# Patient Record
Sex: FEMALE | Race: WHITE | NOT HISPANIC OR LATINO | Employment: FULL TIME | URBAN - METROPOLITAN AREA
[De-identification: names, ages, dates, MRNs, and addresses within clinical notes are randomized per-mention and may not be internally consistent; named-entity substitution may affect disease eponyms.]

---

## 2017-01-10 ENCOUNTER — THERAPY VISIT (OUTPATIENT)
Dept: CHIROPRACTIC MEDICINE | Facility: CLINIC | Age: 57
End: 2017-01-10
Payer: COMMERCIAL

## 2017-01-10 DIAGNOSIS — M54.2 CERVICALGIA: ICD-10-CM

## 2017-01-10 DIAGNOSIS — M99.04 SOMATIC DYSFUNCTION OF SACRAL REGION: ICD-10-CM

## 2017-01-10 DIAGNOSIS — G89.29 CHRONIC BILATERAL LOW BACK PAIN WITHOUT SCIATICA: Primary | ICD-10-CM

## 2017-01-10 DIAGNOSIS — G44.201 INTRACTABLE TENSION-TYPE HEADACHE, UNSPECIFIED CHRONICITY PATTERN: ICD-10-CM

## 2017-01-10 DIAGNOSIS — M99.02 THORACIC SEGMENT DYSFUNCTION: ICD-10-CM

## 2017-01-10 DIAGNOSIS — M54.50 CHRONIC BILATERAL LOW BACK PAIN WITHOUT SCIATICA: Primary | ICD-10-CM

## 2017-01-10 DIAGNOSIS — M53.3 SACRAL PAIN: ICD-10-CM

## 2017-01-10 PROCEDURE — 98941 CHIROPRACT MANJ 3-4 REGIONS: CPT | Mod: AT | Performed by: CHIROPRACTOR

## 2017-01-10 PROCEDURE — 97810 ACUP 1/> WO ESTIM 1ST 15 MIN: CPT | Mod: GA | Performed by: CHIROPRACTOR

## 2017-01-10 NOTE — MR AVS SNAPSHOT
"              After Visit Summary   1/10/2017    Mariah Mart    MRN: [id number]           Patient Information     Date Of Birth          1960        Visit Information        Provider Department      1/10/2017 11:30 AM David Flores DC [facility]        Today's Diagnoses     Chronic bilateral low back pain without sciatica    -  1     Sacral pain         Somatic dysfunction of sacral region         Thoracic segment dysfunction         Cervicalgia         Intractable tension-type headache, unspecified chronicity pattern            Follow-ups after your visit        Your next 10 appointments already scheduled     Jan 17, 2017 10:30 AM   [facility] Chiropractor with PAKO Butler (SEVERO Lundberg  )    [address] [address] [address] [phone]              Who to contact     If you have questions or need follow up information about today's clinic visit or your schedule please contact [facility] directly at [phone].  Normal or non-critical lab and imaging results will be communicated to you by MyChart, letter or phone within 4 business days after the clinic has received the results. If you do not hear from us within 7 days, please contact the clinic through [email or web address] or phone. If you have a critical or abnormal lab result, we will notify you by phone as soon as possible.  Submit refill requests through [employer] or call your pharmacy and they will forward the refill request to us. Please allow 3 business days for your refill to be completed.          Additional Information About Your Visit        SOLOMO365hart Information     [employer] lets you send messages to your doctor, view your test results, renew your prescriptions, schedule appointments and more. To sign up, go to [email or web address] . Click on \"Log in\" on the left side of the screen, which will take you to the Welcome page. Then click on \"Sign up Now\" on the right " side of the page.     You will be asked to enter the access code listed below, as well as some personal information. Please follow the directions to create your username and password.     Your access code is: [email or web address]  Expires: 2017  3:35 PM     Your access code will  in 90 days. If you need help or a new code, please call your [place] clinic or [phone].        Care EveryWhere ID     This is your Care EveryWhere ID. This could be used by other organizations to access your [place] medical records  [id number]        Your Vitals Were     Last Period                   2007            Blood Pressure from Last 3 Encounters:   16 132/78   09/11/15 138/80   07/31/15 158/90    Weight from Last 3 Encounters:   16 77.197 kg (170 lb 3 oz)   09/11/15 74.027 kg (163 lb 3.2 oz)   07/31/15 72.258 kg (159 lb 4.8 oz)              We Performed the Following     ACUPUNCTURE, 1+ NEEDLES, W/O ELECTRICAL STIM; INIT 15 MIN PERSONAL CONTACT     CHIROPRAC MANIP,SPINAL,3-4 REGIONS        Primary Care Provider Office Phone # Fax #    Jennifer Dickerson [phone]       [facility] [address] [place] [address]        Thank you!     Thank you for choosing [facility]  for your care. Our goal is always to provide you with excellent care. Hearing back from our patients is one way we can continue to improve our services. Please take a few minutes to complete the written survey that you may receive in the mail after your visit with us. Thank you!             Your Updated Medication List - Protect others around you: Learn how to safely use, store and throw away your medicines at www.disposemymeds.org.          This list is accurate as of: 1/10/17  4:52 PM.  Always use your most recent med list.                   Brand Name Dispense Instructions for use    ALPRAZolam 0.25 MG tablet    XANAX    30 tablet    Take 1 tablet (0.25 mg) by mouth 3 times daily as needed for  anxiety       amLODIPine 5 MG tablet    NORVASC    90 tablet    Take 1 tablet (5 mg) by mouth daily       aspirin 81 MG tablet     100    1 tab po QD (Once per day)       calcium + D 600-200 MG-UNIT Tabs   Generic drug:  calcium carbonate-vitamin D      1 TABLET TWICE DAILY       DULoxetine 60 MG EC capsule    CYMBALTA    90 capsule    Take 1 capsule (60 mg) by mouth daily       GLUCOSAMINE COMPLEX PO      Take  by mouth.       lidocaine 5 % Patch    LIDODERM    90 patch    Up to 3 patches at once.  Take off after 12 hrs with 12 hr interval without patch before re-applying       lisinopril 20 MG tablet    PRINIVIL/ZESTRIL    90 tablet    Take 1 tablet (20 mg) by mouth daily       MULTIVITAMIN TABS   OR          naproxen 500 MG tablet    NAPROSYN    60 tablet    Take 1 tablet (500 mg) by mouth 2 times daily as needed for moderate pain       orphenadrine 100 MG 12 hr tablet    NORFLEX    180 tablet    Take 1 tablet (100 mg) by mouth 2 times daily       simvastatin 40 MG tablet    ZOCOR    90 tablet    Take 1 tablet (40 mg) by mouth At Bedtime

## 2017-01-10 NOTE — PROGRESS NOTES
Visit #:  1/2017  JUANA: 42%  Cristine: 6  Subscore: 2  NDI: 48%  Authorized to 03-    Subjective:  Mariah Mart is a 54 year old female who is seen in f/u up for chronic: neck, headaches, back pain        Chronic bilateral low back pain without sciatica  Sacral pain  Somatic dysfunction of sacral region  Thoracic segment dysfunction  Cervicalgia  Intractable tension-type headache, unspecified chronicity pattern.     Since last visit the pt reports 35% subjective improvement since initial presentation. She reports much less pain in the neck area and low back. She reports L sided low back pain today in addition to L sided neck pain. The pt denies significant HA in the past week. She feels she is slowly returning to baseline. The pt denies weakness or other unusual sx.     ADL's sitting/driving 4/10 painful.      Objective:  The following was observed:    P: pain elicited on palpation  A: static palpation demonstrates intersegmental asymmetry  R: motion palpation notes restricted motion  T: muscle spasm at level(s):C4, C6, T4, T6, L5 and SacrumLumbar erector spinae, T-spine paraspinal, Lev scapulae, Traps and Sub-occiputBilaterally    Segmental spinal dysfunction/restrictions found at C2, C4, C6, C7, T3, T5, L5 and Sacrum.      Assessment:    Diagnoses:      1. Chronic bilateral low back pain without sciatica    2. Sacral pain    3. Somatic dysfunction of sacral region    4. Thoracic segment dysfunction    5. Cervicalgia    6. Intractable tension-type headache, unspecified chronicity pattern        Patient's condition:  Patient responding well to therapy    Treatment effectiveness:  Pt stable. No increase in sx post treatment      Procedures:  CMT:  13722 Chiropractic manipulative treatment 3-4 regions performed with activator only  Cervical: Cervical manual adjustment in the upper cervicals C2, C3 prone  Thoracic:T5, T6, diversified/activator PA  Lumbar: L4, L5, activator  Pelvic: R sacral, PSIS drop table      Modalities:  24399: Acupuncture:  Points: GV20, TH17B,  GV 20, GB30B, GV 2, GV 3, ST36B NIGHAT POINTS UPPER CERVICAL MUSCULATURE, [place]I POINTS OF THE LUMBAR SPINE, GB30B, 20 MINUTES USING CLEAN NEEDLE TECHNIQUE    Therapeutic procedures:  None      Prognosis: Good    Progress towards Goals:Sleep for 6-8 hours without waking up  Reduce HA from 3/per week to 1/per week  Walk 2 miles without issue  Duration to achieve goal will be 6-8 weeks at a frequency of 1 per week      Recommendations:    Instructions:Mindful of good sitting postures    Follow-up:  Return to care 1 week with ACP

## 2017-01-17 ENCOUNTER — THERAPY VISIT (OUTPATIENT)
Dept: CHIROPRACTIC MEDICINE | Facility: CLINIC | Age: 57
End: 2017-01-17
Payer: COMMERCIAL

## 2017-01-17 DIAGNOSIS — M54.2 CERVICALGIA: ICD-10-CM

## 2017-01-17 DIAGNOSIS — M54.50 CHRONIC BILATERAL LOW BACK PAIN WITHOUT SCIATICA: Primary | ICD-10-CM

## 2017-01-17 DIAGNOSIS — M99.04 SOMATIC DYSFUNCTION OF SACRAL REGION: ICD-10-CM

## 2017-01-17 DIAGNOSIS — M99.02 THORACIC SEGMENT DYSFUNCTION: ICD-10-CM

## 2017-01-17 DIAGNOSIS — G89.29 CHRONIC BILATERAL LOW BACK PAIN WITHOUT SCIATICA: Primary | ICD-10-CM

## 2017-01-17 DIAGNOSIS — M53.3 SACRAL PAIN: ICD-10-CM

## 2017-01-17 DIAGNOSIS — G44.201 INTRACTABLE TENSION-TYPE HEADACHE, UNSPECIFIED CHRONICITY PATTERN: ICD-10-CM

## 2017-01-17 PROCEDURE — 98941 CHIROPRACT MANJ 3-4 REGIONS: CPT | Mod: AT | Performed by: CHIROPRACTOR

## 2017-01-17 PROCEDURE — 97810 ACUP 1/> WO ESTIM 1ST 15 MIN: CPT | Mod: GA | Performed by: CHIROPRACTOR

## 2017-01-17 NOTE — MR AVS SNAPSHOT
"              After Visit Summary   1/17/2017    Mariah Mart    MRN: [id number]           Patient Information     Date Of Birth          1960        Visit Information        Provider Department      1/17/2017 10:30 AM David Flores DC [facility]        Today's Diagnoses     Chronic bilateral low back pain without sciatica    -  1     Sacral pain         Somatic dysfunction of sacral region         Thoracic segment dysfunction         Cervicalgia         Intractable tension-type headache, unspecified chronicity pattern            Follow-ups after your visit        Your next 10 appointments already scheduled     Jan 26, 2017  2:30 PM   [facility] Chiropractor with PAKO Butler ([facility] Lundberg  )    [address] [address] [address] [phone]              Who to contact     If you have questions or need follow up information about today's clinic visit or your schedule please contact [facility] directly at [phone].  Normal or non-critical lab and imaging results will be communicated to you by [email or web address], letter or phone within 4 business days after the clinic has received the results. If you do not hear from us within 7 days, please contact the clinic through [email or web address] or phone. If you have a critical or abnormal lab result, we will notify you by phone as soon as possible.  Submit refill requests through [employer] or call your pharmacy and they will forward the refill request to us. Please allow 3 business days for your refill to be completed.          Additional Information About Your Visit        [email or web address] Information     [employer] lets you send messages to your doctor, view your test results, renew your prescriptions, schedule appointments and more. To sign up, go to [email or web address] . Click on \"Log in\" on the left side of the screen, which will take you to the Welcome page. Then click on \"Sign up Now\" on the right " side of the page.     You will be asked to enter the access code listed below, as well as some personal information. Please follow the directions to create your username and password.     Your access code is: [email or web address]  Expires: 2017  3:35 PM     Your access code will  in 90 days. If you need help or a new code, please call your [place] clinic or [phone].        Care EveryWhere ID     This is your Care EveryWhere ID. This could be used by other organizations to access your [place] medical records  [id number]        Your Vitals Were     Last Period                   2007            Blood Pressure from Last 3 Encounters:   16 132/78   09/11/15 138/80   07/31/15 158/90    Weight from Last 3 Encounters:   16 77.197 kg (170 lb 3 oz)   09/11/15 74.027 kg (163 lb 3.2 oz)   07/31/15 72.258 kg (159 lb 4.8 oz)              We Performed the Following     ACUPUNCTURE, 1+ NEEDLES, W/O ELECTRICAL STIM; INIT 15 MIN PERSONAL CONTACT     CHIROPRAC MANIP,SPINAL,3-4 REGIONS        Primary Care Provider Office Phone # Fax #    Jennifer Dickerson [phone]       [facility] [address] [place] [address]        Thank you!     Thank you for choosing [facility]  for your care. Our goal is always to provide you with excellent care. Hearing back from our patients is one way we can continue to improve our services. Please take a few minutes to complete the written survey that you may receive in the mail after your visit with us. Thank you!             Your Updated Medication List - Protect others around you: Learn how to safely use, store and throw away your medicines at www.disposemymeds.org.          This list is accurate as of: 17 11:59 PM.  Always use your most recent med list.                   Brand Name Dispense Instructions for use    ALPRAZolam 0.25 MG tablet    XANAX    30 tablet    Take 1 tablet (0.25 mg) by mouth 3 times daily as needed for  anxiety       amLODIPine 5 MG tablet    NORVASC    90 tablet    Take 1 tablet (5 mg) by mouth daily       aspirin 81 MG tablet     100    1 tab po QD (Once per day)       calcium + D 600-200 MG-UNIT Tabs   Generic drug:  calcium carbonate-vitamin D      1 TABLET TWICE DAILY       DULoxetine 60 MG EC capsule    CYMBALTA    90 capsule    Take 1 capsule (60 mg) by mouth daily       GLUCOSAMINE COMPLEX PO      Take  by mouth.       lidocaine 5 % Patch    LIDODERM    90 patch    Up to 3 patches at once.  Take off after 12 hrs with 12 hr interval without patch before re-applying       lisinopril 20 MG tablet    PRINIVIL/ZESTRIL    90 tablet    Take 1 tablet (20 mg) by mouth daily       MULTIVITAMIN TABS   OR          naproxen 500 MG tablet    NAPROSYN    60 tablet    Take 1 tablet (500 mg) by mouth 2 times daily as needed for moderate pain       orphenadrine 100 MG 12 hr tablet    NORFLEX    180 tablet    Take 1 tablet (100 mg) by mouth 2 times daily       simvastatin 40 MG tablet    ZOCOR    90 tablet    Take 1 tablet (40 mg) by mouth At Bedtime

## 2017-01-18 NOTE — PROGRESS NOTES
Visit #:  2/2017  JUANA: 42%  Cristine: 6  Subscore: 2  NDI: 48%  Authorized to 03-    Subjective:  Mariah Mart is a 54 year old female who is seen in f/u up for chronic: neck, headaches, back pain        Chronic bilateral low back pain without sciatica  Sacral pain  Somatic dysfunction of sacral region  Thoracic segment dysfunction  Cervicalgia  Intractable tension-type headache, unspecified chronicity pattern.     Since last visit the pt reports 40% subjective improvement since initial presentation. Pain levels are approximately  3-5/10 on VAS. She reports ability to sleep for longer periods without interruption. The pt is walking 2 miles without issue. She reports HA however they are lower grade , not as frequent and resolves with medication. Today she reports pain on the L side of the low back area. The pt denies weakness or other unusual sx.     ADL's sitting/driving 3-4/10 painful.      Objective:  The following was observed:    P: pain elicited on palpation  A: static palpation demonstrates intersegmental asymmetry  R: motion palpation notes restricted motion  T: muscle spasm at level(s):C4, C6, T4, T6, L5 and SacrumLumbar erector spinae, T-spine paraspinal, Lev scapulae, Traps and Sub-occiput Bilaterally    Segmental spinal dysfunction/restrictions found at C2, C4, C6, C7, T3, T5, L5 and Sacrum.      Assessment:    Diagnoses:      1. Chronic bilateral low back pain without sciatica    2. Sacral pain    3. Somatic dysfunction of sacral region    4. Thoracic segment dysfunction    5. Cervicalgia    6. Intractable tension-type headache, unspecified chronicity pattern        Patient's condition:  Patient responding well to therapy    Treatment effectiveness:  Pt stable. No increase in sx post treatment      Procedures:  CMT:  91844 Chiropractic manipulative treatment 3-4 regions performed with activator only  Cervical: Cervical manual adjustment in the upper cervicals C2, C3 prone  Thoracic:T5, T6,  diversified/activator PA  Lumbar: L4, L5, activator  Pelvic: R sacral, PSIS drop table     Modalities:  09553: Acupuncture:  Points: GV20, TH17B,  GV 20, GB30B, GV 2, GV 3, ST36B NIGHAT POINTS UPPER CERVICAL MUSCULATURE, HU[place]I POINTS OF THE LUMBAR SPINE, GB30B, 20 MINUTES USING CLEAN NEEDLE TECHNIQUE    Therapeutic procedures:  None      Prognosis: Good    Progress towards Goals:Sleep for 6-8 hours without waking up  Reduce HA from 3/per week to 1/per week-the pt has met this goal  Walk 2 miles without issue-the pt has met this goal    Duration to achieve goal will be 6-8 weeks at a frequency of 1 per week      Recommendations:    Instructions:Mindful of good sitting postures    Follow-up:  Return to care 1 week with ACP

## 2017-01-26 ENCOUNTER — THERAPY VISIT (OUTPATIENT)
Dept: CHIROPRACTIC MEDICINE | Facility: CLINIC | Age: 57
End: 2017-01-26
Payer: COMMERCIAL

## 2017-01-26 DIAGNOSIS — G44.201 INTRACTABLE TENSION-TYPE HEADACHE, UNSPECIFIED CHRONICITY PATTERN: ICD-10-CM

## 2017-01-26 DIAGNOSIS — M99.04 SOMATIC DYSFUNCTION OF SACRAL REGION: ICD-10-CM

## 2017-01-26 DIAGNOSIS — G89.29 CHRONIC BILATERAL LOW BACK PAIN WITHOUT SCIATICA: Primary | ICD-10-CM

## 2017-01-26 DIAGNOSIS — M99.02 THORACIC SEGMENT DYSFUNCTION: ICD-10-CM

## 2017-01-26 DIAGNOSIS — M53.3 SACRAL PAIN: ICD-10-CM

## 2017-01-26 DIAGNOSIS — M54.2 CERVICALGIA: ICD-10-CM

## 2017-01-26 DIAGNOSIS — M54.50 CHRONIC BILATERAL LOW BACK PAIN WITHOUT SCIATICA: Primary | ICD-10-CM

## 2017-01-26 PROCEDURE — 98941 CHIROPRACT MANJ 3-4 REGIONS: CPT | Mod: AT | Performed by: CHIROPRACTOR

## 2017-01-26 PROCEDURE — 97810 ACUP 1/> WO ESTIM 1ST 15 MIN: CPT | Mod: GA | Performed by: CHIROPRACTOR

## 2017-01-26 NOTE — MR AVS SNAPSHOT
"              After Visit Summary   1/26/2017    Mariah Mart    MRN: [id number]           Patient Information     Date Of Birth          1960        Visit Information        Provider Department      1/26/2017 2:30 PM David Flores DC [facility]        Today's Diagnoses     Chronic bilateral low back pain without sciatica    -  1     Sacral pain         Somatic dysfunction of sacral region         Thoracic segment dysfunction         Cervicalgia         Intractable tension-type headache, unspecified chronicity pattern            Follow-ups after your visit        Your next 10 appointments already scheduled     Feb 09, 2017  2:00 PM   [facility] Chiropractor with PAKO Butler (SE[facility]O Lundberg  )    [address] [address] [address] [phone]              Who to contact     If you have questions or need follow up information about today's clinic visit or your schedule please contact [facility] directly at [phone].  Normal or non-critical lab and imaging results will be communicated to you by Envervhart, letter or phone within 4 business days after the clinic has received the results. If you do not hear from us within 7 days, please contact the clinic through Enver[employer] or phone. If you have a critical or abnormal lab result, we will notify you by phone as soon as possible.  Submit refill requests through [employer] or call your pharmacy and they will forward the refill request to us. Please allow 3 business days for your refill to be completed.          Additional Information About Your Visit        En[employer] Information     [employer] lets you send messages to your doctor, view your test results, renew your prescriptions, schedule appointments and more. To sign up, go to [email or web address] . Click on \"Log in\" on the left side of the screen, which will take you to the Welcome page. Then click on \"Sign up Now\" on the right " side of the page.     You will be asked to enter the access code listed below, as well as some personal information. Please follow the directions to create your username and password.     Your access code is: [email or web address]  Expires: 2017  3:35 PM     Your access code will  in 90 days. If you need help or a new code, please call your [place] clinic or [phone].        Care EveryWhere ID     This is your Care EveryWhere ID. This could be used by other organizations to access your [place] medical records  [id number]        Your Vitals Were     Last Period                   2007            Blood Pressure from Last 3 Encounters:   16 132/78   09/11/15 138/80   07/31/15 158/90    Weight from Last 3 Encounters:   16 77.197 kg (170 lb 3 oz)   09/11/15 74.027 kg (163 lb 3.2 oz)   07/31/15 72.258 kg (159 lb 4.8 oz)              We Performed the Following     ACUPUNCTURE, 1+ NEEDLES, W/O ELECTRICAL STIM; INIT 15 MIN PERSONAL CONTACT     CHIROPRAC MANIP,SPINAL,3-4 REGIONS        Primary Care Provider Office Phone # Fax #    Jennifer Dickerson [phone]       [address] [place] [address]        Thank you!     Thank you for choosing [facility]  for your care. Our goal is always to provide you with excellent care. Hearing back from our patients is one way we can continue to improve our services. Please take a few minutes to complete the written survey that you may receive in the mail after your visit with us. Thank you!             Your Updated Medication List - Protect others around you: Learn how to safely use, store and throw away your medicines at www.disposemymeds.org.          This list is accurate as of: 17 11:59 PM.  Always use your most recent med list.                   Brand Name Dispense Instructions for use    ALPRAZolam 0.25 MG tablet    XANAX    30 tablet    Take 1 tablet (0.25 mg) by mouth 3 times daily as  needed for anxiety       amLODIPine 5 MG tablet    NORVASC    90 tablet    Take 1 tablet (5 mg) by mouth daily       aspirin 81 MG tablet     100    1 tab po QD (Once per day)       calcium + D 600-200 MG-UNIT Tabs   Generic drug:  calcium carbonate-vitamin D      1 TABLET TWICE DAILY       DULoxetine 60 MG EC capsule    CYMBALTA    90 capsule    Take 1 capsule (60 mg) by mouth daily       GLUCOSAMINE COMPLEX PO      Take  by mouth.       lidocaine 5 % Patch    LIDODERM    90 patch    Up to 3 patches at once.  Take off after 12 hrs with 12 hr interval without patch before re-applying       lisinopril 20 MG tablet    PRINIVIL/ZESTRIL    90 tablet    Take 1 tablet (20 mg) by mouth daily       MULTIVITAMIN TABS   OR          naproxen 500 MG tablet    NAPROSYN    60 tablet    Take 1 tablet (500 mg) by mouth 2 times daily as needed for moderate pain       orphenadrine 100 MG 12 hr tablet    NORFLEX    180 tablet    Take 1 tablet (100 mg) by mouth 2 times daily       simvastatin 40 MG tablet    ZOCOR    90 tablet    Take 1 tablet (40 mg) by mouth At Bedtime

## 2017-01-27 NOTE — PROGRESS NOTES
Visit #:  3/2017  JUANA: 42%  Cristine: 6  Subscore: 2  NDI: 48%  Authorized to 03-    Subjective:  Mariah Mart is a 54 year old female who is seen in f/u up for chronic: neck, headaches, back pain        Chronic bilateral low back pain without sciatica  Sacral pain  Somatic dysfunction of sacral region  Thoracic segment dysfunction  Cervicalgia  Intractable tension-type headache, unspecified chronicity pattern.     Since last visit the pt reports 55% subjective improvement since initial therapy. She states HA are 50% less overall. She is able to sleep with no interruption due to pain. The pt is able to walk almost 2 miles without issue. The pt is pleased with her improvement.   ADL's sitting/driving 3-4/10 painful.      Objective:  The following was observed:    P: pain elicited on palpation  A: static palpation demonstrates intersegmental asymmetry  R: motion palpation notes restricted motion  T: muscle spasm at level(s):C4, C6, T4, T6, L5 and SacrumLumbar erector spinae, T-spine paraspinal, Lev scapulae, Traps and Sub-occiput Bilaterally    Segmental spinal dysfunction/restrictions found at C2, C4, C6, C7, T3, T5, L5 and Sacrum.      Assessment:    Diagnoses:      1. Chronic bilateral low back pain without sciatica    2. Sacral pain    3. Somatic dysfunction of sacral region    4. Thoracic segment dysfunction    5. Cervicalgia    6. Intractable tension-type headache, unspecified chronicity pattern        Patient's condition:  Patient responding well to therapy    Treatment effectiveness:  Pt stable. No increase in sx post treatment      Procedures:  CMT:  41262 Chiropractic manipulative treatment 3-4 regions performed with activator only  Cervical: Cervical manual adjustment in the upper cervicals C2, C3 prone  Thoracic:T5, T6, diversified/activator PA  Lumbar: L4, L5, activator  Pelvic: R sacral, PSIS drop table     Modalities:  73994: Acupuncture:  Points: GV20, TH17B,  GV 20, GB30B, GV 2, GV 3, ST36B  NIGHAT POINTS UPPER CERVICAL MUSCULATURE, HU[place]I POINTS OF THE LUMBAR SPINE, GB30B, 20 MINUTES USING CLEAN NEEDLE TECHNIQUE    Therapeutic procedures:  None      Prognosis: Good    Progress towards Goals:Sleep for 6-8 hours without waking up  Reduce HA from 3/per week to 1/per week-the pt has met this goal  Walk 2 miles without issue-the pt has met this goal    Duration to achieve goal will be 6-8 weeks at a frequency of 1 per week      Recommendations:    Instructions:Mindful of good sitting postures    Follow-up:  Return to care 1 week with ACP

## 2017-02-09 ENCOUNTER — THERAPY VISIT (OUTPATIENT)
Dept: CHIROPRACTIC MEDICINE | Facility: CLINIC | Age: 57
End: 2017-02-09
Payer: COMMERCIAL

## 2017-02-09 DIAGNOSIS — M99.02 THORACIC SEGMENT DYSFUNCTION: ICD-10-CM

## 2017-02-09 DIAGNOSIS — M99.04 SOMATIC DYSFUNCTION OF SACRAL REGION: ICD-10-CM

## 2017-02-09 DIAGNOSIS — M53.3 SACRAL PAIN: ICD-10-CM

## 2017-02-09 DIAGNOSIS — G44.201 INTRACTABLE TENSION-TYPE HEADACHE, UNSPECIFIED CHRONICITY PATTERN: ICD-10-CM

## 2017-02-09 DIAGNOSIS — M54.50 CHRONIC BILATERAL LOW BACK PAIN WITHOUT SCIATICA: Primary | ICD-10-CM

## 2017-02-09 DIAGNOSIS — M54.2 CERVICALGIA: ICD-10-CM

## 2017-02-09 DIAGNOSIS — G89.29 CHRONIC BILATERAL LOW BACK PAIN WITHOUT SCIATICA: Primary | ICD-10-CM

## 2017-02-09 PROCEDURE — 97110 THERAPEUTIC EXERCISES: CPT | Performed by: CHIROPRACTOR

## 2017-02-09 PROCEDURE — 97810 ACUP 1/> WO ESTIM 1ST 15 MIN: CPT | Mod: GA | Performed by: CHIROPRACTOR

## 2017-02-09 PROCEDURE — 98941 CHIROPRACT MANJ 3-4 REGIONS: CPT | Mod: AT | Performed by: CHIROPRACTOR

## 2017-02-09 NOTE — MR AVS SNAPSHOT
"              After Visit Summary   2/9/2017    Mariah Mart    MRN: [id number]           Patient Information     Date Of Birth          1960        Visit Information        Provider Department      2/9/2017 2:00 PM David Flores DC [facility]        Today's Diagnoses     Chronic bilateral low back pain without sciatica    -  1     Sacral pain         Somatic dysfunction of sacral region         Thoracic segment dysfunction         Cervicalgia         Intractable tension-type headache, unspecified chronicity pattern            Follow-ups after your visit        Your next 10 appointments already scheduled     Feb 23, 2017  1:30 PM   [facility] with [facility] ([facility]  )    [address] [address] [address] [phone]              Who to contact     If you have questions or need follow up information about today's clinic visit or your schedule please contact [facility] directly at [phone].  Normal or non-critical lab and imaging results will be communicated to you by [employer], letter or phone within 4 business days after the clinic has received the results. If you do not hear from us within 7 days, please contact the clinic through [employer] or phone. If you have a critical or abnormal lab result, we will notify you by phone as soon as possible.  Submit refill requests through [employer] or call your pharmacy and they will forward the refill request to us. Please allow 3 business days for your refill to be completed.          Additional Information About Your Visit        [employer] Information     [employer] lets you send messages to your doctor, view your test results, renew your prescriptions, schedule appointments and more. To sign up, go to [email or web address] . Click on \"Log in\" on the left side of the screen, which will take you to the Welcome page. Then click on \"Sign up Now\" on the right side " of the page.     You will be asked to enter the access code listed below, as well as some personal information. Please follow the directions to create your username and password.     Your access code is: [email or web address]  Expires: 2017  3:35 PM     Your access code will  in 90 days. If you need help or a new code, please call your [facility] clinic or [phone].        Care EveryWhere ID     This is your Care EveryWhere ID. This could be used by other organizations to access your [facility] medical records  [id number]        Your Vitals Were     Last Period                   2007            Blood Pressure from Last 3 Encounters:   16 132/78   09/11/15 138/80   07/31/15 158/90    Weight from Last 3 Encounters:   16 77.197 kg (170 lb 3 oz)   09/11/15 74.027 kg (163 lb 3.2 oz)   07/31/15 72.258 kg (159 lb 4.8 oz)              We Performed the Following     ACUPUNCTURE, 1+ NEEDLES, W/O ELECTRICAL STIM; INIT 15 MIN PERSONAL CONTACT     CHIROPRAC MANIP,SPINAL,3-4 REGIONS     THERAPEUTIC EXERCISES        Primary Care Provider Office Phone # Fax #    Jennifer Dickerson [phone]       [address] [place] [address]        Thank you!     Thank you for choosing [facility]  for your care. Our goal is always to provide you with excellent care. Hearing back from our patients is one way we can continue to improve our services. Please take a few minutes to complete the written survey that you may receive in the mail after your visit with us. Thank you!             Your Updated Medication List - Protect others around you: Learn how to safely use, store and throw away your medicines at www.disposemymeds.org.          This list is accurate as of: 17  3:06 PM.  Always use your most recent med list.                   Brand Name Dispense Instructions for use    ALPRAZolam 0.25 MG tablet    XANAX    30 tablet    Take 1 tablet (0.25 mg) by mouth 3  times daily as needed for anxiety       amLODIPine 5 MG tablet    NORVASC    90 tablet    Take 1 tablet (5 mg) by mouth daily       aspirin 81 MG tablet     100    1 tab po QD (Once per day)       calcium + D 600-200 MG-UNIT Tabs   Generic drug:  calcium carbonate-vitamin D      1 TABLET TWICE DAILY       DULoxetine 60 MG EC capsule    CYMBALTA    90 capsule    Take 1 capsule (60 mg) by mouth daily       GLUCOSAMINE COMPLEX PO      Take  by mouth.       lidocaine 5 % Patch    LIDODERM    90 patch    Up to 3 patches at once.  Take off after 12 hrs with 12 hr interval without patch before re-applying       lisinopril 20 MG tablet    PRINIVIL/ZESTRIL    90 tablet    Take 1 tablet (20 mg) by mouth daily       MULTIVITAMIN TABS   OR          naproxen 500 MG tablet    NAPROSYN    60 tablet    Take 1 tablet (500 mg) by mouth 2 times daily as needed for moderate pain       orphenadrine 100 MG 12 hr tablet    NORFLEX    180 tablet    Take 1 tablet (100 mg) by mouth 2 times daily       simvastatin 40 MG tablet    ZOCOR    90 tablet    Take 1 tablet (40 mg) by mouth At Bedtime

## 2017-02-09 NOTE — PROGRESS NOTES
Visit #:  4/2017  JUANA: 32%  Cristine: 6  Subscore: 2  NDI: 32  Authorized to 03-    Subjective:  Mariah Mart is a 54 year old female who is seen in f/u up for chronic: neck, headaches, back pain        Chronic bilateral low back pain without sciatica  Sacral pain  Somatic dysfunction of sacral region  Thoracic segment dysfunction  Cervicalgia  Intractable tension-type headache, unspecified chronicity pattern.     Since last visit the pt reports 65% subjective improvement since initial presentation. She continues to experience HA 6 times per week however she relates the pain to moderate stress. The pt reports significantly reduced neck pain however she notes moderate L sided sacral and low back pain. The pt denies weakness or other unusual sx.       Objective:  The following was observed:    P: pain elicited on palpation  A: static palpation demonstrates intersegmental asymmetry  R: motion palpation notes restricted motion  T: muscle spasm at level(s):C4, C6, T4, T6, L5 and SacrumLumbar erector spinae, T-spine paraspinal, Lev scapulae, Traps and Sub-occiput Bilaterally    Segmental spinal dysfunction/restrictions found at C2, C4, C6, C7, T3, T5, L5 and Sacrum.      Assessment:    Diagnoses:      1. Chronic bilateral low back pain without sciatica    2. Sacral pain    3. Somatic dysfunction of sacral region    4. Thoracic segment dysfunction    5. Cervicalgia    6. Intractable tension-type headache, unspecified chronicity pattern        Patient's condition:  Patient responding well to therapy    Treatment effectiveness:  Pt stable. No increase in sx post treatment      Procedures:  CMT:  49733 Chiropractic manipulative treatment 3-4 regions performed with activator only  Cervical: Cervical manual adjustment in the upper cervicals C2, C3 prone  Thoracic:T5, T6, diversified/activator PA  Lumbar: L4, L5, activator  Pelvic: R sacral, PSIS drop table     Modalities:  00334: Acupuncture:  Points: GV20, TH17B,  GV  20, GB30B, GV 2, GV 3, ST36B NIGHAT POINTS UPPER CERVICAL MUSCULATURE, [place] POINTS OF THE LUMBAR SPINE, GB30B, 20 MINUTES USING CLEAN NEEDLE TECHNIQUE    Therapeutic procedures:  Gave press-ups with demonstration  Gave supine pretzel stretch with demonstration  Per 8 minutes       Prognosis: Good    Progress towards Goals:Sleep for 6-8 hours without waking up  Reduce HA from 3/per week to 1/per week-the pt has met this goal  Walk 2 miles without issue-the pt has met this goal    Duration to achieve goal will be 6-8 weeks at a frequency of 1 per week      Recommendations:    Instructions:Mindful of good sitting postures    Follow-up:  Return to care 1 week with ACP

## 2017-02-23 ENCOUNTER — THERAPY VISIT (OUTPATIENT)
Dept: CHIROPRACTIC MEDICINE | Facility: CLINIC | Age: 57
End: 2017-02-23
Payer: COMMERCIAL

## 2017-02-23 DIAGNOSIS — M54.50 CHRONIC BILATERAL LOW BACK PAIN WITHOUT SCIATICA: ICD-10-CM

## 2017-02-23 DIAGNOSIS — M99.02 THORACIC SEGMENT DYSFUNCTION: ICD-10-CM

## 2017-02-23 DIAGNOSIS — G44.201 INTRACTABLE TENSION-TYPE HEADACHE, UNSPECIFIED CHRONICITY PATTERN: ICD-10-CM

## 2017-02-23 DIAGNOSIS — M54.2 CERVICALGIA: Primary | ICD-10-CM

## 2017-02-23 DIAGNOSIS — M99.04 SOMATIC DYSFUNCTION OF SACRAL REGION: ICD-10-CM

## 2017-02-23 DIAGNOSIS — G89.29 CHRONIC BILATERAL LOW BACK PAIN WITHOUT SCIATICA: ICD-10-CM

## 2017-02-23 DIAGNOSIS — M53.3 SACRAL PAIN: ICD-10-CM

## 2017-02-23 PROCEDURE — 97810 ACUP 1/> WO ESTIM 1ST 15 MIN: CPT | Mod: GA | Performed by: CHIROPRACTOR

## 2017-02-23 PROCEDURE — 98941 CHIROPRACT MANJ 3-4 REGIONS: CPT | Mod: AT | Performed by: CHIROPRACTOR

## 2017-02-23 NOTE — PROGRESS NOTES
Visit #:  5/2017  JUANA: 32%  Cristine: 6  Subscore: 2  NDI: 32  Authorized to 03-    Subjective:  Mariah Mart is a 54 year old female who is seen in f/u up for chronic: neck, headaches, back pain        Chronic bilateral low back pain without sciatica  Sacral pain  Somatic dysfunction of sacral region  Thoracic segment dysfunction  Cervicalgia  Intractable tension-type headache, unspecified chronicity pattern.     Since last visit the pt reports 65% subjective improvement since initial presentation. She continues to experience HA 6 times per week however she relates the pain to sickness this week.  She states her family has flu sx and  the noravirus and she is under stress at home. Today she notes an increase in neck pain and HA today, She states the low back is quite stable. She has not performed prescribed exercises due to illness.     Objective:  The following was observed:    P: pain elicited on palpation  A: static palpation demonstrates intersegmental asymmetry  R: motion palpation notes restricted motion  T: muscle spasm at level(s):C4, C6, T4, T6, L5 and SacrumLumbar erector spinae, T-spine paraspinal, Lev scapulae, Traps and Sub-occiput Bilaterally    Segmental spinal dysfunction/restrictions found at C2, C4, C6, C7, T3, T5, L5 and Sacrum.      Assessment:    Diagnoses:      1. Chronic bilateral low back pain without sciatica    2. Sacral pain    3. Somatic dysfunction of sacral region    4. Thoracic segment dysfunction    5. Cervicalgia    6. Intractable tension-type headache, unspecified chronicity pattern        Patient's condition:  No change     Treatment effectiveness:  Pt stable. No increase in sx post treatment      Procedures:  CMT:  39978 Chiropractic manipulative treatment 3-4 regions performed with activator only  Cervical: Cervical manual adjustment in the upper cervicals C2, C3 prone  Thoracic:T5, T6, diversified/activator PA  Lumbar: L4, L5, activator  Pelvic: R sacral, PSIS drop  table     Modalities:  12704: Acupuncture:  Points: GV20, TH17B,  GV 20, GB30B, GV 2, GV 3, ST36B NIGHAT POINTS UPPER CERVICAL MUSCULATURE, [place] POINTS OF THE LUMBAR SPINE, GB30B, 20 MINUTES USING CLEAN NEEDLE TECHNIQUE    Therapeutic procedures:  None     Prognosis: Good    Progress towards Goals:Sleep for 6-8 hours without waking up  Reduce HA from 3/per week to 1/per week-the pt has met this goal  Walk 2 miles without issue-the pt has met this goal    Duration to achieve goal will be 6-8 weeks at a frequency of 1 per week      Recommendations:    Instructions:Mindful of good sitting postures    Follow-up:  Return to care 1 week with ACP

## 2017-03-06 DIAGNOSIS — M54.50 BILATERAL LOW BACK PAIN WITHOUT SCIATICA, UNSPECIFIED CHRONICITY: ICD-10-CM

## 2017-03-06 DIAGNOSIS — M54.2 CERVICALGIA: ICD-10-CM

## 2017-03-06 RX ORDER — ORPHENADRINE CITRATE 100 MG/1
100 TABLET, EXTENDED RELEASE ORAL 2 TIMES DAILY
Qty: 180 TABLET | Refills: 0 | Status: SHIPPED | OUTPATIENT
Start: 2017-03-06 | End: 2017-04-25

## 2017-03-06 NOTE — TELEPHONE ENCOUNTER
ORPHENADRINE 100MG      Last Written Prescription Date:  6/16/2016  Last Fill Quantity: 180,   # refills: 0  Last Office Visit with [facility], [facility] or  Health prescribing provider: 11/29/2016  Future Office visit:       Routing refill request to provider for review/approval because:  Drug not on the [facility] or  Health refill protocol or controlled substance

## 2017-03-06 NOTE — TELEPHONE ENCOUNTER
Routing refill request to provider for review/approval because:  Drug not on the [facility] refill protocol please sign if ok.  Vilma Davies, [place]  Triage Nurse

## 2017-03-09 ENCOUNTER — THERAPY VISIT (OUTPATIENT)
Dept: CHIROPRACTIC MEDICINE | Facility: CLINIC | Age: 57
End: 2017-03-09
Payer: COMMERCIAL

## 2017-03-09 DIAGNOSIS — G89.29 CHRONIC BILATERAL LOW BACK PAIN WITHOUT SCIATICA: Primary | ICD-10-CM

## 2017-03-09 DIAGNOSIS — M99.04 SOMATIC DYSFUNCTION OF SACRAL REGION: ICD-10-CM

## 2017-03-09 DIAGNOSIS — R51.9 CEPHALGIA: ICD-10-CM

## 2017-03-09 DIAGNOSIS — M53.3 SACRAL PAIN: ICD-10-CM

## 2017-03-09 DIAGNOSIS — M54.2 CERVICALGIA: ICD-10-CM

## 2017-03-09 DIAGNOSIS — M99.02 THORACIC SEGMENT DYSFUNCTION: ICD-10-CM

## 2017-03-09 DIAGNOSIS — M54.50 CHRONIC BILATERAL LOW BACK PAIN WITHOUT SCIATICA: Primary | ICD-10-CM

## 2017-03-09 PROCEDURE — 97810 ACUP 1/> WO ESTIM 1ST 15 MIN: CPT | Mod: GA | Performed by: CHIROPRACTOR

## 2017-03-09 PROCEDURE — 98941 CHIROPRACT MANJ 3-4 REGIONS: CPT | Mod: AT | Performed by: CHIROPRACTOR

## 2017-03-11 PROBLEM — R51.9 CEPHALGIA: Status: ACTIVE | Noted: 2017-03-11

## 2017-03-12 NOTE — PROGRESS NOTES
Visit #:  6/2017  JUANA: 32%  Cristine: 6  Subscore: 2  NDI: 32  Authorized to 03-    Subjective:  Mariah Mart is a 54 year old female who is seen in f/u up for chronic: neck, headaches, back pain        Chronic bilateral low back pain without sciatica  Sacral pain  Somatic dysfunction of sacral region  Thoracic segment dysfunction  Cervicalgia  Cephalgia.     Since last visit the pt reports 65%-70% subjective improvement since initial presentation. She denies sickness this week and states she continues to improve. Today she reports pain in the L hip area that she relates to standing for long periods. She notes HA now 2 times per week rather than everyday. She is pleased with her improvement.       Objective:  The following was observed:    P: pain elicited on palpation  A: static palpation demonstrates intersegmental asymmetry  R: motion palpation notes restricted motion  T: muscle spasm at level(s):C4, C6, T4, T6, L5 and SacrumLumbar erector spinae, T-spine paraspinal, Lev scapulae, Traps and Sub-occiput Bilaterally    Segmental spinal dysfunction/restrictions found at C2, C4, C6, C7, T3, T5, L5 and Sacrum.      Assessment:    Diagnoses:      1. Chronic bilateral low back pain without sciatica    2. Sacral pain    3. Somatic dysfunction of sacral region    4. Thoracic segment dysfunction    5. Cervicalgia    6. Cephalgia        Patient's condition:  Pt responding well to therapy    Treatment effectiveness:  Pt stable. No increase in sx post treatment      Procedures:  CMT:  68486 Chiropractic manipulative treatment 3-4 regions performed with activator only  Cervical: Cervical manual adjustment in the upper cervicals C2, C3 prone  Thoracic:T5, T6, diversified/activator PA  Lumbar: L4, L5, activator  Pelvic: R sacral, PSIS drop table     Modalities:  79755: Acupuncture:  Points: GV20, TH17B,  GV 20, GB30B, GV 2, GV 3, ST36B NIGHAT POINTS UPPER CERVICAL MUSCULATURE, [place] POINTS OF THE LUMBAR SPINE,  GB30B, 20 MINUTES USING CLEAN NEEDLE TECHNIQUE    Therapeutic procedures:  None     Prognosis: Good    Progress towards Goals:Sleep for 6-8 hours without waking up  Reduce HA from 3/per week to 1/per week-the pt has met this goal  Walk 2 miles without issue-the pt has met this goal    Duration to achieve goal will be 6-8 weeks at a frequency of 1 per week      Recommendations:    Instructions:Mindful of good sitting postures    Follow-up:  Return to care 1-2 week with ACP

## 2017-03-14 DIAGNOSIS — F41.0 PANIC DISORDER WITHOUT AGORAPHOBIA: ICD-10-CM

## 2017-03-14 RX ORDER — ALPRAZOLAM 0.25 MG
0.25 TABLET ORAL 3 TIMES DAILY PRN
Qty: 30 TABLET | Refills: 0 | Status: SHIPPED | OUTPATIENT
Start: 2017-03-14 | End: 2018-01-24

## 2017-03-14 NOTE — TELEPHONE ENCOUNTER
ALPRAZOLAM 0.25MG      Last Written Prescription Date:  11/29/2016  Last Fill Quantity: 30,   # refills: 1  Last [place] Visit with [facility] or  Health prescribing provider: 11/29/2016  Future Office visit:       Routing refill request to provider for review/approval because:  Drug not on the [facility] or  [facility] refill protocol or controlled substance

## 2017-03-30 ENCOUNTER — THERAPY VISIT (OUTPATIENT)
Dept: CHIROPRACTIC MEDICINE | Facility: CLINIC | Age: 57
End: 2017-03-30
Payer: COMMERCIAL

## 2017-03-30 DIAGNOSIS — M99.02 THORACIC SEGMENT DYSFUNCTION: ICD-10-CM

## 2017-03-30 DIAGNOSIS — M54.50 CHRONIC BILATERAL LOW BACK PAIN WITHOUT SCIATICA: Primary | ICD-10-CM

## 2017-03-30 DIAGNOSIS — G89.29 CHRONIC BILATERAL LOW BACK PAIN WITHOUT SCIATICA: Primary | ICD-10-CM

## 2017-03-30 DIAGNOSIS — M53.3 SACRAL PAIN: ICD-10-CM

## 2017-03-30 DIAGNOSIS — M99.04 SOMATIC DYSFUNCTION OF SACRAL REGION: ICD-10-CM

## 2017-03-30 PROCEDURE — 98941 CHIROPRACT MANJ 3-4 REGIONS: CPT | Mod: AT | Performed by: CHIROPRACTOR

## 2017-03-30 PROCEDURE — 97810 ACUP 1/> WO ESTIM 1ST 15 MIN: CPT | Mod: GA | Performed by: CHIROPRACTOR

## 2017-03-30 NOTE — PROGRESS NOTES
Visit #:  6/2017  JUANA: 32%  Cristine: 6  Subscore: 2  NDI: 32  Authorized to 03-    Subjective:  Mariah Mart is a 54 year old female who is seen in f/u up for chronic: neck, headaches, back pain        Chronic bilateral low back pain without sciatica  Sacral pain  Somatic dysfunction of sacral region  Thoracic segment dysfunction.     Since last visit the pt reports 75% subjective improvement since initial presentation. She is pleased with her stability. The pt reports less than 2 HA per week and minimal pain in the neck area. She continues to report L sided hip pain realized when standing and performing repetitive activities. She travelled to [place] this past week and was painting most of the week. She noted an increase in L hip pain to about 8/10. Most of time she will feel a low grade pain of a 1-2/10 in the hip in the absence of increased activity. The pain is located in the L lateral hip and radiates into the L upper thigh at times. She denies weakness or other unusual sx.     Objective:  The following was observed:    P: pain elicited on palpation  A: static palpation demonstrates intersegmental asymmetry  R: motion palpation notes restricted motion  T: muscle spasm at level(s):C4, C6, T4, T6, L5 and SacrumLumbar erector spinae, T-spine paraspinal, Lev scapulae, Traps and Sub-occiput Bilaterally    Segmental spinal dysfunction/restrictions found at C2, C4, C6, C7, T3, T5, L5 and Sacrum.      Assessment:    Diagnoses:      1. Chronic bilateral low back pain without sciatica    2. Sacral pain    3. Somatic dysfunction of sacral region    4. Thoracic segment dysfunction        Patient's condition:  Pt responding well to therapy    Treatment effectiveness:  Pt stable. No increase in sx post treatment      Procedures:  CMT:  85047 Chiropractic manipulative treatment 3-4 regions performed with activator only    Thoracic:T5, T6, diversified/activator PA  Lumbar: L4, L5, activator  Pelvic: R sacral, PSIS  drop table     Modalities:  65261: Acupuncture:  Points: GV20, TH17B,  GV 20, GB30B, GV 2, GV 3, ST36B NIGHAT POINTS UPPER CERVICAL MUSCULATURE, [place] POINTS OF THE LUMBAR SPINE, GB30B, 20 MINUTES USING CLEAN NEEDLE TECHNIQUE    Therapeutic procedures:  None     Prognosis: Good    Progress towards Goals:Sleep for 6-8 hours without waking up  Reduce HA from 3/per week to 1/per week-the pt has met this goal  Walk 2 miles without issue-the pt has met this goal    Duration to achieve goal will be 6-8 weeks at a frequency of 1 per week      Recommendations: perform specific hip stretching as per protocol     Instructions:Mindful of good sitting postures    Follow-up:  Return to care 2-3 week with ACP given her improvement

## 2017-04-18 ENCOUNTER — THERAPY VISIT (OUTPATIENT)
Dept: CHIROPRACTIC MEDICINE | Facility: CLINIC | Age: 57
End: 2017-04-18
Payer: COMMERCIAL

## 2017-04-18 DIAGNOSIS — M99.04 SOMATIC DYSFUNCTION OF SACRAL REGION: ICD-10-CM

## 2017-04-18 DIAGNOSIS — M25.552 HIP PAIN, LEFT: ICD-10-CM

## 2017-04-18 DIAGNOSIS — G89.29 CHRONIC BILATERAL LOW BACK PAIN WITHOUT SCIATICA: Primary | ICD-10-CM

## 2017-04-18 DIAGNOSIS — R51.9 INTRACTABLE HEADACHE, UNSPECIFIED CHRONICITY PATTERN, UNSPECIFIED HEADACHE TYPE: ICD-10-CM

## 2017-04-18 DIAGNOSIS — M54.2 CERVICALGIA: ICD-10-CM

## 2017-04-18 DIAGNOSIS — M54.50 CHRONIC BILATERAL LOW BACK PAIN WITHOUT SCIATICA: Primary | ICD-10-CM

## 2017-04-18 DIAGNOSIS — M99.02 THORACIC SEGMENT DYSFUNCTION: ICD-10-CM

## 2017-04-18 DIAGNOSIS — M53.3 SACRAL PAIN: ICD-10-CM

## 2017-04-18 PROCEDURE — 98941 CHIROPRACT MANJ 3-4 REGIONS: CPT | Mod: AT | Performed by: CHIROPRACTOR

## 2017-04-18 PROCEDURE — 97810 ACUP 1/> WO ESTIM 1ST 15 MIN: CPT | Mod: GA | Performed by: CHIROPRACTOR

## 2017-04-18 NOTE — PROGRESS NOTES
Visit #:  8/2017  JUANA: 32%  Cristine: 6  Subscore: 2  NDI: 32  Authorized to 06/09/2017    Subjective:  Mariah Mart is a 54 year old female who is seen in f/u up for chronic: neck, headaches, back pain        Chronic bilateral low back pain without sciatica  Sacral pain  Somatic dysfunction of sacral region  Thoracic segment dysfunction  Cervicalgia  Intractable headache, unspecified chronicity pattern, unspecified headache type.     Since last visit the pt reports 75% subjective improvement overall since initial presentation. She is pleased with her stability. The pt reports one HA this past week due to being in hospital over the weekend as her son was having brain surgery. She was under stress as well therefore the L side of the neck is sore and she reports a HA graded 5-6/10 on VAS. Due to sleeping in a small bed and in awkward positions, the L hip is sore however much better overall since the prior treatment-she is now almost 80% improved in the L hip with the exception of this past week. The pt denies weakness in the extremities or other unusual sx.     Objective:  The following was observed:    P: pain elicited on palpation  A: static palpation demonstrates intersegmental asymmetry  R: motion palpation notes restricted motion  T: muscle spasm at level(s):C4, C6, T4, T6, L5 and SacrumLumbar erector spinae, T-spine paraspinal, Lev scapulae, Traps and Sub-occiput Bilaterally    Segmental spinal dysfunction/restrictions found at C2, C4, C6, C7, T3, T5, L5 and Sacrum.      Assessment:    Diagnoses:      1. Intractable headache, unspecified chronicity pattern, unspecified headache type    2. Chronic bilateral low back pain without sciatica    3. Sacral pain    4. Somatic dysfunction of sacral region    5. Thoracic segment dysfunction    6. Cervicalgia        Patient's condition:  Slight exacerbation due to stress and other factors however overall doing well.     Treatment effectiveness:  Pt stable. No increase in  sx post treatment      Procedures:  CMT:  55250 Chiropractic manipulative treatment 3-4 regions performed with activator only    Thoracic:T5, T6, diversified/activator PA  Lumbar: L4, L5, activator  Pelvic: R sacral, PSIS drop table     Modalities:  02249: Acupuncture:  Points: GV20, TH17B,  GV 20, GB30B, GV 2, GV 3, ST36B NIGHAT POINTS UPPER CERVICAL MUSCULATURE, [place] POINTS OF THE LUMBAR SPINE, GB30B, 20 MINUTES USING CLEAN NEEDLE TECHNIQUE    Therapeutic procedures:  None     Prognosis: Good    Progress towards Goals:Sleep for 6-8 hours without waking up  Reduce HA from 3/per week to 1/per week-the pt has met this goal  Walk 2 miles without issue-the pt has met this goal    Duration to achieve goal will be 6-8 weeks at a frequency of 1 per week      Recommendations: perform specific hip stretching as per protocol     Instructions:Mindful of good sitting postures    Follow-up:  Return to care 2-3 week with ACP given her improvement

## 2017-04-25 DIAGNOSIS — M54.2 CERVICALGIA: ICD-10-CM

## 2017-04-25 DIAGNOSIS — M54.50 BILATERAL LOW BACK PAIN WITHOUT SCIATICA, UNSPECIFIED CHRONICITY: ICD-10-CM

## 2017-04-26 RX ORDER — ORPHENADRINE CITRATE 100 MG/1
TABLET, EXTENDED RELEASE ORAL
Qty: 180 TABLET | Refills: 0 | Status: SHIPPED | OUTPATIENT
Start: 2017-04-26 | End: 2017-10-20

## 2017-04-26 NOTE — TELEPHONE ENCOUNTER
Norflex 100 MG      Last Written Prescription Date:  3/6/17  Last Fill Quantity: 180,   # refills: 0  [place] [facility] [facility] [place]  [facility] prescribing provider: 11/29/16  Future Office visit:       Routing refill request to provider for review/approval because:  Drug not on the [facility] protocol or controlled substance and too soon to fill by a month    Christina Spencer RN

## 2017-05-04 ENCOUNTER — THERAPY VISIT (OUTPATIENT)
Dept: CHIROPRACTIC MEDICINE | Facility: CLINIC | Age: 57
End: 2017-05-04
Payer: COMMERCIAL

## 2017-05-04 DIAGNOSIS — G44.201 INTRACTABLE TENSION-TYPE HEADACHE, UNSPECIFIED CHRONICITY PATTERN: ICD-10-CM

## 2017-05-04 DIAGNOSIS — G89.29 CHRONIC BILATERAL LOW BACK PAIN WITHOUT SCIATICA: ICD-10-CM

## 2017-05-04 DIAGNOSIS — M53.3 SACRAL PAIN: ICD-10-CM

## 2017-05-04 DIAGNOSIS — R51.9 INTRACTABLE HEADACHE, UNSPECIFIED CHRONICITY PATTERN, UNSPECIFIED HEADACHE TYPE: ICD-10-CM

## 2017-05-04 DIAGNOSIS — M54.50 CHRONIC BILATERAL LOW BACK PAIN WITHOUT SCIATICA: ICD-10-CM

## 2017-05-04 DIAGNOSIS — M54.2 CERVICALGIA: Primary | ICD-10-CM

## 2017-05-04 DIAGNOSIS — M99.02 THORACIC SEGMENT DYSFUNCTION: ICD-10-CM

## 2017-05-04 DIAGNOSIS — M99.04 SOMATIC DYSFUNCTION OF SACRAL REGION: ICD-10-CM

## 2017-05-04 DIAGNOSIS — M25.552 HIP PAIN, LEFT: ICD-10-CM

## 2017-05-04 PROCEDURE — 97810 ACUP 1/> WO ESTIM 1ST 15 MIN: CPT | Mod: GA | Performed by: CHIROPRACTOR

## 2017-05-04 PROCEDURE — 98941 CHIROPRACT MANJ 3-4 REGIONS: CPT | Mod: AT | Performed by: CHIROPRACTOR

## 2017-05-04 NOTE — PROGRESS NOTES
Visit #:  9/2017  JUANA: 32%  Cristine: 6  Subscore: 2  NDI: 32  Authorized to 06/09/2017    Subjective:  Mariah Mart is a 54 year old female who is seen in f/u up for chronic: neck, headaches, back pain        Hip pain, left  Chronic bilateral low back pain without sciatica  Sacral pain  Somatic dysfunction of sacral region  Thoracic segment dysfunction  Intractable headache, unspecified chronicity pattern, unspecified headache type  Intractable tension-type headache, unspecified chronicity pattern  Cervicalgia.     Since last visit the pt reports 75% subjective improvement overall since initial presentation. She has not changed since the previous treatment. She continues to feel stress due to family issues. She noted a slight increase in R sided neck pain and HA sx in addition to L sided hip pain. The pt denies weakness in the extremities or other unusual sx.     Objective:  The following was observed:    P: pain elicited on palpation  A: static palpation demonstrates intersegmental asymmetry  R: motion palpation notes restricted motion  T: muscle spasm at level(s):C4, C6, T4, T6, L5 and SacrumLumbar erector spinae, T-spine paraspinal, Lev scapulae, Traps and Sub-occiput Bilaterally    Segmental spinal dysfunction/restrictions found at C2, C4, C6, C7, T3, T5, L5 and Sacrum.      Assessment:    Diagnoses:      1. Hip pain, left    2. Chronic bilateral low back pain without sciatica    3. Sacral pain    4. Somatic dysfunction of sacral region    5. Thoracic segment dysfunction    6. Intractable headache, unspecified chronicity pattern, unspecified headache type    7. Intractable tension-type headache, unspecified chronicity pattern    8. Cervicalgia        Patient's condition:  No change     Treatment effectiveness:  Pt stable. No increase in sx post treatment      Procedures:  CMT:  20991 Chiropractic manipulative treatment 3-4 regions performed with activator only    Thoracic:T5, T6, diversified/activator  PA  Lumbar: L4, L5, activator  Pelvic: R sacral, PSIS drop table     Modalities:  43474: Acupuncture:  Points: GV20, TH17B,  GV 20, GB30B, GV 2, GV 3, ST36B NIGHAT POINTS UPPER CERVICAL MUSCULATURE, [place] POINTS OF THE LUMBAR SPINE, GB30B, 20 MINUTES USING CLEAN NEEDLE TECHNIQUE    Therapeutic procedures:  None     Prognosis: Good    Progress towards Goals:Sleep for 6-8 hours without waking up  Reduce HA from 3/per week to 1/per week-the pt has met this goal  Walk 2 miles without issue-the pt has met this goal    Duration to achieve goal will be 6-8 weeks at a frequency of 1 per week      Recommendations: perform specific hip stretching as per protocol     Instructions:Mindful of good sitting postures    Follow-up:  Return to care 2-3 week with ACP given her improvement

## 2017-05-23 ENCOUNTER — THERAPY VISIT (OUTPATIENT)
Dept: CHIROPRACTIC MEDICINE | Facility: CLINIC | Age: 57
End: 2017-05-23
Payer: COMMERCIAL

## 2017-05-23 DIAGNOSIS — G89.29 CHRONIC BILATERAL LOW BACK PAIN WITHOUT SCIATICA: ICD-10-CM

## 2017-05-23 DIAGNOSIS — M99.04 SOMATIC DYSFUNCTION OF SACRAL REGION: ICD-10-CM

## 2017-05-23 DIAGNOSIS — M25.552 HIP PAIN, LEFT: Primary | ICD-10-CM

## 2017-05-23 DIAGNOSIS — M53.3 SACRAL PAIN: ICD-10-CM

## 2017-05-23 DIAGNOSIS — R51.9 INTRACTABLE HEADACHE, UNSPECIFIED CHRONICITY PATTERN, UNSPECIFIED HEADACHE TYPE: ICD-10-CM

## 2017-05-23 DIAGNOSIS — M54.50 CHRONIC BILATERAL LOW BACK PAIN WITHOUT SCIATICA: ICD-10-CM

## 2017-05-23 DIAGNOSIS — M99.02 THORACIC SEGMENT DYSFUNCTION: ICD-10-CM

## 2017-05-23 DIAGNOSIS — M54.2 CERVICALGIA: ICD-10-CM

## 2017-05-23 DIAGNOSIS — G44.201 INTRACTABLE TENSION-TYPE HEADACHE, UNSPECIFIED CHRONICITY PATTERN: ICD-10-CM

## 2017-05-23 PROCEDURE — 98941 CHIROPRACT MANJ 3-4 REGIONS: CPT | Mod: AT | Performed by: CHIROPRACTOR

## 2017-05-23 PROCEDURE — 97810 ACUP 1/> WO ESTIM 1ST 15 MIN: CPT | Mod: GA | Performed by: CHIROPRACTOR

## 2017-05-28 NOTE — PROGRESS NOTES
Visit #:  10/2017  JUANA: 32%  Cristine: 6  Subscore: 2  NDI: 32  Authorized to 06/09/2017    Subjective:  Mariah Mart is a 54 year old female who is seen in f/u up for chronic: neck, headaches, back pain        Hip pain, left  Chronic bilateral low back pain without sciatica  Sacral pain  Somatic dysfunction of sacral region  Thoracic segment dysfunction  Intractable headache, unspecified chronicity pattern, unspecified headache type  Intractable tension-type headache, unspecified chronicity pattern  Cervicalgia.     Since last visit the pt reports 80-85% subjective improvement overall since initial presentation. She has not changed since the previous treatment. She continues to feel stress due to family issues. In the past week she notes a slight increase in L hip pain however mild. She now reports a reduction in HA sx to 1/week rather than 7 days per week. The pt denies weakness in the extremities or other unusual sx.     Objective:  The following was observed:    P: pain elicited on palpation  A: static palpation demonstrates intersegmental asymmetry  R: motion palpation notes restricted motion  T: muscle spasm at level(s):C4, C6, T4, T6, L5 and SacrumLumbar erector spinae, T-spine paraspinal, Lev scapulae, Traps and Sub-occiput Bilaterally    Segmental spinal dysfunction/restrictions found at C2, C4, C6, C7, T3, T5, L5 and Sacrum.      Assessment:    Diagnoses:      1. Hip pain, left    2. Chronic bilateral low back pain without sciatica    3. Sacral pain    4. Somatic dysfunction of sacral region    5. Thoracic segment dysfunction    6. Intractable headache, unspecified chronicity pattern, unspecified headache type    7. Intractable tension-type headache, unspecified chronicity pattern    8. Cervicalgia        Patient's condition:  Pt responding well to therapy-reduction of sx    Treatment effectiveness:  Pt stable. No increase in sx post treatment      Procedures:  CMT:  71480 Chiropractic manipulative  treatment 3-4 regions performed with activator only    Thoracic:T5, T6, diversified/activator PA  Lumbar: L4, L5, activator  Pelvic: R sacral, PSIS drop table     Modalities:  38618: Acupuncture:  Points: GV20, TH17B,  GV 20, GB30B, GV 2, GV 3, ST36B NIGHAT POINTS UPPER CERVICAL MUSCULATURE, [place] POINTS OF THE LUMBAR SPINE, GB30B, 20 MINUTES USING CLEAN NEEDLE TECHNIQUE    Therapeutic procedures:  None     Prognosis: Good    Progress towards Goals:Sleep for 6-8 hours without waking up  Reduce HA from 3/per week to 1/per week-the pt has met this goal  Walk 2 miles without issue-the pt has met this goal    Duration to achieve goal will be 6-8 weeks at a frequency of 1 per week      Recommendations: perform specific hip stretching as per protocol     Instructions:Mindful of good sitting postures    Follow-up:  Return to care 3-4 weeks with reduction of care

## 2017-06-13 ENCOUNTER — THERAPY VISIT (OUTPATIENT)
Dept: CHIROPRACTIC MEDICINE | Facility: CLINIC | Age: 57
End: 2017-06-13
Payer: COMMERCIAL

## 2017-06-13 DIAGNOSIS — M53.3 SACRAL PAIN: ICD-10-CM

## 2017-06-13 DIAGNOSIS — G89.29 CHRONIC BILATERAL LOW BACK PAIN WITHOUT SCIATICA: Primary | ICD-10-CM

## 2017-06-13 DIAGNOSIS — G44.201 INTRACTABLE TENSION-TYPE HEADACHE, UNSPECIFIED CHRONICITY PATTERN: ICD-10-CM

## 2017-06-13 DIAGNOSIS — M99.04 SOMATIC DYSFUNCTION OF SACRAL REGION: ICD-10-CM

## 2017-06-13 DIAGNOSIS — M99.02 THORACIC SEGMENT DYSFUNCTION: ICD-10-CM

## 2017-06-13 DIAGNOSIS — M54.50 CHRONIC BILATERAL LOW BACK PAIN WITHOUT SCIATICA: Primary | ICD-10-CM

## 2017-06-13 DIAGNOSIS — R51.9 INTRACTABLE HEADACHE, UNSPECIFIED CHRONICITY PATTERN, UNSPECIFIED HEADACHE TYPE: ICD-10-CM

## 2017-06-13 DIAGNOSIS — M25.552 HIP PAIN, LEFT: ICD-10-CM

## 2017-06-13 DIAGNOSIS — M54.2 CERVICALGIA: ICD-10-CM

## 2017-06-13 PROCEDURE — 97810 ACUP 1/> WO ESTIM 1ST 15 MIN: CPT | Mod: GA | Performed by: CHIROPRACTOR

## 2017-06-13 PROCEDURE — 98941 CHIROPRACT MANJ 3-4 REGIONS: CPT | Mod: AT | Performed by: CHIROPRACTOR

## 2017-06-13 NOTE — PROGRESS NOTES
Visit #:  11/2017  JUANA: 32%  Cristine: 6  Subscore: 2  NDI: 32  Authorized to 06/09/2017    Subjective:  Mariah Mart is a 54 year old female who is seen in f/u up for chronic: neck, headaches, back pain        Hip pain, left  Chronic bilateral low back pain without sciatica  Sacral pain  Somatic dysfunction of sacral region  Thoracic segment dysfunction  Intractable headache, unspecified chronicity pattern, unspecified headache type  Intractable tension-type headache, unspecified chronicity pattern  Cervicalgia.     Since last visit the pt reports 90% subjective improvement overall since initial presentation. She is pleased with her improvement. The pt denies HA for 2 weeks. She is able to perform ADLS without issue. The pt denies significant pain in the L hip area. She denies weakness or other unusual sx.       Objective:  The following was observed:    P: pain elicited on palpation  A: static palpation demonstrates intersegmental asymmetry  R: motion palpation notes restricted motion  T: muscle spasm at level(s):C4, C6, T4, T6, L5 and SacrumLumbar erector spinae, T-spine paraspinal, Lev scapulae, Traps and Sub-occiput Bilaterally    Segmental spinal dysfunction/restrictions found at C2, C4, C6, C7, T3, T5, L5 and Sacrum.      Assessment:    Diagnoses:      1. Hip pain, left    2. Chronic bilateral low back pain without sciatica    3. Sacral pain    4. Somatic dysfunction of sacral region    5. Thoracic segment dysfunction    6. Intractable headache, unspecified chronicity pattern, unspecified headache type    7. Intractable tension-type headache, unspecified chronicity pattern    8. Cervicalgia        Patient's condition:  Pt @ MMI and is released from this episode     Treatment effectiveness:  Pt stable. No increase in sx post treatment      Procedures:  CMT:  95825 Chiropractic manipulative treatment 3-4 regions performed with activator only  Cervical: C2, R   Thoracic:T5, T6, diversified/activator  PA  Lumbar: L4, L5, activator  Pelvic: R sacral, PSIS drop table     Modalities:  69494: Acupuncture:  Points: GV20, TH17B,  GV 20, GB30B, GV 2, GV 3, ST36B NIGHAT POINTS UPPER CERVICAL MUSCULATURE, [place] POINTS OF THE LUMBAR SPINE, GB30B, 20 MINUTES USING CLEAN NEEDLE TECHNIQUE    Therapeutic procedures:  None     Prognosis: Good    Progress towards Goals:Sleep for 6-8 hours without waking up  Reduce HA from 3/per week to 1/per week-the pt has met this goal  Walk 2 miles without issue-the pt has met this goal    Duration to achieve goal will be 6-8 weeks at a frequency of 1 per week      Recommendations: perform specific hip stretching as per protocol     Instructions:Mindful of good sitting postures    Follow-up:  Return to care: none   Pt is released PRN, if sx persist

## 2017-10-20 DIAGNOSIS — M54.50 BILATERAL LOW BACK PAIN WITHOUT SCIATICA, UNSPECIFIED CHRONICITY: ICD-10-CM

## 2017-10-20 DIAGNOSIS — M54.2 CERVICALGIA: ICD-10-CM

## 2017-10-20 DIAGNOSIS — F41.0 PANIC DISORDER WITHOUT AGORAPHOBIA: ICD-10-CM

## 2017-10-20 DIAGNOSIS — G44.89 OTHER HEADACHE SYNDROME: ICD-10-CM

## 2017-10-20 DIAGNOSIS — I10 ESSENTIAL HYPERTENSION, BENIGN: ICD-10-CM

## 2017-10-20 RX ORDER — ORPHENADRINE CITRATE 100 MG/1
TABLET, EXTENDED RELEASE ORAL
Qty: 180 TABLET | Refills: 0 | Status: SHIPPED | OUTPATIENT
Start: 2017-10-20 | End: 2018-02-02

## 2017-10-20 NOTE — TELEPHONE ENCOUNTER
orphenadrine (NORFLEX) 100 MG 12 hr tablet  Last Written Prescription Date:  4/26/17  Last Fill Quantity: 180,   # refills: 0  Future Office visit:       Routing refill request to provider for review/approval because:  Drug not on the [facility], [facility] or [facility] refill protocol or controlled substance

## 2017-10-20 NOTE — TELEPHONE ENCOUNTER
amLODIPine (NORVASC) 5 MG tablet  Last Written Prescription Date: 11/29/16  Last Fill Quantity: 90, # refills: 3    Last Office Visit with [facility], [facility] or [facility] prescribing provider:  11/29/16   Future Office Visit:        BP Readings from Last 3 Encounters:   11/29/16 132/78   09/11/15 138/80   07/31/15 158/90           lisinopril (PRINIVIL,ZESTRIL) 20 MG tablet  Last Written Prescription Date: 11/29/16  Last Fill Quantity: 90, # refills: 3  Last Office Visit with [facility], [facility] or [facility] prescribing provider: 11/29/16       Potassium   Date Value Ref Range Status   12/22/2016 4.0 3.4 - 5.3 mmol/L Final     Creatinine   Date Value Ref Range Status   12/22/2016 0.68 0.52 - 1.04 mg/dL Final     BP Readings from Last 3 Encounters:   11/29/16 132/78   09/11/15 138/80   07/31/15 158/90         DULoxetine (CYMBALTA) 60 MG capsule  Last Written Prescription Date: 11/29/16  Last Fill Quantity: 90, # refills: 3  Last Office Visit with [facility], [facility] or [facility] prescribing provider: 11/29/16        BP Readings from Last 3 Encounters:   11/29/16 132/78   09/11/15 138/80   07/31/15 158/90     Pulse: (for Fetzima)  Creatinine   Date Value Ref Range Status   12/22/2016 0.68 0.52 - 1.04 mg/dL Final   ]    Last PHQ-9 score on record=   PHQ-9 SCORE 11/29/2016   Total Score -   Total Score 8

## 2017-10-23 RX ORDER — LISINOPRIL 20 MG/1
20 TABLET ORAL DAILY
Qty: 90 TABLET | Refills: 0 | Status: SHIPPED | OUTPATIENT
Start: 2017-10-23 | End: 2018-01-19

## 2017-10-23 RX ORDER — AMLODIPINE BESYLATE 5 MG/1
5 TABLET ORAL DAILY
Qty: 90 TABLET | Refills: 0 | Status: SHIPPED | OUTPATIENT
Start: 2017-10-23 | End: 2018-01-19

## 2017-10-23 RX ORDER — DULOXETIN HYDROCHLORIDE 60 MG/1
60 CAPSULE, DELAYED RELEASE ORAL DAILY
Qty: 90 CAPSULE | Refills: 0 | Status: SHIPPED | OUTPATIENT
Start: 2017-10-23 | End: 2018-01-19

## 2017-10-23 NOTE — TELEPHONE ENCOUNTER
Prescription approved per [facility] Refill Protocol.  Will be due for visit end of 11/2017. Noted in pharmacy comments.

## 2017-11-13 ENCOUNTER — TRANSFERRED RECORDS (OUTPATIENT)
Dept: HEALTH INFORMATION MANAGEMENT | Facility: CLINIC | Age: 57
End: 2017-11-13

## 2018-01-10 ENCOUNTER — ANESTHESIA (OUTPATIENT)
Dept: SURGERY | Facility: CLINIC | Age: 58
End: 2018-01-10
Payer: COMMERCIAL

## 2018-01-10 ENCOUNTER — ANESTHESIA EVENT (OUTPATIENT)
Dept: SURGERY | Facility: CLINIC | Age: 58
End: 2018-01-10
Payer: COMMERCIAL

## 2018-01-10 ENCOUNTER — HOSPITAL ENCOUNTER (OUTPATIENT)
Facility: CLINIC | Age: 58
Discharge: HOME | End: 2018-01-10
Attending: EMERGENCY MEDICINE | Admitting: SURGERY
Payer: COMMERCIAL

## 2018-01-10 ENCOUNTER — APPOINTMENT (OUTPATIENT)
Dept: CT IMAGING | Facility: CLINIC | Age: 58
End: 2018-01-10
Attending: EMERGENCY MEDICINE
Payer: COMMERCIAL

## 2018-01-10 VITALS
HEART RATE: 88 BPM | SYSTOLIC BLOOD PRESSURE: 145 MMHG | RESPIRATION RATE: 16 BRPM | TEMPERATURE: 99.1 F | DIASTOLIC BLOOD PRESSURE: 84 MMHG | OXYGEN SATURATION: 94 %

## 2018-01-10 DIAGNOSIS — K35.891 ACUTE GANGRENOUS APPENDICITIS: Primary | ICD-10-CM

## 2018-01-10 DIAGNOSIS — K35.30 ACUTE APPENDICITIS WITH LOCALIZED PERITONITIS: ICD-10-CM

## 2018-01-10 LAB
ALBUMIN UR-MCNC: NEGATIVE MG/DL
ANION GAP SERPL CALCULATED.3IONS-SCNC: 8 MMOL/L (ref 3–14)
APPEARANCE UR: CLEAR
BACTERIA SPEC CULT: NORMAL
BILIRUB UR QL STRIP: NEGATIVE
BUN SERPL-MCNC: 8 MG/DL (ref 7–30)
CALCIUM SERPL-MCNC: 9.2 MG/DL (ref 8.5–10.1)
CHLORIDE SERPL-SCNC: 100 MMOL/L (ref 94–109)
CO2 SERPL-SCNC: 26 MMOL/L (ref 20–32)
COLOR UR AUTO: YELLOW
CREAT SERPL-MCNC: 0.57 MG/DL (ref 0.52–1.04)
ERYTHROCYTE [DISTWIDTH] IN BLOOD BY AUTOMATED COUNT: 12.7 % (ref 10–15)
GFR SERPL CREATININE-BSD FRML MDRD: >90 ML/MIN/1.7M2
GLUCOSE SERPL-MCNC: 135 MG/DL (ref 70–99)
GLUCOSE UR STRIP-MCNC: NEGATIVE MG/DL
GRAM STN SPEC: NORMAL
GRAM STN SPEC: NORMAL
HCG SERPL QL: NEGATIVE
HCT VFR BLD AUTO: 43 % (ref 35–47)
HGB BLD-MCNC: 14.6 G/DL (ref 11.7–15.7)
HGB UR QL STRIP: ABNORMAL
KETONES UR STRIP-MCNC: 5 MG/DL
LEUKOCYTE ESTERASE UR QL STRIP: NEGATIVE
MCH RBC QN AUTO: 31 PG (ref 26.5–33)
MCHC RBC AUTO-ENTMCNC: 34 G/DL (ref 31.5–36.5)
MCV RBC AUTO: 91 FL (ref 78–100)
MUCOUS THREADS #/AREA URNS LPF: PRESENT /LPF
NITRATE UR QL: NEGATIVE
PH UR STRIP: 7 PH (ref 5–7)
PLATELET # BLD AUTO: 364 10E9/L (ref 150–450)
POTASSIUM SERPL-SCNC: 4.2 MMOL/L (ref 3.4–5.3)
RBC # BLD AUTO: 4.71 10E12/L (ref 3.8–5.2)
RBC #/AREA URNS AUTO: 6 /HPF (ref 0–2)
SODIUM SERPL-SCNC: 134 MMOL/L (ref 133–144)
SOURCE: ABNORMAL
SP GR UR STRIP: 1.03 (ref 1–1.03)
SPECIMEN SOURCE: NORMAL
SPECIMEN SOURCE: NORMAL
SQUAMOUS #/AREA URNS AUTO: <1 /HPF (ref 0–1)
UROBILINOGEN UR STRIP-MCNC: 0 MG/DL (ref 0–2)
WBC # BLD AUTO: 12.8 10E9/L (ref 4–11)
WBC #/AREA URNS AUTO: 1 /HPF (ref 0–2)

## 2018-01-10 PROCEDURE — 44970 LAPAROSCOPY APPENDECTOMY: CPT | Performed by: SURGERY

## 2018-01-10 PROCEDURE — 27210794 ZZH OR GENERAL SUPPLY STERILE: Performed by: SURGERY

## 2018-01-10 PROCEDURE — 96375 TX/PRO/DX INJ NEW DRUG ADDON: CPT

## 2018-01-10 PROCEDURE — 25000128 H RX IP 250 OP 636: Performed by: EMERGENCY MEDICINE

## 2018-01-10 PROCEDURE — 99285 EMERGENCY DEPT VISIT HI MDM: CPT | Mod: 25

## 2018-01-10 PROCEDURE — 99204 OFFICE O/P NEW MOD 45 MIN: CPT | Mod: 57 | Performed by: SURGERY

## 2018-01-10 PROCEDURE — 37000009 ZZH ANESTHESIA TECHNICAL FEE, EACH ADDTL 15 MIN: Performed by: SURGERY

## 2018-01-10 PROCEDURE — 80048 BASIC METABOLIC PNL TOTAL CA: CPT | Performed by: EMERGENCY MEDICINE

## 2018-01-10 PROCEDURE — 25000125 ZZHC RX 250: Performed by: SURGERY

## 2018-01-10 PROCEDURE — 40000306 ZZH STATISTIC PRE PROC ASSESS II: Performed by: SURGERY

## 2018-01-10 PROCEDURE — 36000058 ZZH SURGERY LEVEL 3 EA 15 ADDTL MIN: Performed by: SURGERY

## 2018-01-10 PROCEDURE — 25000128 H RX IP 250 OP 636: Performed by: ANESTHESIOLOGY

## 2018-01-10 PROCEDURE — 96374 THER/PROPH/DIAG INJ IV PUSH: CPT | Mod: 59

## 2018-01-10 PROCEDURE — 87070 CULTURE OTHR SPECIMN AEROBIC: CPT | Performed by: SURGERY

## 2018-01-10 PROCEDURE — 88304 TISSUE EXAM BY PATHOLOGIST: CPT | Performed by: SURGERY

## 2018-01-10 PROCEDURE — 96376 TX/PRO/DX INJ SAME DRUG ADON: CPT

## 2018-01-10 PROCEDURE — 25000566 ZZH SEVOFLURANE, EA 15 MIN: Performed by: SURGERY

## 2018-01-10 PROCEDURE — 87205 SMEAR GRAM STAIN: CPT | Performed by: SURGERY

## 2018-01-10 PROCEDURE — 25000132 ZZH RX MED GY IP 250 OP 250 PS 637: Performed by: SURGERY

## 2018-01-10 PROCEDURE — 88304 TISSUE EXAM BY PATHOLOGIST: CPT | Mod: 26 | Performed by: SURGERY

## 2018-01-10 PROCEDURE — 25800025 ZZH RX 258: Performed by: SURGERY

## 2018-01-10 PROCEDURE — 87075 CULTR BACTERIA EXCEPT BLOOD: CPT | Performed by: SURGERY

## 2018-01-10 PROCEDURE — 93005 ELECTROCARDIOGRAM TRACING: CPT

## 2018-01-10 PROCEDURE — 84703 CHORIONIC GONADOTROPIN ASSAY: CPT | Performed by: EMERGENCY MEDICINE

## 2018-01-10 PROCEDURE — 36000056 ZZH SURGERY LEVEL 3 1ST 30 MIN: Performed by: SURGERY

## 2018-01-10 PROCEDURE — 85027 COMPLETE CBC AUTOMATED: CPT | Performed by: EMERGENCY MEDICINE

## 2018-01-10 PROCEDURE — 25000128 H RX IP 250 OP 636: Performed by: NURSE ANESTHETIST, CERTIFIED REGISTERED

## 2018-01-10 PROCEDURE — 71000012 ZZH RECOVERY PHASE 1 LEVEL 1 FIRST HR: Performed by: SURGERY

## 2018-01-10 PROCEDURE — 40000065 ZZH STATISTIC EKG NON-CHARGEABLE

## 2018-01-10 PROCEDURE — 27210995 ZZH RX 272: Performed by: EMERGENCY MEDICINE

## 2018-01-10 PROCEDURE — 81001 URINALYSIS AUTO W/SCOPE: CPT | Performed by: EMERGENCY MEDICINE

## 2018-01-10 PROCEDURE — 37000008 ZZH ANESTHESIA TECHNICAL FEE, 1ST 30 MIN: Performed by: SURGERY

## 2018-01-10 PROCEDURE — 71000027 ZZH RECOVERY PHASE 2 EACH 15 MINS: Performed by: SURGERY

## 2018-01-10 PROCEDURE — 25000125 ZZHC RX 250: Performed by: EMERGENCY MEDICINE

## 2018-01-10 PROCEDURE — 74177 CT ABD & PELVIS W/CONTRAST: CPT

## 2018-01-10 PROCEDURE — 25000125 ZZHC RX 250: Performed by: NURSE ANESTHETIST, CERTIFIED REGISTERED

## 2018-01-10 RX ORDER — DIAZEPAM 5 MG
5 TABLET ORAL EVERY 12 HOURS PRN
Qty: 3 TABLET | Refills: 0 | Status: SHIPPED | OUTPATIENT
Start: 2018-01-10 | End: 2018-02-02

## 2018-01-10 RX ORDER — ONDANSETRON 4 MG/1
4-8 TABLET, ORALLY DISINTEGRATING ORAL EVERY 8 HOURS PRN
Qty: 4 TABLET | Refills: 0 | Status: SHIPPED | OUTPATIENT
Start: 2018-01-10 | End: 2018-02-02

## 2018-01-10 RX ORDER — HYDROMORPHONE HYDROCHLORIDE 1 MG/ML
0.5 INJECTION, SOLUTION INTRAMUSCULAR; INTRAVENOUS; SUBCUTANEOUS ONCE
Status: COMPLETED | OUTPATIENT
Start: 2018-01-10 | End: 2018-01-10

## 2018-01-10 RX ORDER — PROPOFOL 10 MG/ML
INJECTION, EMULSION INTRAVENOUS PRN
Status: DISCONTINUED | OUTPATIENT
Start: 2018-01-10 | End: 2018-01-10

## 2018-01-10 RX ORDER — IBUPROFEN 600 MG/1
600 TABLET, FILM COATED ORAL
Status: DISCONTINUED | OUTPATIENT
Start: 2018-01-10 | End: 2018-01-10 | Stop reason: HOSPADM

## 2018-01-10 RX ORDER — BUPIVACAINE HYDROCHLORIDE 2.5 MG/ML
INJECTION, SOLUTION EPIDURAL; INFILTRATION; INTRACAUDAL PRN
Status: DISCONTINUED | OUTPATIENT
Start: 2018-01-10 | End: 2018-01-10 | Stop reason: HOSPADM

## 2018-01-10 RX ORDER — DEXAMETHASONE SODIUM PHOSPHATE 4 MG/ML
INJECTION, SOLUTION INTRA-ARTICULAR; INTRALESIONAL; INTRAMUSCULAR; INTRAVENOUS; SOFT TISSUE PRN
Status: DISCONTINUED | OUTPATIENT
Start: 2018-01-10 | End: 2018-01-10

## 2018-01-10 RX ORDER — NEOSTIGMINE METHYLSULFATE 1 MG/ML
VIAL (ML) INJECTION PRN
Status: DISCONTINUED | OUTPATIENT
Start: 2018-01-10 | End: 2018-01-10

## 2018-01-10 RX ORDER — NALOXONE HYDROCHLORIDE 0.4 MG/ML
.1-.4 INJECTION, SOLUTION INTRAMUSCULAR; INTRAVENOUS; SUBCUTANEOUS
Status: DISCONTINUED | OUTPATIENT
Start: 2018-01-10 | End: 2018-01-10 | Stop reason: HOSPADM

## 2018-01-10 RX ORDER — GLYCOPYRROLATE 0.2 MG/ML
INJECTION, SOLUTION INTRAMUSCULAR; INTRAVENOUS PRN
Status: DISCONTINUED | OUTPATIENT
Start: 2018-01-10 | End: 2018-01-10

## 2018-01-10 RX ORDER — LANSOPRAZOLE 30 MG/1
30 CAPSULE, DELAYED RELEASE ORAL AT BEDTIME
COMMUNITY

## 2018-01-10 RX ORDER — CEFOTETAN DISODIUM 2 G/20ML
2 INJECTION, POWDER, FOR SOLUTION INTRAMUSCULAR; INTRAVENOUS ONCE
Status: COMPLETED | OUTPATIENT
Start: 2018-01-10 | End: 2018-01-10

## 2018-01-10 RX ORDER — ALBUTEROL SULFATE 0.83 MG/ML
2.5 SOLUTION RESPIRATORY (INHALATION) EVERY 4 HOURS PRN
Status: DISCONTINUED | OUTPATIENT
Start: 2018-01-10 | End: 2018-01-10 | Stop reason: HOSPADM

## 2018-01-10 RX ORDER — PROPOFOL 10 MG/ML
INJECTION, EMULSION INTRAVENOUS CONTINUOUS PRN
Status: DISCONTINUED | OUTPATIENT
Start: 2018-01-10 | End: 2018-01-10

## 2018-01-10 RX ORDER — HYDROCODONE BITARTRATE AND ACETAMINOPHEN 5; 325 MG/1; MG/1
1 TABLET ORAL
Status: COMPLETED | OUTPATIENT
Start: 2018-01-10 | End: 2018-01-10

## 2018-01-10 RX ORDER — HYDROMORPHONE HYDROCHLORIDE 1 MG/ML
.3-.5 INJECTION, SOLUTION INTRAMUSCULAR; INTRAVENOUS; SUBCUTANEOUS EVERY 10 MIN PRN
Status: DISCONTINUED | OUTPATIENT
Start: 2018-01-10 | End: 2018-01-10 | Stop reason: HOSPADM

## 2018-01-10 RX ORDER — TOCOPHERSOLAN (VITAMIN E TPGS) 400/15ML
2 LIQUID (ML) ORAL AT BEDTIME
COMMUNITY

## 2018-01-10 RX ORDER — HYDROCODONE BITARTRATE AND ACETAMINOPHEN 5; 325 MG/1; MG/1
1-2 TABLET ORAL EVERY 4 HOURS PRN
Qty: 20 TABLET | Refills: 0 | Status: SHIPPED | OUTPATIENT
Start: 2018-01-10 | End: 2018-02-02

## 2018-01-10 RX ORDER — FENTANYL CITRATE 50 UG/ML
25-50 INJECTION, SOLUTION INTRAMUSCULAR; INTRAVENOUS
Status: DISCONTINUED | OUTPATIENT
Start: 2018-01-10 | End: 2018-01-10 | Stop reason: HOSPADM

## 2018-01-10 RX ORDER — LIDOCAINE 40 MG/G
CREAM TOPICAL
Status: DISCONTINUED | OUTPATIENT
Start: 2018-01-10 | End: 2018-01-10 | Stop reason: HOSPADM

## 2018-01-10 RX ORDER — IOPAMIDOL 755 MG/ML
500 INJECTION, SOLUTION INTRAVASCULAR ONCE
Status: COMPLETED | OUTPATIENT
Start: 2018-01-10 | End: 2018-01-10

## 2018-01-10 RX ORDER — ONDANSETRON 2 MG/ML
4 INJECTION INTRAMUSCULAR; INTRAVENOUS ONCE
Status: COMPLETED | OUTPATIENT
Start: 2018-01-10 | End: 2018-01-10

## 2018-01-10 RX ORDER — FENTANYL CITRATE 50 UG/ML
INJECTION, SOLUTION INTRAMUSCULAR; INTRAVENOUS PRN
Status: DISCONTINUED | OUTPATIENT
Start: 2018-01-10 | End: 2018-01-10

## 2018-01-10 RX ORDER — ONDANSETRON 2 MG/ML
INJECTION INTRAMUSCULAR; INTRAVENOUS PRN
Status: DISCONTINUED | OUTPATIENT
Start: 2018-01-10 | End: 2018-01-10

## 2018-01-10 RX ORDER — ONDANSETRON 2 MG/ML
4 INJECTION INTRAMUSCULAR; INTRAVENOUS EVERY 30 MIN PRN
Status: DISCONTINUED | OUTPATIENT
Start: 2018-01-10 | End: 2018-01-10 | Stop reason: HOSPADM

## 2018-01-10 RX ORDER — LIDOCAINE HYDROCHLORIDE 10 MG/ML
INJECTION, SOLUTION INFILTRATION; PERINEURAL PRN
Status: DISCONTINUED | OUTPATIENT
Start: 2018-01-10 | End: 2018-01-10

## 2018-01-10 RX ORDER — PROMETHAZINE HYDROCHLORIDE 25 MG/ML
6.25 INJECTION, SOLUTION INTRAMUSCULAR; INTRAVENOUS
Status: DISCONTINUED | OUTPATIENT
Start: 2018-01-10 | End: 2018-01-10 | Stop reason: HOSPADM

## 2018-01-10 RX ORDER — SODIUM CHLORIDE, SODIUM LACTATE, POTASSIUM CHLORIDE, CALCIUM CHLORIDE 600; 310; 30; 20 MG/100ML; MG/100ML; MG/100ML; MG/100ML
INJECTION, SOLUTION INTRAVENOUS CONTINUOUS
Status: DISCONTINUED | OUTPATIENT
Start: 2018-01-10 | End: 2018-01-10 | Stop reason: HOSPADM

## 2018-01-10 RX ORDER — LIDOCAINE 50 MG/G
2 PATCH TOPICAL EVERY 24 HOURS
COMMUNITY
End: 2018-02-02

## 2018-01-10 RX ORDER — ONDANSETRON 4 MG/1
4 TABLET, ORALLY DISINTEGRATING ORAL EVERY 30 MIN PRN
Status: DISCONTINUED | OUTPATIENT
Start: 2018-01-10 | End: 2018-01-10 | Stop reason: HOSPADM

## 2018-01-10 RX ORDER — HYDROMORPHONE HYDROCHLORIDE 1 MG/ML
0.5 INJECTION, SOLUTION INTRAMUSCULAR; INTRAVENOUS; SUBCUTANEOUS
Status: DISCONTINUED | OUTPATIENT
Start: 2018-01-10 | End: 2018-01-10 | Stop reason: HOSPADM

## 2018-01-10 RX ORDER — MEPERIDINE HYDROCHLORIDE 25 MG/ML
12.5 INJECTION INTRAMUSCULAR; INTRAVENOUS; SUBCUTANEOUS
Status: DISCONTINUED | OUTPATIENT
Start: 2018-01-10 | End: 2018-01-10 | Stop reason: HOSPADM

## 2018-01-10 RX ORDER — METOPROLOL TARTRATE 1 MG/ML
1-2 INJECTION, SOLUTION INTRAVENOUS EVERY 5 MIN PRN
Status: DISCONTINUED | OUTPATIENT
Start: 2018-01-10 | End: 2018-01-10 | Stop reason: HOSPADM

## 2018-01-10 RX ADMIN — Medication 3 MG: at 16:55

## 2018-01-10 RX ADMIN — IOPAMIDOL 85 ML: 755 INJECTION, SOLUTION INTRAVENOUS at 13:55

## 2018-01-10 RX ADMIN — DEXAMETHASONE SODIUM PHOSPHATE 4 MG: 4 INJECTION, SOLUTION INTRA-ARTICULAR; INTRALESIONAL; INTRAMUSCULAR; INTRAVENOUS; SOFT TISSUE at 16:24

## 2018-01-10 RX ADMIN — PROPOFOL 50 MCG/KG/MIN: 10 INJECTION, EMULSION INTRAVENOUS at 16:38

## 2018-01-10 RX ADMIN — ONDANSETRON 4 MG: 2 INJECTION INTRAMUSCULAR; INTRAVENOUS at 12:34

## 2018-01-10 RX ADMIN — PHENYLEPHRINE HYDROCHLORIDE 100 MCG: 10 INJECTION, SOLUTION INTRAMUSCULAR; INTRAVENOUS; SUBCUTANEOUS at 16:35

## 2018-01-10 RX ADMIN — PHENYLEPHRINE HYDROCHLORIDE 200 MCG: 10 INJECTION, SOLUTION INTRAMUSCULAR; INTRAVENOUS; SUBCUTANEOUS at 16:42

## 2018-01-10 RX ADMIN — CEFOTETAN DISODIUM 2 G: 2 INJECTION, POWDER, FOR SOLUTION INTRAMUSCULAR; INTRAVENOUS at 15:39

## 2018-01-10 RX ADMIN — MIDAZOLAM 2 MG: 1 INJECTION INTRAMUSCULAR; INTRAVENOUS at 16:21

## 2018-01-10 RX ADMIN — HYDROMORPHONE HYDROCHLORIDE 0.5 MG: 1 INJECTION, SOLUTION INTRAMUSCULAR; INTRAVENOUS; SUBCUTANEOUS at 12:34

## 2018-01-10 RX ADMIN — Medication 0.5 MG: at 15:11

## 2018-01-10 RX ADMIN — HYDROCODONE BITARTRATE AND ACETAMINOPHEN 1 TABLET: 5; 325 TABLET ORAL at 17:36

## 2018-01-10 RX ADMIN — FENTANYL CITRATE 100 MCG: 50 INJECTION, SOLUTION INTRAMUSCULAR; INTRAVENOUS at 16:24

## 2018-01-10 RX ADMIN — SODIUM CHLORIDE, POTASSIUM CHLORIDE, SODIUM LACTATE AND CALCIUM CHLORIDE: 600; 310; 30; 20 INJECTION, SOLUTION INTRAVENOUS at 16:56

## 2018-01-10 RX ADMIN — GLYCOPYRROLATE 0.4 MG: 0.2 INJECTION, SOLUTION INTRAMUSCULAR; INTRAVENOUS at 16:55

## 2018-01-10 RX ADMIN — HYDROMORPHONE HYDROCHLORIDE 1 MG: 1 INJECTION, SOLUTION INTRAMUSCULAR; INTRAVENOUS; SUBCUTANEOUS at 16:39

## 2018-01-10 RX ADMIN — ROCURONIUM BROMIDE 30 MG: 10 INJECTION INTRAVENOUS at 16:24

## 2018-01-10 RX ADMIN — SODIUM CHLORIDE, POTASSIUM CHLORIDE, SODIUM LACTATE AND CALCIUM CHLORIDE: 600; 310; 30; 20 INJECTION, SOLUTION INTRAVENOUS at 16:13

## 2018-01-10 RX ADMIN — SODIUM CHLORIDE 51 ML: 9 INJECTION, SOLUTION INTRAVENOUS at 13:55

## 2018-01-10 RX ADMIN — PHENYLEPHRINE HYDROCHLORIDE 100 MCG: 10 INJECTION, SOLUTION INTRAMUSCULAR; INTRAVENOUS; SUBCUTANEOUS at 16:30

## 2018-01-10 RX ADMIN — PROPOFOL 200 MG: 10 INJECTION, EMULSION INTRAVENOUS at 16:24

## 2018-01-10 RX ADMIN — GLYCOPYRROLATE 0.2 MG: 0.2 INJECTION, SOLUTION INTRAMUSCULAR; INTRAVENOUS at 16:24

## 2018-01-10 RX ADMIN — Medication 0.5 MG: at 13:50

## 2018-01-10 RX ADMIN — ONDANSETRON 4 MG: 2 INJECTION INTRAMUSCULAR; INTRAVENOUS at 16:39

## 2018-01-10 RX ADMIN — LIDOCAINE HYDROCHLORIDE 30 MG: 10 INJECTION, SOLUTION INFILTRATION; PERINEURAL at 16:24

## 2018-01-10 ASSESSMENT — ENCOUNTER SYMPTOMS
DYSURIA: 0
VOMITING: 1
ABDOMINAL PAIN: 1
FEVER: 0
NAUSEA: 1
FREQUENCY: 0

## 2018-01-10 NOTE — H&P
[facility] - H&P     Mariah Mart MRN# [id number]   Age: 57 year old YOB: 1960     HPI:  The patient has been experiencing acute RLQ abdominal pain for the past 1 day associated with nausea and vomiting.  Negative for associated fever, chills and anorexia.     Similar pain in the past:    No  Diarrhea, now or in the past:   No    History is obtained from the patient.    Review Of Systems:  Respiratory: No shortness of breath, dyspnea on exertion, cough, or hemoptysis  Cardiovascular: negative  Gastrointestinal: as above  Genitourinary: negative  Hematologic: Denies a h/o bleeding or clotting disorders.  Denies a history of problems with anesthesia.  Remainder of 10 point review of systems negative.    PMH:  Past Medical History:   Diagnosis Date     Essential hypertension, benign      Hypopotassemia     on diuretics     Internal hemorrhoids     ligated     Mixed hyperlipidemia      Obesity 12/28/2010       PSH:  Past Surgical History:   Procedure Laterality Date     TONSILLECTOMY & ADENOIDECTOMY         Allergies:  Allergies   Allergen Reactions     Gabapentin      Pt feels out of it       Home Medications:  No current outpatient prescriptions on file.       Social History:  Social History   Substance Use Topics     Smoking status: Never Smoker     Smokeless tobacco: Never Used     Alcohol use Yes      Comment: 1 mixed drink per month       Family History:  No family history chronic diarrhea, inflammatory bowel disease or colon cancer.  No bleeding disorders, clotting disorders, or problems with anesthesia.    Physical Exam:  /80  Pulse 88  Temp 98.7  F (37.1  C) (Temporal)  Resp 16  LMP 05/30/2007  SpO2 99%  General appearance: Resting Comfortably in bed, no apparent distress  Neck: Supple without thyromegaly, lymphadenopathy, masses  Lungs: Clear to auscultation bilaterally  Heart: regular rate and rhythm, no murmurs, rubs, or gallops  Abdomen:  obese, normal bowel sounds    Tenderness: present: RLQ mild, negative rebound tenderness   Masses: none   Organomegaly: none  Extremities: Without clubbing, cyanosis, edema  Neurologic: Grossly intact times four extremities, alert and oriented times three  Psychiatric: Mood and affect are appropriate  Skin: Without lesions or rashes      Labs Reviewed:  Lab Results   Component Value Date    WBC 12.8 01/10/2018     Lab Results   Component Value Date    HGB 14.6 01/10/2018     Lab Results   Component Value Date     01/10/2018     Last Basic Metabolic Panel:  Lab Results   Component Value Date     01/10/2018      Lab Results   Component Value Date    POTASSIUM 4.2 01/10/2018     Lab Results   Component Value Date    CHLORIDE 100 01/10/2018     Lab Results   Component Value Date    CECILIA 9.2 01/10/2018     Lab Results   Component Value Date    CO2 26 01/10/2018     Lab Results   Component Value Date    BUN 8 01/10/2018     Lab Results   Component Value Date    CR 0.57 01/10/2018     Lab Results   Component Value Date     01/10/2018       Radiology:  All imaging studies reviewed by me.    Results for orders placed or performed during the hospital encounter of 01/10/18   CT Abdomen Pelvis w Contrast    Narrative    CT ABDOMEN AND PELVIS WITH CONTRAST  1/10/2018 2:01 PM     HISTORY: Right lower quadrant pain.    TECHNIQUE: Axial images from the lung bases to the symphysis are  performed with additional coronal reformatted images. 85 mL of Isovue  370 are given intravenously.  Radiation dose for this scan was reduced  using automated exposure control, adjustment of the mA and/or kV  according to patient size, or iterative reconstruction technique.     FINDINGS: The lung bases are clear.    Abdomen: Fatty infiltration of the liver. An indeterminate  low-attenuation liver lesion, possibly a cyst, right hepatic lobe  measures approximately 1 cm on series 2, image 15. The liver is  otherwise unremarkable.  Gallstones are noted in the gallbladder, which  is otherwise unremarkable. The spleen, pancreas, adrenal glands and  kidneys are unremarkable. No hydronephrosis. No obvious renal calculi.  Aorta demonstrates scattered calcified plaque. No aneurysm or  dissection. The bowel is normal in caliber without obstruction or  diverticulitis. Appendix is fluid-filled and distended measuring  nearly 9 mm in diameter. Surrounding mesenteric inflammation is  present, and multiple calcified appendicoliths are present with the  largest at the base of the appendix on series 3, image 85. Findings  are consistent with acute appendicitis. No abscess or free  intraperitoneal air.    Pelvis: The bladder, uterus, ovaries and rectum are unremarkable. No  enlarged pelvic lymph nodes or free fluid. Bone window examination is  within normal limits.      Impression    IMPRESSION:  1. Acute appendicitis. No associated abscess or free intraperitoneal  air.  2. Fatty infiltration of liver and probable right hepatic lobe cyst.  3. Cholelithiasis. No CT findings to suggest cholecystitis.    REJI GARCIA MD         ASSESSMENT/PLAN:  The patient's history, physical exam, laboratory and imaging studies are suspicious for acute appendicitis.  I have offered the patient a laparoscopic appendectomy.      We have had a detailed discussion of nature of appendicitis, the procedure, its risks, benefits, alternatives, recovery, postop limitations, anesthesia, bleeding, blood transfusion,  DVT, PE, postoperative infections, injury to adjacent organs and structures, open conversion, bowel resection, prolonged convalescence in the event of gangrene or perforation of the appendix, abdominal wall hernia, intraabdominal adhesions which can lead to bowel obstruction.  We have discussed interventions and treatment for these complications.  The patient understands the possibility of a diagnosis other than appendicitis.  All questions have been answered to the best of  my ability.        She elects to proceed.      Pre-operative antibiotics have been given.     This note was created using voice recognition software. Undetected word substitutions or other errors may have occurred.     Eugenia De La Rosa MD    Time spent with the patient, reviewing the EMR, reviewing laboratory and imaging studies, more than 50% of which was counseling and coordinating care:  25 minutes.

## 2018-01-10 NOTE — ED PROVIDER NOTES
History     Chief Complaint:  Abdominal Pain    HPI   Mariah Mart is a 57 year old female who presents to the emergency department today for evaluation of abdominal pain. The patient reports progressively worsening right lower abdominal pain with associated nausea around midnight. The pain is severe, constant, non-radiating and aggravated by any movement. She reports one episode of vomiting this morning as well as some soft stools for which she took Zofran. The patient denies any fevers, dysuria, urinary frequency, vaginal bleeding or discharge or history of abdominal surgeries. She denies any history of kidney stones or ovarian cysts.    Allergies:  Gabapentin      Medications:    amLODIPine (NORVASC) 5 MG tablet  lisinopril (PRINIVIL/ZESTRIL) 20 MG tablet  DULoxetine (CYMBALTA) 60 MG EC capsule  orphenadrine (NORFLEX) 100 MG 12 hr tablet  ALPRAZolam (XANAX) 0.25 MG tablet  naproxen (NAPROSYN) 500 MG tablet  simvastatin (ZOCOR) 40 MG tablet  ASPIRIN 81 MG OR TABS    Past Medical History:    Essential hypertension, benign   Hypopotassemia   Internal hemorrhoids   Mixed hyperlipidemia   Obesity     Past Surgical History:    Tonsillectomy & Adenoidectomy      Family History:    Heart Disease (CABG)  COPD  Hypertension  MI  Interstitial cystitis    Social History:  The patient was accompanied to the ED by her significant other.  Smoking Status: Never Smoker  Smokeless Tobacco: Never Used  Alcohol Use: Yes   Marital Status:  Single      Review of Systems   Constitutional: Negative for fever.   Gastrointestinal: Positive for abdominal pain, nausea and vomiting.   Genitourinary: Negative for dysuria, frequency, vaginal bleeding and vaginal discharge.   All other systems reviewed and are negative.    Physical Exam   First Vitals:  BP: (!) 159/96  Pulse: 88  Heart Rate: 88  Temp: 97.8  F (36.6  C)  Resp: 18  SpO2: 100 %    Physical Exam    Constitutional:  Pleasant, age appropriate in visible pain  Eyes:     Conjunctiva normal  Neck:    Supple, no meningismus.     CV:     Regular rate and rhythm.      No murmurs, rubs or gallops.     No lower extremity edema.  PULM:    Clear to auscultation bilateral.       No respiratory distress.      Good air exchange.  ABD:    Soft, non-distended.       Moderate-severe tenderness in the RLQ.      + Rovsing's sign     Bowel sounds normal.     No pulsatile masses.       No rebound, guarding or rigidity.     No CVA tenderness.      No hepatosplenomegaly.  MSK:     No gross deformity to all four extremities.   LYMPH:   No cervical lymphadenopathy.  NEURO:   Alert.  Good muscular tone, no atrophy.   Skin:    Warm, dry and intact.    Psych:    Mood is good and affect is appropriate.    Emergency Department Course     ECG:  Indication: Abdominal Pain  Completed at 1536.  Read at 1540.   Normal sinus rhythm  Nonspecific T wave abnormality  Abnormal ECG  Rate 97 bpm. LA interval 168. QRS duration 80. QT/QTc 344/436. P-R-T axes 52 39 68.     Imaging:  Radiology findings were communicated with the patient who voiced understanding of the findings.    CT Abdomen Pelvis w Contrast  IMPRESSION:  1. Acute appendicitis. No associated abscess or free intraperitoneal  air.  2. Fatty infiltration of liver and probable right hepatic lobe cyst.  3. Cholelithiasis. No CT findings to suggest cholecystitis.  Report per radiology      Laboratory:  Laboratory findings were communicated with the patient who voiced understanding of the findings.  CBC: WBC 12.8 (H) o/w WNL. (HGB 14.6, )   BMP: Glucose 135 (H) o/w WNL (Creatinine 0.57)  HCG Qualitative Blood: Negative      Interventions:  1234: Zofran 4mg IV  1234: Dilaudid 0.5mg IV  1350: Dilaudid 0.5mg IV  1511: Dilaudid 0.5mg IV  1539: Cefotan 2g IV  1550: Dilaudid 0.5mg IV     Emergency Department Course:  Nursing notes and vitals reviewed.  EKG obtained in the ED, see results above.    1207: I performed an exam of the patient as documented above.    IV was inserted and blood was drawn for laboratory testing, results above.  The patient was sent for a CT Abdomen Pelvis w Contrast while in the emergency department, results above.   1450: Patient rechecked and updated.    1504: I spoke with Dr. De La Rosa of the General Surgery service regarding patient's presentation, findings, and plan of care.   Findings and plan explained to the Patient who consents to admission. Discussed the patient with Dr. De La Rosa, who will admit the patient to the OR for further monitoring, evaluation, and treatment.   I personally reviewed the laboratory and imaging results with the Patient and answered all related questions prior to admit.     Impression & Plan      Medical Decision Making:    Mariah Mart is a 57 year old female who presents with abdominal pain and the CT scan confirms appendicitis.  There is no evidence of rupture or abscess at this time. Pain has been controlled with interventions in the Emergency Department.  Parenteral antibiotics have been ordered and given in the Emergency Department. The case was discussed with the on-call surgeon and the patient will be going to the operating room.  An inpatient bed has been arranged for after surgery.  Patient is hemodynamically stable in ED.  Questions were answered.        Diagnosis:    ICD-10-CM    1. Acute appendicitis with localized peritonitis K35.3      Disposition:  Admitted to the OR  Scribe Disclosure:  I, Patricia Bradley, am serving as a scribe at 12:10 PM on 1/10/2018 to document services personally performed by Jayden Hernadez MD based on my observations and the provider's statements to me.    1/10/2018   [facility] EMERGENCY DEPARTMENT       Jayden Hernadez MD  01/10/18 [id number]

## 2018-01-10 NOTE — OP NOTE
General Surgery Operative Note      Pre-operative diagnosis: acute appendicitis   Post-operative diagnosis: acute gangrenous appendicitis    Procedure: laparoscopic appendectomy   Surgeon: Eugenia De La Rosa MD   Assistant(s): NONE   Anesthesia: general    Estimated blood loss:  Complications: 3 cc  none   Specimens:   ID Type Source Tests Collected by Time Destination   1 : Peritoneal Fluid Culture  Fluid Peritoneum ANAEROBIC BACTERIAL CULTURE, GRAM STAIN, TISSUE CULTURE AEROBIC BACTERIAL Eugenia De La Rosa MD 1/10/2018  4:46 PM    A : Appendix Tissue Appendix SURGICAL PATHOLOGY EXAM Eugenia De La Rosa MD 1/10/2018  4:39 PM         DESCRIPTION OF PROCEDURE:  The patient was placed on the table in supine position.  General endotracheal anesthesia was induced and the abdomen was prepped and draped in standard sterile fashion.  An incision was made just above the umbilicus with a blade.  The incision was carried down to the fascia.  Fascia was incised with a blade.  The peritoneum was entered bluntly with a [place] clamp.  Two interrupted 0 Vicryl sutures were placed at the extremes of this fascial incision.  The Yg trocar was introduced and the abdomen was insufflated with CO2.  Two 5 mm trocars were placed in the infraumbilical midline, one two fingerbreadths above the pubic symphysis and one CHCF between the pubic symphysis and the umbilicus.  The patient was placed in Trendelenburg and right side up.  The appendix was identified in the right lower quadrant.  It appeared dilated and gangrenous.  Turbid yellow fluid was aspirated from the paracolic gutter and sent for culture.  The appendix was freed up from the mesoappendix at its base using a [place] dissector.  The base of the appendix was ligated and divided with the Endo-FERNANDA stapler.  The mesoappendix was ligated and divided with a reload of the Endo-FERNANDA stapler.  The appendix was passed into an Endocatch bag and removed through the umbilical  trocar site.  The right lower quadrant was inspected for hemostasis.  Hemostasis was assured.  We irrigated with copious amounts of sterile saline and aspirated the effluent.  The 2 infraumbilical trocars were removed under direct visualization.  There was no bleeding from either of these sites.  The Yg trocar was removed and the abdomen was evacuated of CO2.  An additional interrupted 0 Vicryl suture was placed in the umbilical trocar site fascia.  Each of the three 0 Vicryl sutures was cinched down and tied.  The skin of all 3 incisions was anesthetized with local anesthetic and closed with interrupted 4-0 Vicryl subcuticular sutures and Steri-Strips.  The patient tolerated the procedure well.  Sponge and instrument counts were correct.    Eugenia De La Rosa MD

## 2018-01-10 NOTE — DISCHARGE INSTRUCTIONS
HOME CARE FOLLOWING APPENDECTOMY  DJEA Villalobos E. Gavin, N. Guttormson, D. Maurer, NEMO Sol, BERNADETTE Cross    INCISIONAL CARE:  Replace the bandage over your incision (or incisions) until all drainage stops, or if more comfortable to have in place.  If present, leave the steri-strips (white paper tapes) in place for 14 days after surgery.  If you have staples in your incision at the time of discharge, they will be removed at your follow-up appointment.  If Dermabond (a type of skin glue) is present, leave in place until it wears/flakes off.     BATHING:  Avoid baths for 1 week after surgery.  Showers are okay.  You may wash your hair at any time.  Gently pat your incision dry after bathing.    ACTIVITY:  Light Activity -- you may immediately be up and about as tolerated.  Driving -- you may drive when comfortable and off narcotic pain medications.  Light Work -- resume when comfortable off pain medications.  (If you can drive, you probably can work.)  Strenuous Work/Activity -- limit lifting to 20 pounds for 1 week.  Progressively increase with time.  Active Sports (running, biking, etc.) -- cautiously resume after 2 weeks.    DISCOMFORT:  Use pain medications as prescribed by your surgeon.  Take the pain medication with some food, when possible, to minimize side effects.  Intermittent use of ice packs at the incision sites may help during the first 48 hours.  Expect gradual improvement.    DIET:  No restrictions.  Drink plenty of fluids.  While taking pain medications, increase dietary fiber or add a fiber supplementation like Metamucil or Citrucel to help prevent constipation - a possible side effect of pain medications.    NAUSEA:  If nauseated from the anesthetic/pain meds; rest in bed, get up cautiously with assistance, and drink clear liquids (juice, tea, broth).    RETURN APPOINTMENT:  Schedule a follow-up visit 2-3 weeks post-op.  Office Phone:  [phone]     CONTACT US IF THE FOLLOWING  DEVELOPS:   1. A fever that is above 101     2. If there is a large amount of drainage, bleeding, or swelling.   3. Severe pain that is not relieved by your prescription.   4. Drainage that is thick, cloudy, yellow, green or white.   5. Any other questions not answered by  Frequently Asked Questions  sheet.      FREQUENTLY ASKED QUESTIONS:    Q:  How should my incision look?    A:  Normally your incision will appear slightly swollen with light redness directly along the incision itself as it heals.  It may feel like a bump or ridge as the healing/scarring happens, and over time (3-4 months) this bump or ridge feeling should slowly go away.  In general, clear or pink watery drainage can be normal at first as your incision heals, but should decrease over time.    Q:  How do I know if my incision is infected?  A:  Look at your incision for signs of infection, like redness around the incision spreading to surrounding skin, or drainage of cloudy or foul-smelling drainage.  If you feel warm, check your temperature to see if you are running a fever.    **If any of these things occur, please notify the nurse at our office.  We may need you to come into the office for an incision check.      Q:  How do I take care of my incision?  A:  If you have a dressing in place - Starting the day after surgery, replace the dressing 1-2 times a day until there is no further drainage from the incision.  At that time, a dressing is no longer needed.  Try to minimize tape on the skin if irritation is occurring at the tape sites.  If you have significant irritation from tape on the skin, please call the office to discuss other method of dressing your incision.    Small pieces of tape called  steri-strips  may be present directly overlying your incision; these may be removed 10 days after surgery unless otherwise specified by your surgeon.  If these tapes start to loosen at the ends, you may trim them back until they fall off or are removed.     A:  If you had  Dermabond  tissue glue used as a dressing (this causes your incision to look shiny with a clear covering over it) - This type of dressing wears off with time and does not require more dressings over the top unless it is draining around the glue as it wears off.  Do not apply ointments or lotions over the incisions until the glue has completely worn off.    Q:  There is a piece of tape or a sticky  lead  still on my skin.  Can I remove this?  A:  Sometimes the sticky  leads  used for monitoring during surgery or for evaluation in the emergency department are not all removed while you are in the hospital.  These sometimes have a tab or metal dot on them.  You can easily remove these on your own, like taking off a band-aid.  If there is a gel substance under the  lead , simply wipe/clean it off with a washcloth or paper towel.      Q:  What can I do to minimize constipation (very hard stools, or lack of stools)?  A:  Stay well hydrated.  Increase your dietary fiber intake or take a fiber supplement -with plenty of water.  Walk around frequently.  You may consider an over-the-counter stool-softener.  Your Pharmacist can assist you with choosing one that is stocked at your pharmacy.  Constipation is also one of the most common side effects of pain medication.  If you are using pain medication, be pro-active and try to PREVENT problems with constipation by taking the steps above BEFORE constipation becomes a problem.    Q:  What do I do if I need more pain medications?  A:  Call the office to receive refills.  Be aware that certain pain meds cannot be called into a pharmacy and actually require a paper prescription.  A change may be made in your pain med as you progress thru your recovery period or if you have side effects to certain meds.    --Pain meds are NOT refilled after 5pm on weekdays, and NOT AT ALL on the weekends, so please look ahead to prevent problems.      Q:  Why am I having a hard time  sleeping now that I am at home?  A:  Many medications you receive while you are in the hospital can impact your sleep for a number of days after your surgery/hospitalization.  Decreased level of activity and naps during the day may also make sleeping at night difficult.  Try to minimize day-time naps, and get up frequently during the day to walk around your home during your recovery time.  Sleep aides may be of some help, but are not recommended for long-term use.      Q:  I am having some back discomfort.  What should I do?  A:  This may be related to certain positioning that was required for your surgery, extended periods of time in bed, or other changes in your overall activity level.  You may try ice, heat, acetaminophen, or ibuprofen to treat this temporarily.  Note that many pain medications have acetaminophen in them and would state this on the prescription bottle.  Be sure not to exceed the maximum of 4000mg per day of acetaminophen.     **If the pain you are having does not resolve, is severe, or is a flare of back pain you have had on other occasions prior to surgery, please contact your primary physician for further recommendations or for an appointment to be examined at their office.    Q:  Why am I having headaches?  A:  Headaches can be caused by many things:  caffeine withdrawal, use of pain meds, dehydration, high blood pressure, lack of sleep, over-activity/exhaustion, flare-up of usual migraine headaches.  If you feel this is related to muscle tension (a band-like feeling around the head, or a pressure at the low-back of the head) you may try ice or heat to this area.  You may need to drink more fluids (try electrolyte drink like Gatorade), rest, or take your usual migraine medications.   **If your headaches do not resolve, worsen, are accompanied by other symptoms, or if your blood pressure is high, please call your primary physician for recommendation and/or examination.    Q:  I am unable to  urinate.  What do I do?  A:  A small percentage of people can have difficulty urinating initially after surgery.  This includes being able to urinate only a very small amount at a time and feeling discomfort or pressure in the very low abdomen.  This is called  urinary retention , and is actually an urgent situation.  Proceed to your nearest Emergency department for evaluation (not an Urgent Care Center).  Sometimes the bladder does not work correctly after certain medications you receive during surgery, or related to certain procedures.  You may need to have a catheter placed until your bladder recovers.  When planning to go to an Emergency department, it may help to call the ER to let them know you are coming in for this problem after a surgery.  This may help you get in quicker to be evaluated.  **If you have symptoms of a urinary tract infection, please contact your primary physician for the proper evaluation and treatment.    If you have other questions, please call the office Monday thru Friday between 8am and 5pm to discuss with the nurse or physician assistant.  #[phone]    There is a surgeon ON CALL on weekday evenings and over the weekend in case of urgent need only, and may be contacted at the same number.    If you are having an emergency, call 911 or proceed to your nearest emergency department.    GENERAL ANESTHESIA OR SEDATION ADULT DISCHARGE INSTRUCTIONS   SPECIAL PRECAUTIONS FOR 24 HOURS AFTER SURGERY    IT IS NOT UNUSUAL TO FEEL LIGHT-HEADED OR FAINT, UP TO 24 HOURS AFTER SURGERY OR WHILE TAKING PAIN MEDICATION.  IF YOU HAVE THESE SYMPTOMS; SIT FOR A FEW MINUTES BEFORE STANDING AND HAVE SOMEONE ASSIST YOU WHEN YOU GET UP TO WALK OR USE THE BATHROOM.    YOU SHOULD REST AND RELAX FOR THE NEXT 24 HOURS AND YOU MUST MAKE ARRANGEMENTS TO HAVE SOMEONE STAY WITH YOU FOR AT LEAST 24 HOURS AFTER YOUR DISCHARGE.  AVOID HAZARDOUS AND STRENUOUS ACTIVITIES.  DO NOT MAKE IMPORTANT DECISIONS FOR 24  HOURS.    DO NOT DRIVE ANY VEHICLE OR OPERATE MECHANICAL EQUIPMENT FOR 24 HOURS FOLLOWING THE END OF YOUR SURGERY.  EVEN THOUGH YOU MAY FEEL NORMAL, YOUR REACTIONS MAY BE AFFECTED BY THE MEDICATION YOU HAVE RECEIVED.    DO NOT DRINK ALCOHOLIC BEVERAGES FOR 24 HOURS FOLLOWING YOUR SURGERY.    DRINK CLEAR LIQUIDS (APPLE JUICE, GINGER ALE, 7-UP, BROTH, ETC.).  PROGRESS TO YOUR REGULAR DIET AS YOU FEEL ABLE.    YOU MAY HAVE A DRY MOUTH, A SORE THROAT, MUSCLES ACHES OR TROUBLE SLEEPING.  THESE SHOULD GO AWAY AFTER 24 HOURS.    CALL YOUR DOCTOR FOR ANY OF THE FOLLOWING:  SIGNS OF INFECTION (FEVER, GROWING TENDERNESS AT THE SURGERY SITE, A LARGE AMOUNT OF DRAINAGE OR BLEEDING, SEVERE PAIN, FOUL-SMELLING DRAINAGE, REDNESS OR SWELLING.    IT HAS BEEN OVER 8 TO 10 HOURS SINCE SURGERY AND YOU ARE STILL NOT ABLE TO URINATE (PASS WATER).     1 tablet of NORCO given at 5:40pm

## 2018-01-10 NOTE — ANESTHESIA CARE TRANSFER NOTE
Patient: Mariah Mart    Procedure(s):  LAPAROSCOPIC APPENDECTOMY  - Wound Class: III-Contaminated    Diagnosis: APPENDIX  Diagnosis Additional Information: No value filed.    Anesthesia Type:   General, ETT     Note:  Airway :Face Mask  Patient transferred to:PACU  Comments: Katie Report: Identifed the Patient, Identified the Reponsible Provider, Reviewed the pertinent medical history, Discussed the surgical course, Reviewed Intra-OP anesthesia mangement and issues during anesthesia, Set expectations for post-procedure period and Allowed opportunity for questions and acknowledgement of understanding      Vitals: (Last set prior to Anesthesia Care Transfer)    CRNA VITALS  1/10/2018 1641 - 1/10/2018 1717      1/10/2018             Pulse: 91    SpO2: 100 %    Resp Rate (observed): 17                Electronically Signed By: LAUREN Byunm CRNA  January 10, 2018  5:17 PM

## 2018-01-10 NOTE — ED NOTES
Patient presents with complaints of sudden onset of RLQ pain which has been going on for 12 hours. Patient also states that she is having vomiting and nausea as well. Patient took 8 mg of Zofran PTA. ABC intact without need for intervention .

## 2018-01-10 NOTE — PHARMACY-ADMISSION MEDICATION HISTORY
Admission medication history interview status for this patient is complete. See [facility] admission navigator for allergy information, prior to admission medications and immunization status.     Medication history interview source(s):Patient and Family  Medication history resources (including written lists, pill bottles, clinic record):None  [facility]    Changes made to PTA medication list:  Added: prevacid  Deleted: -  Changed: lidocaine to prn    Actions taken by pharmacist (provider contacted, etc):None     Additional medication history information:None    Medication reconciliation/reorder completed by provider prior to medication history? No    Do you take OTC medications (eg tylenol, ibuprofen, fish oil, eye/ear drops, etc)? yes(Y/N)    For patients on insulin therapy: no (Y/N)  Lantus/levemir/NPH/Mix 70/30 dose:   (Y/N) (see Med list for doses)   Sliding scale Novolog Y/N  If Yes, do you have a baseline novolog pre-meal dose:  units with meals  Patients eat three meals a day:   Y/N    How many episodes of hypoglycemia do you have per week: _______  How many missed doses do you have per week: ______  How many times do you check your blood glucose per day: _______   Any Barriers to therapy - Be specific :  cost of medications, comfortable with giving injections (if applicable), comfortable and confident with current diabetes regimen: Y/N ______________      Prior to Admission medications    Medication Sig Last Dose Taking? Auth Provider   Calcium-Magnesium-Vitamin D 500-250-200 MG-MG-UNIT TABS Take 2 tablets by mouth At Bedtime  Yes Unknown, Entered By History   lidocaine (LIDODERM) 5 % Patch Place 2 patches onto the skin every 24 hours PRN to lower back and neck  Yes Unknown, Entered By History   LANsoprazole (PREVACID) 30 MG CR capsule Take 30 mg by mouth At Bedtime 1/9/2018 at Unknown time Yes Unknown, Entered By History   amLODIPine (NORVASC) 5 MG tablet Take 1 tablet (5 mg) by mouth daily 1/9/2018  at hs Yes Jennifer Dickerson MD   lisinopril (PRINIVIL/ZESTRIL) 20 MG tablet Take 1 tablet (20 mg) by mouth daily 1/9/2018 at hs Yes Jennifer Dickerson MD   DULoxetine (CYMBALTA) 60 MG EC capsule Take 1 capsule (60 mg) by mouth daily 1/9/2018 at hs Yes Jennifer Dickerson MD   orphenadrine (NORFLEX) 100 MG 12 hr tablet TAKE 1 TABLET BY MOUTH TWO TIMES DAILY  Patient taking differently: Take 100 mg by mouth At Bedtime TAKE 1 TABLET BY MOUTH TWO TIMES DAILY 1/9/2018 at hs Yes Quan Chowdhury MD   ALPRAZolam (XANAX) 0.25 MG tablet Take 1 tablet (0.25 mg) by mouth 3 times daily as needed for anxiety  Yes Jennifer Dickerson MD   naproxen (NAPROSYN) 500 MG tablet Take 1 tablet (500 mg) by mouth 2 times daily as needed for moderate pain  Yes Jennifer Dickerson MD   simvastatin (ZOCOR) 40 MG tablet Take 1 tablet (40 mg) by mouth At Bedtime 1/9/2018 at Unknown time Yes Jennifer Dickerson MD   Boswellia-Glucosamine-Vit D (GLUCOSAMINE COMPLEX PO) Take 2 tablets by mouth At Bedtime  1/9/2018 at Unknown time Yes Reported, Patient   MULTIVITAMIN TABS   OR  1/9/2018 at Unknown time Yes    ASPIRIN 81 MG OR TABS 1 tab po QD (Once per day) More than a month at Unknown time  Jennifer Dickerson MD

## 2018-01-10 NOTE — ANESTHESIA PREPROCEDURE EVALUATION
PAC NOTE:       ANESTHESIA PRE EVALUATION:  Anesthesia Evaluation     .             ROS/MED HX    ENT/Pulmonary:  - neg pulmonary ROS     Neurologic:  - neg neurologic ROS     Cardiovascular:     (+) Dyslipidemia, hypertension----. : . . . :. .       METS/Exercise Tolerance:     Hematologic:  - neg hematologic  ROS       Musculoskeletal:  - neg musculoskeletal ROS       GI/Hepatic:  - neg GI/hepatic ROS       Renal/Genitourinary:  - ROS Renal section negative       Endo:  - neg endo ROS       Psychiatric:  - neg psychiatric ROS       Infectious Disease:  - neg infectious disease ROS       Malignancy:         Other: Comment: .Lab Test        01/10/18     02/06/11                       1238          1028          WBC          12.8*        5.8           HGB          14.6         14.1          MCV          91           87            PLT          364          309            Lab Test        01/10/18     12/22/16     09/11/15                       1238          0957          1102          NA           134          141          138           POTASSIUM    4.2          4.0          3.9           CHLORIDE     100          104          103           CO2          26           28           26            BUN          8            8            7             CR           0.57         0.68         0.71          ANIONGAP     8            9            9             CECILIA          9.2          10.0         9.1           GLC          135*         82           85                                  Physical Exam  Normal systems: cardiovascular and pulmonary    Airway   Mallampati: II    Dental     Cardiovascular   Rhythm and rate: regular and normal      Pulmonary    breath sounds clear to auscultation             Anesthesia Plan      History & Physical Review  History and physical reviewed and following examination; no interval change.    ASA Status:  1 .        Plan for General and ETT with Intravenous induction. Maintenance will be  Inhalation and Balanced.    PONV prophylaxis:  Ondansetron (or other 5HT-3) and Dexamethasone or Solumedrol       Postoperative Care  Postoperative pain management:  IV analgesics, Oral pain medications and Multi-modal analgesia.      Consents  Anesthetic plan, risks, benefits and alternatives discussed with:  Patient or representative..                            .

## 2018-01-10 NOTE — IP AVS SNAPSHOT
[facility]    [address]    [facility] [address]    Phone:  [phone]    Fax:  [phone]                                       After Visit Summary   1/10/2018    Mariah Mart    MRN: [id number]           After Visit Summary Signature Page     I have received my discharge instructions, and my questions have been answered. I have discussed any challenges I see with this plan with the nurse or doctor.    ..........................................................................................................................................  Patient/Patient Representative Signature      ..........................................................................................................................................  Patient Representative Print Name and Relationship to Patient    ..................................................               ................................................  Date                                            Time    ..........................................................................................................................................  Reviewed by Signature/Title    ...................................................              ..............................................  Date                                                            Time

## 2018-01-10 NOTE — IP AVS SNAPSHOT
MRN:[id number]                      After Visit Summary   1/10/2018    Mariah Mart    MRN: [id number]           Thank you!     Thank you for choosing [facility] for your care. Our goal is always to provide you with excellent care. Hearing back from our patients is one way we can continue to improve our services. Please take a few minutes to complete the written survey that you may receive in the mail after you visit. If you would like to speak to someone directly about your visit please contact Patient Relations at [phone]. Thank you!          Patient Information     Date Of Birth          1960        About your hospital stay     You were admitted on:  N/A You last received care in the:  [facility]    You were discharged on:  January 10, 2018       Who to Call     For medical emergencies, please call 911.  For non-urgent questions about your medical care, please call your primary care provider or clinic, [phone]  For questions related to your surgery, please call your surgery clinic        Attending Provider     Provider Specialty    Jayden Hernadez MD Emergency Medicine       Primary Care Provider Office Phone # Fax #    Jennifer Dickerson [phone]      After Care Instructions     Ice to affected area       Ice to operative site PRN                  Further instructions from your care team       HOME CARE FOLLOWING APPENDECTOMY  DEJA Villalobos E. Gavin, N. Guttormson, D. Maurer, R. O Donnell, L. Thomas    INCISIONAL CARE:  Replace the bandage over your incision (or incisions) until all drainage stops, or if more comfortable to have in place.  If present, leave the steri-strips (white paper tapes) in place for 14 days after surgery.  If you have staples in your incision at the time of discharge, they will be removed at your follow-up appointment.  If Dermabond (a type of skin glue) is present, leave in  place until it wears/flakes off.     BATHING:  Avoid baths for 1 week after surgery.  Showers are okay.  You may wash your hair at any time.  Gently pat your incision dry after bathing.    ACTIVITY:  Light Activity -- you may immediately be up and about as tolerated.  Driving -- you may drive when comfortable and off narcotic pain medications.  Light Work -- resume when comfortable off pain medications.  (If you can drive, you probably can work.)  Strenuous Work/Activity -- limit lifting to 20 pounds for 1 week.  Progressively increase with time.  Active Sports (running, biking, etc.) -- cautiously resume after 2 weeks.    DISCOMFORT:  Use pain medications as prescribed by your surgeon.  Take the pain medication with some food, when possible, to minimize side effects.  Intermittent use of ice packs at the incision sites may help during the first 48 hours.  Expect gradual improvement.    DIET:  No restrictions.  Drink plenty of fluids.  While taking pain medications, increase dietary fiber or add a fiber supplementation like Metamucil or Citrucel to help prevent constipation - a possible side effect of pain medications.    NAUSEA:  If nauseated from the anesthetic/pain meds; rest in bed, get up cautiously with assistance, and drink clear liquids (juice, tea, broth).    RETURN APPOINTMENT:  Schedule a follow-up visit 2-3 weeks post-op.  Office Phone:  [phone]     CONTACT US IF THE FOLLOWING DEVELOPS:   1. A fever that is above 101     2. If there is a large amount of drainage, bleeding, or swelling.   3. Severe pain that is not relieved by your prescription.   4. Drainage that is thick, cloudy, yellow, green or white.   5. Any other questions not answered by  Frequently Asked Questions  sheet.      FREQUENTLY ASKED QUESTIONS:    Q:  How should my incision look?    A:  Normally your incision will appear slightly swollen with light redness directly along the incision itself as it heals.  It may feel like a bump or  ridge as the healing/scarring happens, and over time (3-4 months) this bump or ridge feeling should slowly go away.  In general, clear or pink watery drainage can be normal at first as your incision heals, but should decrease over time.    Q:  How do I know if my incision is infected?  A:  Look at your incision for signs of infection, like redness around the incision spreading to surrounding skin, or drainage of cloudy or foul-smelling drainage.  If you feel warm, check your temperature to see if you are running a fever.    **If any of these things occur, please notify the nurse at our office.  We may need you to come into the office for an incision check.      Q:  How do I take care of my incision?  A:  If you have a dressing in place - Starting the day after surgery, replace the dressing 1-2 times a day until there is no further drainage from the incision.  At that time, a dressing is no longer needed.  Try to minimize tape on the skin if irritation is occurring at the tape sites.  If you have significant irritation from tape on the skin, please call the office to discuss other method of dressing your incision.    Small pieces of tape called  steri-strips  may be present directly overlying your incision; these may be removed 10 days after surgery unless otherwise specified by your surgeon.  If these tapes start to loosen at the ends, you may trim them back until they fall off or are removed.    A:  If you had  Dermabond  tissue glue used as a dressing (this causes your incision to look shiny with a clear covering over it) - This type of dressing wears off with time and does not require more dressings over the top unless it is draining around the glue as it wears off.  Do not apply ointments or lotions over the incisions until the glue has completely worn off.    Q:  There is a piece of tape or a sticky  lead  still on my skin.  Can I remove this?  A:  Sometimes the sticky  leads  used for monitoring during surgery  or for evaluation in the emergency department are not all removed while you are in the hospital.  These sometimes have a tab or metal dot on them.  You can easily remove these on your own, like taking off a band-aid.  If there is a gel substance under the  lead , simply wipe/clean it off with a washcloth or paper towel.      Q:  What can I do to minimize constipation (very hard stools, or lack of stools)?  A:  Stay well hydrated.  Increase your dietary fiber intake or take a fiber supplement -with plenty of water.  Walk around frequently.  You may consider an over-the-counter stool-softener.  Your Pharmacist can assist you with choosing one that is stocked at your pharmacy.  Constipation is also one of the most common side effects of pain medication.  If you are using pain medication, be pro-active and try to PREVENT problems with constipation by taking the steps above BEFORE constipation becomes a problem.    Q:  What do I do if I need more pain medications?  A:  Call the office to receive refills.  Be aware that certain pain meds cannot be called into a pharmacy and actually require a paper prescription.  A change may be made in your pain med as you progress thru your recovery period or if you have side effects to certain meds.    --Pain meds are NOT refilled after 5pm on weekdays, and NOT AT ALL on the weekends, so please look ahead to prevent problems.      Q:  Why am I having a hard time sleeping now that I am at home?  A:  Many medications you receive while you are in the hospital can impact your sleep for a number of days after your surgery/hospitalization.  Decreased level of activity and naps during the day may also make sleeping at night difficult.  Try to minimize day-time naps, and get up frequently during the day to walk around your home during your recovery time.  Sleep aides may be of some help, but are not recommended for long-term use.      Q:  I am having some back discomfort.  What should I  do?  A:  This may be related to certain positioning that was required for your surgery, extended periods of time in bed, or other changes in your overall activity level.  You may try ice, heat, acetaminophen, or ibuprofen to treat this temporarily.  Note that many pain medications have acetaminophen in them and would state this on the prescription bottle.  Be sure not to exceed the maximum of 4000mg per day of acetaminophen.     **If the pain you are having does not resolve, is severe, or is a flare of back pain you have had on other occasions prior to surgery, please contact your primary physician for further recommendations or for an appointment to be examined at their office.    Q:  Why am I having headaches?  A:  Headaches can be caused by many things:  caffeine withdrawal, use of pain meds, dehydration, high blood pressure, lack of sleep, over-activity/exhaustion, flare-up of usual migraine headaches.  If you feel this is related to muscle tension (a band-like feeling around the head, or a pressure at the low-back of the head) you may try ice or heat to this area.  You may need to drink more fluids (try electrolyte drink like Gatorade), rest, or take your usual migraine medications.   **If your headaches do not resolve, worsen, are accompanied by other symptoms, or if your blood pressure is high, please call your primary physician for recommendation and/or examination.    Q:  I am unable to urinate.  What do I do?  A:  A small percentage of people can have difficulty urinating initially after surgery.  This includes being able to urinate only a very small amount at a time and feeling discomfort or pressure in the very low abdomen.  This is called  urinary retention , and is actually an urgent situation.  Proceed to your nearest Emergency department for evaluation (not an Urgent Care Center).  Sometimes the bladder does not work correctly after certain medications you receive during surgery, or related to  certain procedures.  You may need to have a catheter placed until your bladder recovers.  When planning to go to an Emergency department, it may help to call the ER to let them know you are coming in for this problem after a surgery.  This may help you get in quicker to be evaluated.  **If you have symptoms of a urinary tract infection, please contact your primary physician for the proper evaluation and treatment.    If you have other questions, please call the office Monday thru Friday between 8am and 5pm to discuss with the nurse or physician assistant.  #[phone]    There is a surgeon ON CALL on weekday evenings and over the weekend in case of urgent need only, and may be contacted at the same number.    If you are having an emergency, call 911 or proceed to your nearest emergency department.    GENERAL ANESTHESIA OR SEDATION ADULT DISCHARGE INSTRUCTIONS   SPECIAL PRECAUTIONS FOR 24 HOURS AFTER SURGERY    IT IS NOT UNUSUAL TO FEEL LIGHT-HEADED OR FAINT, UP TO 24 HOURS AFTER SURGERY OR WHILE TAKING PAIN MEDICATION.  IF YOU HAVE THESE SYMPTOMS; SIT FOR A FEW MINUTES BEFORE STANDING AND HAVE SOMEONE ASSIST YOU WHEN YOU GET UP TO WALK OR USE THE BATHROOM.    YOU SHOULD REST AND RELAX FOR THE NEXT 24 HOURS AND YOU MUST MAKE ARRANGEMENTS TO HAVE SOMEONE STAY WITH YOU FOR AT LEAST 24 HOURS AFTER YOUR DISCHARGE.  AVOID HAZARDOUS AND STRENUOUS ACTIVITIES.  DO NOT MAKE IMPORTANT DECISIONS FOR 24 HOURS.    DO NOT DRIVE ANY VEHICLE OR OPERATE MECHANICAL EQUIPMENT FOR 24 HOURS FOLLOWING THE END OF YOUR SURGERY.  EVEN THOUGH YOU MAY FEEL NORMAL, YOUR REACTIONS MAY BE AFFECTED BY THE MEDICATION YOU HAVE RECEIVED.    DO NOT DRINK ALCOHOLIC BEVERAGES FOR 24 HOURS FOLLOWING YOUR SURGERY.    DRINK CLEAR LIQUIDS (APPLE JUICE, GINGER ALE, 7-UP, BROTH, ETC.).  PROGRESS TO YOUR REGULAR DIET AS YOU FEEL ABLE.    YOU MAY HAVE A DRY MOUTH, A SORE THROAT, MUSCLES ACHES OR TROUBLE SLEEPING.  THESE SHOULD GO AWAY AFTER 24  "HOURS.    CALL YOUR DOCTOR FOR ANY OF THE FOLLOWING:  SIGNS OF INFECTION (FEVER, GROWING TENDERNESS AT THE SURGERY SITE, A LARGE AMOUNT OF DRAINAGE OR BLEEDING, SEVERE PAIN, FOUL-SMELLING DRAINAGE, REDNESS OR SWELLING.    IT HAS BEEN OVER 8 TO 10 HOURS SINCE SURGERY AND YOU ARE STILL NOT ABLE TO URINATE (PASS WATER).     1 tablet of NORCO given at 5:40pm          Pending Results     Date and Time Order Name Status Description    1/10/2018 1647 Tissue Culture Aerobic Bacterial In process     1/10/2018 1647 Gram stain In process     1/10/2018 1647 Anaerobic bacterial culture In process     1/10/2018 1646 Surgical pathology exam In process     1/10/2018 1530 EKG 12 lead Preliminary             Admission Information     Date & Time Department Dept. Phone    1/10/2018 [facility] [phone]      Your Vitals Were     Blood Pressure Pulse Temperature Respirations Last Period Pulse Oximetry    119/82 88 99  F (37.2  C) (Temporal) 18 2007 92%      MyChart Information     Se[employer] lets you send messages to your doctor, view your test results, renew your prescriptions, schedule appointments and more. To sign up, go to [email or web address] . Click on \"Log in\" on the left side of the screen, which will take you to the Welcome page. Then click on \"Sign up Now\" on the right side of the page.     You will be asked to enter the access code listed below, as well as some personal information. Please follow the directions to create your username and password.     Your access code is: [email or web address]  Expires: 4/10/2018  6:13 PM     Your access code will  in 90 days. If you need help or a new code, please call your [place] clinic or [phone].        Care EveryWhere ID     This is your Care EveryWhere ID. This could be used by other organizations to access your [place] medical records  [id number]        Equal Access to Services     MATTHIEU BLANCA AH: noemi Stewart " carminejoan, curt white, paulina mckeon ah. So [facility] [phone].    ATENCIÓN: Si lilibeth gorman, tiene a headley disposición servicios gratuitos de asistencia lingüística. Antolin al [phone].    We comply with applicable federal civil rights laws and [place] laws. We do not discriminate on the basis of race, color, national origin, age, disability, sex, sexual orientation, or gender identity.               Review of your medicines      START taking        Dose / Directions    amoxicillin-clavulanate 875-125 MG per tablet   Commonly known as:  AUGMENTIN   Used for:  Acute gangrenous appendicitis        Dose:  1 tablet   Take 1 tablet by mouth 2 times daily for 7 days   Quantity:  14 tablet   Refills:  0       diazepam 5 MG tablet   Commonly known as:  VALIUM   Used for:  Acute appendicitis with localized peritonitis        Dose:  5 mg   Take 1 tablet (5 mg) by mouth every 12 hours as needed for muscle spasms   Quantity:  3 tablet   Refills:  0       HYDROcodone-acetaminophen 5-325 MG per tablet   Commonly known as:  NORCO   Used for:  Acute appendicitis with localized peritonitis        Dose:  1-2 tablet   Take 1-2 tablets by mouth every 4 hours as needed for other (Moderate to Severe Pain)   Quantity:  20 tablet   Refills:  0       ondansetron 4 MG ODT tab   Commonly known as:  ZOFRAN-ODT   Used for:  Acute appendicitis with localized peritonitis        Dose:  4-8 mg   Take 1-2 tablets (4-8 mg) by mouth every 8 hours as needed for nausea Dissolve ON the tongue.   Quantity:  4 tablet   Refills:  0         CONTINUE these medicines which may have CHANGED, or have new prescriptions. If we are uncertain of the size of tablets/capsules you have at home, strength may be listed as something that might have changed.        Dose / Directions    MULTIVITAMIN TABS   OR   This may have changed:  See the new instructions.        Refills:  0       orphenadrine 100 MG 12 hr tablet   Commonly  known as:  NORFLEX   This may have changed:    - how much to take  - how to take this  - when to take this  - additional instructions   Used for:  Cervicalgia, Bilateral low back pain without sciatica, unspecified chronicity        TAKE 1 TABLET BY MOUTH TWO TIMES DAILY   Quantity:  180 tablet   Refills:  0         CONTINUE these medicines which have NOT CHANGED        Dose / Directions    ALPRAZolam 0.25 MG tablet   Commonly known as:  XANAX   Used for:  Panic disorder without agoraphobia        Dose:  0.25 mg   Take 1 tablet (0.25 mg) by mouth 3 times daily as needed for anxiety   Quantity:  30 tablet   Refills:  0       amLODIPine 5 MG tablet   Commonly known as:  NORVASC   Used for:  Essential hypertension, benign        Dose:  5 mg   Take 1 tablet (5 mg) by mouth daily   Quantity:  90 tablet   Refills:  0       aspirin 81 MG tablet   Used for:  Chest pain, unspecified        1 tab po QD (Once per day)   Quantity:  100   Refills:  5       Calcium-Magnesium-Vitamin D 500-250-200 MG-MG-UNIT Tabs        Dose:  2 tablet   Take 2 tablets by mouth At Bedtime   Refills:  0       DULoxetine 60 MG EC capsule   Commonly known as:  CYMBALTA   Used for:  Panic disorder without agoraphobia, Other headache syndrome        Dose:  60 mg   Take 1 capsule (60 mg) by mouth daily   Quantity:  90 capsule   Refills:  0       GLUCOSAMINE COMPLEX PO        Dose:  2 tablet   Take 2 tablets by mouth At Bedtime   Refills:  0       LANsoprazole 30 MG CR capsule   Commonly known as:  PREVACID        Dose:  30 mg   Take 30 mg by mouth At Bedtime   Refills:  0       lidocaine 5 % Patch   Commonly known as:  LIDODERM        Dose:  2 patch   Place 2 patches onto the skin every 24 hours PRN to lower back and neck   Refills:  0       lisinopril 20 MG tablet   Commonly known as:  PRINIVIL/ZESTRIL   Used for:  Essential hypertension, benign        Dose:  20 mg   Take 1 tablet (20 mg) by mouth daily   Quantity:  90 tablet   Refills:  0        naproxen 500 MG tablet   Commonly known as:  NAPROSYN   Used for:  Sprain of neck, sequela        Dose:  500 mg   Take 1 tablet (500 mg) by mouth 2 times daily as needed for moderate pain   Quantity:  60 tablet   Refills:  5       simvastatin 40 MG tablet   Commonly known as:  ZOCOR   Used for:  Hyperlipidemia LDL goal <160        Dose:  40 mg   Take 1 tablet (40 mg) by mouth At Bedtime   Quantity:  90 tablet   Refills:  3            Where to get your medicines      [place] [facility] [place] [address] [facility]  [address] [facility] [address]     Phone:  [phone]     amoxicillin-clavulanate 875-125 MG per tablet    ondansetron 4 MG ODT tab         Some of these will need a paper prescription and others can be bought over the counter. Ask your nurse if you have questions.     Bring a paper prescription for each of these medications     diazepam 5 MG tablet    HYDROcodone-acetaminophen 5-325 MG per tablet               ANTIBIOTIC INSTRUCTION     You've Been Prescribed an Antibiotic - Now What?  Your healthcare team thinks that you or your loved one might have an infection. Some infections can be treated with antibiotics, which are powerful, life-saving drugs. Like all medications, antibiotics have side effects and should only be used when necessary. There are some important things you should know about your antibiotic treatment.      Your healthcare team may run tests before you start taking an antibiotic.    Your team may take samples (e.g., from your blood, urine or other areas) to run tests to look for bacteria. These test can be important to determine if you need an antibiotic at all and, if you do, which antibiotic will work best.      Within a few days, your healthcare team might change or even stop your antibiotic.    Your team may start you on an antibiotic while they are working to find out what is making you sick.    Your team might  change your antibiotic because test results show that a different antibiotic would be better to treat your infection.    In some cases, once your team has more information, they learn that you do not need an antibiotic at all. They may find out that you don't have an infection, or that the antibiotic you're taking won't work against your infection. For example, an infection caused by a virus can't be treated with antibiotics. Staying on an antibiotic when you don't need it is more likely to be harmful than helpful.      You may experience side effects from your antibiotic.    Like all medications, antibiotics have side effects. Some of these can be serious.    Let you healthcare team know if you have any known allergies when you are admitted to the hospital.    One significant side effect of nearly all antibiotics is the risk of severe and sometimes deadly diarrhea caused by Clostridium difficile (C. Difficile). This occurs when a person takes antibiotics because some good germs are destroyed. Antibiotic use allows C. diificile to take over, putting patients at high risk for this serious infection.    As a patient or caregiver, it is important to understand your or your loved one's antibiotic treatment. It is especially important for caregivers to speak up when patients can't speak for themselves. Here are some important questions to ask your healthcare team.    What infection is this antibiotic treating and how do you know I have that infection?    What side effects might occur from this antibiotic?    How long will I need to take this antibiotic?    Is it safe to take this antibiotic with other medications or supplements (e.g., vitamins) that I am taking?     Are there any special directions I need to know about taking this antibiotic? For example, should I take it with food?    How will I be monitored to know whether my infection is responding to the antibiotic?    What tests may help to make sure the right  antibiotic is prescribed for me?      Information provided by:  www.cdc.gov/getsmart  U.S. Department of Health and Human Services  Centers for disease Control and Prevention  National Center for Emerging and Zoonotic Infectious Diseases  Division of Healthcare Quality Promotion         Protect others around you: Learn how to safely use, store and throw away your medicines at www.disposemymeds.org.             Medication List: This is a list of all your medications and when to take them. Check marks below indicate your daily home schedule. Keep this list as a reference.      Medications           Morning Afternoon Evening Bedtime As Needed    ALPRAZolam 0.25 MG tablet   Commonly known as:  XANAX   Take 1 tablet (0.25 mg) by mouth 3 times daily as needed for anxiety                                amLODIPine 5 MG tablet   Commonly known as:  NORVASC   Take 1 tablet (5 mg) by mouth daily                                amoxicillin-clavulanate 875-125 MG per tablet   Commonly known as:  AUGMENTIN   Take 1 tablet by mouth 2 times daily for 7 days                                aspirin 81 MG tablet   1 tab po QD (Once per day)                                Calcium-Magnesium-Vitamin D 500-250-200 MG-MG-UNIT Tabs   Take 2 tablets by mouth At Bedtime                                diazepam 5 MG tablet   Commonly known as:  VALIUM   Take 1 tablet (5 mg) by mouth every 12 hours as needed for muscle spasms                                DULoxetine 60 MG EC capsule   Commonly known as:  CYMBALTA   Take 1 capsule (60 mg) by mouth daily                                GLUCOSAMINE COMPLEX PO   Take 2 tablets by mouth At Bedtime                                HYDROcodone-acetaminophen 5-325 MG per tablet   Commonly known as:  NORCO   Take 1-2 tablets by mouth every 4 hours as needed for other (Moderate to Severe Pain)   Last time this was given:  1 tablet on 1/10/2018  5:36 PM                                LANsoprazole 30 MG CR  capsule   Commonly known as:  PREVACID   Take 30 mg by mouth At Bedtime                                lidocaine 5 % Patch   Commonly known as:  LIDODERM   Place 2 patches onto the skin every 24 hours PRN to lower back and neck                                lisinopril 20 MG tablet   Commonly known as:  PRINIVIL/ZESTRIL   Take 1 tablet (20 mg) by mouth daily                                MULTIVITAMIN TABS   OR                                naproxen 500 MG tablet   Commonly known as:  NAPROSYN   Take 1 tablet (500 mg) by mouth 2 times daily as needed for moderate pain                                ondansetron 4 MG ODT tab   Commonly known as:  ZOFRAN-ODT   Take 1-2 tablets (4-8 mg) by mouth every 8 hours as needed for nausea Dissolve ON the tongue.                                orphenadrine 100 MG 12 hr tablet   Commonly known as:  NORFLEX   TAKE 1 TABLET BY MOUTH TWO TIMES DAILY                                simvastatin 40 MG tablet   Commonly known as:  ZOCOR   Take 1 tablet (40 mg) by mouth At Bedtime

## 2018-01-11 LAB — INTERPRETATION ECG - MUSE: NORMAL

## 2018-01-12 LAB — COPATH REPORT: NORMAL

## 2018-01-15 LAB
BACTERIA SPEC CULT: NO GROWTH
SPECIMEN SOURCE: NORMAL

## 2018-01-17 LAB
BACTERIA SPEC CULT: NORMAL
Lab: NORMAL
SPECIMEN SOURCE: NORMAL

## 2018-01-19 DIAGNOSIS — F41.0 PANIC DISORDER WITHOUT AGORAPHOBIA: ICD-10-CM

## 2018-01-19 DIAGNOSIS — I10 ESSENTIAL HYPERTENSION, BENIGN: ICD-10-CM

## 2018-01-19 DIAGNOSIS — G44.89 OTHER HEADACHE SYNDROME: ICD-10-CM

## 2018-01-20 NOTE — TELEPHONE ENCOUNTER
"Requested Prescriptions   Pending Prescriptions Disp Refills     amLODIPine (NORVASC) 5 MG tablet [Pharmacy Med Name: AmLODIPine Besylate Oral Tablet 5 MG] 90 tablet 0    Last Written Prescription Date:  amLODIPine (NORVASC) 5 MG tablet  Last Fill Quantity: 90,  # refills: 0   Last Office Visit with [facility] or [facility] prescribing provider:  11/29/16   Future Office Visit:      Sig: Take 1 tablet (5 mg) by mouth daily    Calcium Channel Blockers Protocol  Failed    1/20/2018  9:53 AM       Failed - Blood pressure under 140/90    BP Readings from Last 3 Encounters:   01/10/18 145/84   11/29/16 132/78   09/11/15 138/80                Failed - Recent or future visit with authorizing provider    Patient had office visit in the last year or has a visit in the next 30 days with authorizing provider.  See \"Patient Info\" tab in inbasket, or \"Choose Columns\" in Meds & Orders section of the refill encounter.            Failed - No positive pregnancy test in past 12 months       Passed - Patient is age 18 or older       Passed - No active pregnancy on record       Passed - Normal serum creatinine on file in past 12 months    Recent Labs   Lab Test  01/10/18   1238   CR  0.57             lisinopril (PRINIVIL/ZESTRIL) 20 MG tablet [Pharmacy Med Name: Lisinopril Oral Tablet 20 MG] 90 tablet 0    Last Written Prescription Date:  10/23/17  Last Fill Quantity: 90,  # refills: 0   Last Office Visit with [facility] or [facility] prescribing provider:  11/29/16   Future Office Visit:      Sig: TAKE ONE TABLET BY MOUTH ONE TIME DAILY    ACE Inhibitors (Including Combos) Protocol Failed    1/20/2018  9:53 AM       Failed - Blood pressure under 140/90    BP Readings from Last 3 Encounters:   01/10/18 145/84   11/29/16 132/78   09/11/15 138/80                Failed - Recent or future visit with authorizing provider's specialty    Patient had office visit in the last year or has a visit in the next 30 days with authorizing provider.  See " "\"Patient Info\" tab in inbasket, or \"Choose Columns\" in Meds & Orders section of the refill encounter.            Failed - No positive pregnancy test in past 12 months       Passed - Patient is age 18 or older       Passed - No active pregnancy on record       Passed - Normal serum creatinine on file in past 12 months    Recent Labs   Lab Test  01/10/18   1238   CR  0.57            Passed - Normal serum potassium on file in past 12 months    Recent Labs   Lab Test  01/10/18   1238   POTASSIUM  4.2             DULoxetine (CYMBALTA) 60 MG EC capsule [Pharmacy Med Name: DULoxetine HCl Oral Capsule Delayed Release Particles 60 MG] 90 capsule 0    Last Written Prescription Date:  10/23/17  Last Fill Quantity: 90,  # refills: 0   Last Office Visit with [facility] prescribing provider:  11/29/16   Future Office Visit:      Sig: TAKE ONE CAPSULE BY MOUTH ONE TIME DAILY    Serotonin-Norepinephrine Reuptake Inhibitors  Failed    1/20/2018  9:53 AM       Failed - Blood pressure under 140/90    BP Readings from Last 3 Encounters:   01/10/18 145/84   11/29/16 132/78   09/11/15 138/80                Failed - Recent or future visit with authorizing provider's specialty    Patient had office visit in the last year or has a visit in the next 30 days with authorizing provider.  See \"Patient Info\" tab in inbasket, or \"Choose Columns\" in Meds & Orders section of the refill encounter.            Failed - No positive pregnancy test in past 12 months       Passed - Patient is age 18 or older       Passed - No active pregnancy on record          "

## 2018-01-23 NOTE — ANESTHESIA POSTPROCEDURE EVALUATION
Patient: Mariah Mart    Procedure(s):  LAPAROSCOPIC APPENDECTOMY  - Wound Class: IV-Dirty or Infected    Diagnosis:APPENDIX  Diagnosis Additional Information: No value filed.    Anesthesia Type:  General, ETT    Note:  Anesthesia Post Evaluation    Patient location during evaluation: PACU  Patient participation: Able to fully participate in evaluation  Level of consciousness: awake  Pain management: adequate  Airway patency: patent  Cardiovascular status: acceptable  Respiratory status: acceptable  Hydration status: acceptable  PONV: controlled     Anesthetic complications: None    Comments: Late entry RO        Last vitals:  Vitals:    01/10/18 1730 01/10/18 1745 01/10/18 1814   BP: 131/81 119/82 145/84   Pulse:      Resp: 20 18 16   Temp:  99  F (37.2  C) 99.1  F (37.3  C)   SpO2: 93% 92% 94%         Electronically Signed By: Srinivas King DO  January 22, 2018  10:10 PM

## 2018-01-24 DIAGNOSIS — E78.5 HYPERLIPIDEMIA LDL GOAL <160: ICD-10-CM

## 2018-01-24 DIAGNOSIS — F41.0 PANIC DISORDER WITHOUT AGORAPHOBIA: ICD-10-CM

## 2018-01-24 DIAGNOSIS — S13.9XXS SPRAIN OF NECK, SEQUELA: ICD-10-CM

## 2018-01-24 RX ORDER — AMLODIPINE BESYLATE 5 MG/1
TABLET ORAL
Qty: 30 TABLET | Refills: 0 | Status: SHIPPED | OUTPATIENT
Start: 2018-01-24 | End: 2018-02-02

## 2018-01-24 RX ORDER — DULOXETIN HYDROCHLORIDE 60 MG/1
CAPSULE, DELAYED RELEASE ORAL
Qty: 30 CAPSULE | Refills: 0 | Status: SHIPPED | OUTPATIENT
Start: 2018-01-24 | End: 2018-02-02

## 2018-01-24 RX ORDER — LISINOPRIL 20 MG/1
TABLET ORAL
Qty: 30 TABLET | Refills: 0 | Status: SHIPPED | OUTPATIENT
Start: 2018-01-24 | End: 2018-02-02

## 2018-01-24 NOTE — TELEPHONE ENCOUNTER
"Requested Prescriptions   Pending Prescriptions Disp Refills     naproxen (NAPROSYN) 500 MG tablet    Last Written Prescription Date:  11/29/2016  Last Fill Quantity: 60,  # refills: 5   Last Office Visit with [facility] or [facility] prescribing provider:  11/29/2016   Future Office Visit:    Next 5 appointments (look out 90 days)     Feb 02, 2018  2:50 PM CST   PHYSICAL with Jennifer Dickerson MD   [facility]    [address] [place] [address]  [address]  [facility] [address] [phone]                  60 tablet 5     Sig: Take 1 tablet (500 mg) by mouth 2 times daily as needed for moderate pain    NSAID Medications Failed    1/24/2018  9:33 AM       Failed - Blood pressure under 140/90    BP Readings from Last 3 Encounters:   01/10/18 145/84   11/29/16 132/78   09/11/15 138/80                Failed - Normal ALT on file in past 12 months    No lab results found.         Failed - Normal AST on file in past 12 months    No lab results found.         Failed - Normal CBC on file in past 12 months    Recent Labs   Lab Test  01/10/18   1238   WBC  12.8*   RBC  4.71   HGB  14.6   HCT  43.0   PLT  364            Failed - No positive pregnancy test in past 12 months       Passed - Recent or future visit with authorizing provider's specialty    Patient had office visit in the last year or has a visit in the next 30 days with authorizing provider.  See \"Patient Info\" tab in inbasket, or \"Choose Columns\" in Meds & Orders section of the refill encounter.            Passed - Patient is age 6-64 years       Passed - No active pregnancy on record       Passed - Normal serum creatinine on file in past 12 months    Recent Labs   Lab Test  01/10/18   1238   CR  0.57             simvastatin (ZOCOR) 40 MG tablet    Last Written Prescription Date:  11/29/2016  Last Fill Quantity: 90,  # refills: 3   Last Office Visit with [facility], [facility] or [facility] prescribing provider:  11/29/2016   Future Office Visit:  " "  Next 5 appointments (look out 90 days)     Feb 02, 2018  2:50 PM CST   PHYSICAL with Jennifer Dickerson MD   [facility]    [address]  [address]  [place] [address] [phone]                  90 tablet 3     Sig: Take 1 tablet (40 mg) by mouth At Bedtime    Statins Protocol Failed    1/24/2018  9:33 AM       Failed - LDL on file in past 12 months    Recent Labs   Lab Test  12/22/16   0957   LDL  98            Failed - No positive pregnancy test in past 12 months       Passed - No abnormal creatine kinase in past 12 months    No lab results found.         Passed - Recent or future visit with authorizing provider    Patient had office visit in the last year or has a visit in the next 30 days with authorizing provider.  See \"Patient Info\" tab in inbasket, or \"Choose Columns\" in Meds & Orders section of the refill encounter.            Passed - Patient is age 18 or older       Passed - No active pregnancy on record            "

## 2018-01-24 NOTE — TELEPHONE ENCOUNTER
ALPRAZolam (XANAX) 0.25 MG tablet  Last Written Prescription Date:  03/14/2017  Last Fill Quantity: 30,   # refills:  0  Last Office Visit: 11/29/2016  Future Office visit:    Next 5 appointments (look out 90 days)     Feb 02, 2018  2:50 PM CST   [facility]    [address] [place] [address] [phone]                   Routing refill request to provider for review/approval because:  Drug not on the [facility], [facility] or  Health refill protocol or controlled substance

## 2018-01-24 NOTE — TELEPHONE ENCOUNTER
BP Readings from Last 6 Encounters:   01/10/18 145/84   11/29/16 132/78   09/11/15 138/80   07/31/15 158/90   06/02/15 152/90   05/15/15 130/74    refilled until appointment

## 2018-01-24 NOTE — TELEPHONE ENCOUNTER
Routing refill request to provider for review/approval because:  Patient needs to be seen because it has been more than 1 year since last office visit.    OV scheduled. Please advise of refill request.    Sarah WIGGINS RN, BSN, PHN  [facility]

## 2018-01-25 ENCOUNTER — OFFICE VISIT (OUTPATIENT)
Dept: SURGERY | Facility: CLINIC | Age: 58
End: 2018-01-25
Payer: COMMERCIAL

## 2018-01-25 VITALS
OXYGEN SATURATION: 100 % | HEIGHT: 63 IN | SYSTOLIC BLOOD PRESSURE: 120 MMHG | DIASTOLIC BLOOD PRESSURE: 70 MMHG | WEIGHT: 170 LBS | HEART RATE: 83 BPM | BODY MASS INDEX: 30.12 KG/M2 | RESPIRATION RATE: 16 BRPM

## 2018-01-25 DIAGNOSIS — Z09 SURGICAL FOLLOWUP VISIT: Primary | ICD-10-CM

## 2018-01-25 PROCEDURE — 99024 POSTOP FOLLOW-UP VISIT: CPT | Performed by: SURGERY

## 2018-01-25 NOTE — MR AVS SNAPSHOT
"              After Visit Summary   1/25/2018    Mariah Mart    MRN: [id number]           Patient Information     Date Of Birth          1960        Visit Information        Provider Department      1/25/2018 9:00 AM Eugenia De La Rosa MD [facility] General Surgery      Today's Diagnoses     Surgical followup visit    -  1       Follow-ups after your visit        Your next 10 appointments already scheduled     Feb 02, 2018  2:50 PM CST   PHYSICAL with Jennifer Dickerson MD   [facility]    [address] [place] [address]  [address]  [facility] [address] [phone]              Who to contact     If you have questions or need follow up information about today's clinic visit or your schedule please contact [facility] directly at [phone].  Normal or non-critical lab and imaging results will be communicated to you by [employer], letter or phone within 4 business days after the clinic has received the results. If you do not hear from us within 7 days, please contact the clinic through MyChart or phone. If you have a critical or abnormal lab result, we will notify you by phone as soon as possible.  Submit refill requests through [employer] or call your pharmacy and they will forward the refill request to us. Please allow 3 business days for your refill to be completed.          Additional Information About Your Visit        MyChart Information     [employer] lets you send messages to your doctor, view your test results, renew your prescriptions, schedule appointments and more. To sign up, go to [email or web address] . Click on \"Log in\" on the left side of the screen, which will take you to the Welcome page. Then click on \"Sign up Now\" on the right side of the page.     You will be asked to enter the access code listed below, as well as some personal information. Please follow the directions to create your " "username and password.     Your access code is: [email or web address]  Expires: 4/10/2018  6:13 PM     Your access code will  in 90 days. If you need help or a new code, please call your [facility] clinic or [phone].        Care EveryWhere ID     This is your Care EveryWhere ID. This could be used by other organizations to access your [facility] medical records  [id number]        Your Vitals Were     Pulse Respirations Height Last Period Pulse Oximetry BMI (Body Mass Index)    83 16 5' 2.5\" (1.588 m) 2007 100% 30.6 kg/m2       Blood Pressure from Last 3 Encounters:   18 120/70   01/10/18 145/84   16 132/78    Weight from Last 3 Encounters:   18 170 lb (77.1 kg)   16 170 lb 3 oz (77.2 kg)   09/11/15 163 lb 3.2 oz (74 kg)              Today, you had the following     No orders found for display         Today's Medication Changes          These changes are accurate as of 18 10:00 AM.  If you have any questions, ask your nurse or doctor.               These medicines have changed or have updated prescriptions.        Dose/Directions    MULTIVITAMIN TABS   OR   This may have changed:  See the new instructions.        Refills:  0       orphenadrine 100 MG 12 hr tablet   Commonly known as:  NORFLEX   This may have changed:    - how much to take  - how to take this  - when to take this  - additional instructions   Used for:  Cervicalgia, Bilateral low back pain without sciatica, unspecified chronicity        TAKE 1 TABLET BY MOUTH TWO TIMES DAILY   Quantity:  180 tablet   Refills:  0                Primary Care Provider Office Phone # Fax #    Jennifer Dickerson [phone] [address] [place] [address]        Equal Access to Services     AMY BLANCA : Ana Neff, watamikoda santa, qaybta kaalmada christopher, paulina olivo. So [facility] [phone].    ATENCIÓN: Si habla español, tiene a headley disposición servicios " ambrosio de asistencia lingüística. Antolin harrington [phone].    We comply with applicable federal civil rights laws and [place] laws. We do not discriminate on the basis of race, color, national origin, age, disability, sex, sexual orientation, or gender identity.            Thank you!     Thank you for choosing [facility]  for your care. Our goal is always to provide you with excellent care. Hearing back from our patients is one way we can continue to improve our services. Please take a few minutes to complete the written survey that you may receive in the mail after your visit with us. Thank you!             Your Updated Medication List - Protect others around you: Learn how to safely use, store and throw away your medicines at www.disposemymeds.org.          This list is accurate as of 1/25/18 10:00 AM.  Always use your most recent med list.                   Brand Name Dispense Instructions for use Diagnosis    ALPRAZolam 0.25 MG tablet    XANAX    30 tablet    Take 1 tablet (0.25 mg) by mouth 3 times daily as needed for anxiety    Panic disorder without agoraphobia       amLODIPine 5 MG tablet    NORVASC    30 tablet    Take 1 tablet (5 mg) by mouth daily    Essential hypertension, benign       aspirin 81 MG tablet     100    1 tab po QD (Once per day)    Chest pain, unspecified       Calcium-Magnesium-Vitamin D 500-250-200 MG-MG-UNIT Tabs      Take 2 tablets by mouth At Bedtime        diazepam 5 MG tablet    VALIUM    3 tablet    Take 1 tablet (5 mg) by mouth every 12 hours as needed for muscle spasms    Acute appendicitis with localized peritonitis       DULoxetine 60 MG EC capsule    CYMBALTA    30 capsule    TAKE ONE CAPSULE BY MOUTH ONE TIME DAILY    Panic disorder without agoraphobia, Other headache syndrome       GLUCOSAMINE COMPLEX PO      Take 2 tablets by mouth At Bedtime        HYDROcodone-acetaminophen 5-325 MG per tablet    NORCO    20 tablet    Take 1-2 tablets by mouth every  4 hours as needed for other (Moderate to Severe Pain)    Acute appendicitis with localized peritonitis       LANsoprazole 30 MG CR capsule    PREVACID     Take 30 mg by mouth At Bedtime        lidocaine 5 % Patch    LIDODERM     Place 2 patches onto the skin every 24 hours PRN to lower back and neck        lisinopril 20 MG tablet    PRINIVIL/ZESTRIL    30 tablet    TAKE ONE TABLET BY MOUTH ONE TIME DAILY    Essential hypertension, benign       MULTIVITAMIN TABS   OR           naproxen 500 MG tablet    NAPROSYN    60 tablet    Take 1 tablet (500 mg) by mouth 2 times daily as needed for moderate pain    Sprain of neck, sequela       ondansetron 4 MG ODT tab    ZOFRAN-ODT    4 tablet    Take 1-2 tablets (4-8 mg) by mouth every 8 hours as needed for nausea Dissolve ON the tongue.    Acute appendicitis with localized peritonitis       orphenadrine 100 MG 12 hr tablet    NORFLEX    180 tablet    TAKE 1 TABLET BY MOUTH TWO TIMES DAILY    Cervicalgia, Bilateral low back pain without sciatica, unspecified chronicity       simvastatin 40 MG tablet    ZOCOR    90 tablet    Take 1 tablet (40 mg) by mouth At Bedtime    Hyperlipidemia LDL goal <160

## 2018-01-25 NOTE — PROGRESS NOTES
Subjective:  Mariah is here for her first postoperative visit. She underwent laparoscopic appendectomy. Today she  tells me she is doing quite well. She currently has minimal pain, she is eating a normal diet and her bowel movements are normal.     Objective:  Abd - soft, non-tender, non-distended, +BS  Inc(s) - c/d/i, healing well, no erythema, +normal healing ridge, no masses    Plan:  Activity limitations reviewed.  Ok to resume PT.  RTC PRN    Eugenia De La Rosa MD      Please route or send letter to:  Primary Care Provider (PCP) and Referring Provider

## 2018-01-25 NOTE — LETTER
2018    Re: Mariah Mart, : 1960    Subjective:  Mariah is here for her first postoperative visit. She underwent laparoscopic appendectomy. Today she tells me she is doing quite well. She currently has minimal pain, she is eating a normal diet and her bowel movements are normal.      Objective:  Abd - soft, non-tender, non-distended, +BS  Inc(s) - c/d/i, healing well, no erythema, +normal healing ridge, no masses     Plan:  Activity limitations reviewed.  Ok to resume PT.  RTC PRN     Eugenia De La Rosa MD

## 2018-01-25 NOTE — LETTER
[address] , [address]  [place] [address]  [phone]  F: [phone]    [email or web address]  [email or web address]  [email or web address]             2018      RE: Mariah Mart  :  1960      This is to certify that Mariah Mart has been under my care for surgery on January 10, 2018.      Patient is able to return to physical therapy without restrictions as of 18.              Sincerely,            Eugenia De La Rosa MD

## 2018-01-26 RX ORDER — NAPROXEN 500 MG/1
500 TABLET ORAL 2 TIMES DAILY PRN
Qty: 60 TABLET | Refills: 0 | Status: ON HOLD | OUTPATIENT
Start: 2018-01-26 | End: 2018-06-29

## 2018-01-26 RX ORDER — ALPRAZOLAM 0.25 MG
0.25 TABLET ORAL 3 TIMES DAILY PRN
Qty: 30 TABLET | Refills: 0 | Status: SHIPPED | OUTPATIENT
Start: 2018-01-26 | End: 2018-07-02

## 2018-01-26 RX ORDER — SIMVASTATIN 40 MG
40 TABLET ORAL AT BEDTIME
Qty: 90 TABLET | Refills: 0 | Status: SHIPPED | OUTPATIENT
Start: 2018-01-26 | End: 2018-02-02

## 2018-02-02 ENCOUNTER — OFFICE VISIT (OUTPATIENT)
Dept: PEDIATRICS | Facility: CLINIC | Age: 58
End: 2018-02-02
Payer: COMMERCIAL

## 2018-02-02 VITALS
DIASTOLIC BLOOD PRESSURE: 82 MMHG | HEART RATE: 81 BPM | HEIGHT: 63 IN | OXYGEN SATURATION: 99 % | TEMPERATURE: 98 F | WEIGHT: 169.9 LBS | SYSTOLIC BLOOD PRESSURE: 140 MMHG | BODY MASS INDEX: 30.11 KG/M2

## 2018-02-02 DIAGNOSIS — Z00.01 ENCOUNTER FOR ROUTINE ADULT PHYSICAL EXAM WITH ABNORMAL FINDINGS: Primary | ICD-10-CM

## 2018-02-02 DIAGNOSIS — E78.5 HYPERLIPIDEMIA LDL GOAL <160: ICD-10-CM

## 2018-02-02 DIAGNOSIS — M54.50 BILATERAL LOW BACK PAIN WITHOUT SCIATICA, UNSPECIFIED CHRONICITY: ICD-10-CM

## 2018-02-02 DIAGNOSIS — G44.89 OTHER HEADACHE SYNDROME: ICD-10-CM

## 2018-02-02 DIAGNOSIS — F41.0 PANIC DISORDER WITHOUT AGORAPHOBIA: ICD-10-CM

## 2018-02-02 DIAGNOSIS — M54.2 CERVICALGIA: ICD-10-CM

## 2018-02-02 DIAGNOSIS — I10 ESSENTIAL HYPERTENSION, BENIGN: ICD-10-CM

## 2018-02-02 PROCEDURE — 99214 OFFICE O/P EST MOD 30 MIN: CPT | Mod: 25 | Performed by: INTERNAL MEDICINE

## 2018-02-02 PROCEDURE — 99396 PREV VISIT EST AGE 40-64: CPT | Performed by: INTERNAL MEDICINE

## 2018-02-02 RX ORDER — ORPHENADRINE CITRATE 100 MG/1
100 TABLET, EXTENDED RELEASE ORAL AT BEDTIME
Qty: 90 TABLET | Refills: 3 | Status: SHIPPED | OUTPATIENT
Start: 2018-02-02 | End: 2018-05-23

## 2018-02-02 RX ORDER — BUSPIRONE HYDROCHLORIDE 10 MG/1
10 TABLET ORAL 2 TIMES DAILY
Qty: 60 TABLET | Refills: 1 | Status: SHIPPED | OUTPATIENT
Start: 2018-02-02 | End: 2018-05-22

## 2018-02-02 RX ORDER — BUSPIRONE HYDROCHLORIDE 5 MG/1
TABLET ORAL
Qty: 120 TABLET | Refills: 0 | Status: SHIPPED | OUTPATIENT
Start: 2018-02-02 | End: 2018-05-23

## 2018-02-02 RX ORDER — LISINOPRIL 40 MG/1
40 TABLET ORAL DAILY
Qty: 30 TABLET | Refills: 1 | Status: SHIPPED | OUTPATIENT
Start: 2018-02-02 | End: 2018-05-06

## 2018-02-02 RX ORDER — AMLODIPINE BESYLATE 5 MG/1
TABLET ORAL
Qty: 90 TABLET | Refills: 3 | Status: SHIPPED | OUTPATIENT
Start: 2018-02-02 | End: 2019-02-28

## 2018-02-02 RX ORDER — DULOXETIN HYDROCHLORIDE 60 MG/1
60 CAPSULE, DELAYED RELEASE ORAL DAILY
Qty: 90 CAPSULE | Refills: 3 | Status: SHIPPED | OUTPATIENT
Start: 2018-02-02 | End: 2019-02-28

## 2018-02-02 RX ORDER — SIMVASTATIN 40 MG
40 TABLET ORAL AT BEDTIME
Qty: 90 TABLET | Refills: 0 | Status: SHIPPED | OUTPATIENT
Start: 2018-02-02 | End: 2018-06-08

## 2018-02-02 ASSESSMENT — ANXIETY QUESTIONNAIRES
1. FEELING NERVOUS, ANXIOUS, OR ON EDGE: MORE THAN HALF THE DAYS
GAD7 TOTAL SCORE: 14
6. BECOMING EASILY ANNOYED OR IRRITABLE: NEARLY EVERY DAY
7. FEELING AFRAID AS IF SOMETHING AWFUL MIGHT HAPPEN: NOT AT ALL
2. NOT BEING ABLE TO STOP OR CONTROL WORRYING: MORE THAN HALF THE DAYS
5. BEING SO RESTLESS THAT IT IS HARD TO SIT STILL: NEARLY EVERY DAY
3. WORRYING TOO MUCH ABOUT DIFFERENT THINGS: MORE THAN HALF THE DAYS

## 2018-02-02 ASSESSMENT — PATIENT HEALTH QUESTIONNAIRE - PHQ9: 5. POOR APPETITE OR OVEREATING: MORE THAN HALF THE DAYS

## 2018-02-02 NOTE — MR AVS SNAPSHOT
After Visit Summary   2/2/2018    Mariah Mart    MRN: [id number]           Patient Information     Date Of Birth          1960        Visit Information        Provider Department      2/2/2018 2:50 PM Jennifer Dickerson MD [facility]        Today's Diagnoses     Encounter for routine adult physical exam with abnormal findings    -  1    Essential hypertension, benign        Panic disorder without agoraphobia        Other headache syndrome        Cervicalgia        Bilateral low back pain without sciatica, unspecified chronicity        Hyperlipidemia LDL goal <160          Care Instructions      Preventive Health Recommendations  Female Ages 50 - 64    Yearly exam: See your health care provider every year in order to  o Review health changes.   o Discuss preventive care.    o Review your medicines if your doctor has prescribed any.        If you get Pap tests with HPV test, you only need to test every 5 years, unless you have an abnormal result. You are due in 2020      Have a mammogram every 1 to 2 years.  You are due in December    Have a colonoscopy in 2022    Have a cholesterol test every year    Have a diabetes test (fasting glucose) every year      Shots: Get a flu shot each year. Get a tetanus shot every 10 years.  You are due in 2026    Nutrition:     Eat at least 5 servings of fruits and vegetables each day.    Eat whole-grain bread, whole-wheat pasta and brown rice instead of white grains and rice.    Talk to your provider about Calcium and Vitamin D.     Lifestyle    Exercise at least 150 minutes a week (30 minutes a day, 5 days a week). This will help you control your weight and prevent disease.    Limit alcohol to one drink per day.    No smoking.     Wear sunscreen to prevent skin cancer.     See your dentist every six months for an exam and cleaning.    See your eye doctor every 1 to 2 years.    Please do a nurse-only BP check or walk into the pharmacy and they will do  "it or have the pharmacy check it and send it to me.  Do a lab-only in 2 weeks as well - fasting.  You can schedule on [employer] or by calling us at [phone].     Start the buspar and let me know if you are not getting better in 4-6 weeks and we can bump up the dose again.  If controlling the anxiety does not help your attention, let me know and I can refer you to a psychologist for ADD evaluation.          Follow-ups after your visit        Follow-up notes from your care team     Return in about 1 year (around 2/2/2019) for Physical Exam.      Future tests that were ordered for you today     Open Future Orders        Priority Expected Expires Ordered    Lipid panel reflex to direct LDL Fasting Routine 2/2/2018 2/2/2019 2/2/2018    **Basic metabolic panel FUTURE 14d Routine 2/9/2018 2/16/2018 2/2/2018            Who to contact     If you have questions or need follow up information about today's clinic visit or your schedule please contact [facility] directly at [phone].  Normal or non-critical lab and imaging results will be communicated to you by [employer], letter or phone within 4 business days after the clinic has received the results. If you do not hear from us within 7 days, please contact the clinic through [employer] or phone. If you have a critical or abnormal lab result, we will notify you by phone as soon as possible.  Submit refill requests through [employer] or call your pharmacy and they will forward the refill request to us. Please allow 3 business days for your refill to be completed.          Additional Information About Your Visit        [employer] Information     [employer] lets you send messages to your doctor, view your test results, renew your prescriptions, schedule appointments and more. To sign up, go to [email or web address] . Click on \"Log in\" on the left side of the screen, which will take you to the Welcome page. Then click on \"Sign up Now\" on the right side of the page.     You " "will be asked to enter the access code listed below, as well as some personal information. Please follow the directions to create your username and password.     Your access code is: [email or web address]  Expires: 4/10/2018  6:13 PM     Your access code will  in 90 days. If you need help or a new code, please call your [facility] clinic or [phone].        Care EveryWhere ID     This is your Care EveryWhere ID. This could be used by other organizations to access your [facility] medical records  [id number]        Your Vitals Were     Pulse Temperature Height Last Period Pulse Oximetry BMI (Body Mass Index)    81 98  F (36.7  C) (Oral) 5' 2.5\" (1.588 m) 2007 99% 30.58 kg/m2       Blood Pressure from Last 3 Encounters:   18 140/82   18 120/70   01/10/18 145/84    Weight from Last 3 Encounters:   18 169 lb 14.4 oz (77.1 kg)   18 170 lb (77.1 kg)   16 170 lb 3 oz (77.2 kg)                 Today's Medication Changes          These changes are accurate as of 18  3:46 PM.  If you have any questions, ask your nurse or doctor.               Start taking these medicines.        Dose/Directions    * busPIRone 5 MG tablet   Commonly known as:  BUSPAR   Used for:  Panic disorder without agoraphobia   Started by:  Jennifer Dickerson MD        Start at 5 mg at bedtime for 3d, then twice daily for 3 days, then 7.5 mg (1.5 tabs) twice daily for 3 days, then 10 mg (2 tabs) twice daily   Quantity:  120 tablet   Refills:  0       * busPIRone 10 MG tablet   Commonly known as:  BUSPAR   Used for:  Panic disorder without agoraphobia   Started by:  Jennifer Dickerson MD        Dose:  10 mg   Take 1 tablet (10 mg) by mouth 2 times daily   Quantity:  60 tablet   Refills:  1       * Notice:  This list has 2 medication(s) that are the same as other medications prescribed for you. Read the directions carefully, and ask your doctor or other care provider to review them with you.      These medicines have changed " or have updated prescriptions.        Dose/Directions    amLODIPine 5 MG tablet   Commonly known as:  NORVASC   This may have changed:  See the new instructions.   Used for:  Essential hypertension, benign   Changed by:  Jennifer Dickerson MD        Take 1 tablet (5 mg) by mouth daily   Quantity:  90 tablet   Refills:  3       DULoxetine 60 MG EC capsule   Commonly known as:  CYMBALTA   This may have changed:  See the new instructions.   Used for:  Panic disorder without agoraphobia, Other headache syndrome   Changed by:  Jennifer Dickerson MD        Dose:  60 mg   Take 1 capsule (60 mg) by mouth daily   Quantity:  90 capsule   Refills:  3       lisinopril 40 MG tablet   Commonly known as:  PRINIVIL/ZESTRIL   This may have changed:  See the new instructions.   Used for:  Essential hypertension, benign   Changed by:  Jennifer Dickerson MD        Dose:  40 mg   Take 1 tablet (40 mg) by mouth daily   Quantity:  30 tablet   Refills:  1       MULTIVITAMIN TABS   OR   This may have changed:  See the new instructions.        Refills:  0       orphenadrine 100 MG 12 hr tablet   Commonly known as:  NORFLEX   This may have changed:    - how much to take  - how to take this  - when to take this   Used for:  Cervicalgia, Bilateral low back pain without sciatica, unspecified chronicity   Changed by:  Jennifer Dickerson MD        Dose:  100 mg   Take 1 tablet (100 mg) by mouth At Bedtime TAKE 1 TABLET BY MOUTH TWO TIMES DAILY   Quantity:  90 tablet   Refills:  3            Where to get your medicines      These medications were sent to [facility] [place] [address]     Phone:  [phone]     amLODIPine 5 MG tablet    busPIRone 10 MG tablet    busPIRone 5 MG tablet    DULoxetine 60 MG EC capsule    lisinopril 40 MG tablet    orphenadrine 100 MG 12 hr tablet    simvastatin 40 MG tablet                Primary Care Provider Office Phone # Fax #    Jennifer Dickerson [phone]  [phone]       [address] [place] [address]        Equal Access to Services     [facility] : Hadii ruba triplett hadbarryshobha Sobelgica, waaxda luqadaha, qaybta kaalmada vijayalma, paulina jay tysonkathryn linaressunny jamisonfaridagenevieve olivo. So [facility] [phone].    ATENCIÓN: Si habla español, tiene a headley disposición servicios gratuitos de asistencia lingüística. Antolin al [phone].    We comply with applicable federal civil rights laws and [place] laws. We do not discriminate on the basis of race, color, national origin, age, disability, sex, sexual orientation, or gender identity.            Thank you!     Thank you for choosing [facility]  for your care. Our goal is always to provide you with excellent care. Hearing back from our patients is one way we can continue to improve our services. Please take a few minutes to complete the written survey that you may receive in the mail after your visit with us. Thank you!             Your Updated Medication List - Protect others around you: Learn how to safely use, store and throw away your medicines at www.disposemymeds.org.          This list is accurate as of 2/2/18  3:46 PM.  Always use your most recent med list.                   Brand Name Dispense Instructions for use Diagnosis    ALPRAZolam 0.25 MG tablet    XANAX    30 tablet    Take 1 tablet (0.25 mg) by mouth 3 times daily as needed for anxiety    Panic disorder without agoraphobia       amLODIPine 5 MG tablet    NORVASC    90 tablet    Take 1 tablet (5 mg) by mouth daily    Essential hypertension, benign       aspirin 81 MG tablet     100    1 tab po QD (Once per day)    Chest pain, unspecified       * busPIRone 5 MG tablet    BUSPAR    120 tablet    Start at 5 mg at bedtime for 3d, then twice daily for 3 days, then 7.5 mg (1.5 tabs) twice daily for 3 days, then 10 mg (2 tabs) twice daily    Panic disorder without agoraphobia       * busPIRone 10 MG tablet    BUSPAR    60 tablet    Take 1 tablet (10  mg) by mouth 2 times daily    Panic disorder without agoraphobia       Calcium-Magnesium-Vitamin D 500-250-200 MG-MG-UNIT Tabs      Take 2 tablets by mouth At Bedtime        DULoxetine 60 MG EC capsule    CYMBALTA    90 capsule    Take 1 capsule (60 mg) by mouth daily    Panic disorder without agoraphobia, Other headache syndrome       GLUCOSAMINE COMPLEX PO      Take 2 tablets by mouth At Bedtime        LANsoprazole 30 MG CR capsule    PREVACID     Take 30 mg by mouth At Bedtime        lisinopril 40 MG tablet    PRINIVIL/ZESTRIL    30 tablet    Take 1 tablet (40 mg) by mouth daily    Essential hypertension, benign       MULTIVITAMIN TABS   OR           naproxen 500 MG tablet    NAPROSYN    60 tablet    Take 1 tablet (500 mg) by mouth 2 times daily as needed for moderate pain    Sprain of neck, sequela       orphenadrine 100 MG 12 hr tablet    NORFLEX    90 tablet    Take 1 tablet (100 mg) by mouth At Bedtime TAKE 1 TABLET BY MOUTH TWO TIMES DAILY    Cervicalgia, Bilateral low back pain without sciatica, unspecified chronicity       simvastatin 40 MG tablet    ZOCOR    90 tablet    Take 1 tablet (40 mg) by mouth At Bedtime    Hyperlipidemia LDL goal <160       * Notice:  This list has 2 medication(s) that are the same as other medications prescribed for you. Read the directions carefully, and ask your doctor or other care provider to review them with you.
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
## 2018-02-02 NOTE — PATIENT INSTRUCTIONS
Preventive Health Recommendations  Female Ages 50 - 64    Yearly exam: See your health care provider every year in order to  o Review health changes.   o Discuss preventive care.    o Review your medicines if your doctor has prescribed any.        If you get Pap tests with HPV test, you only need to test every 5 years, unless you have an abnormal result. You are due in 2020      Have a mammogram every 1 to 2 years.  You are due in December    Have a colonoscopy in 2022    Have a cholesterol test every year    Have a diabetes test (fasting glucose) every year      Shots: Get a flu shot each year. Get a tetanus shot every 10 years.  You are due in 2026    Nutrition:     Eat at least 5 servings of fruits and vegetables each day.    Eat whole-grain bread, whole-wheat pasta and brown rice instead of white grains and rice.    Talk to your provider about Calcium and Vitamin D.     Lifestyle    Exercise at least 150 minutes a week (30 minutes a day, 5 days a week). This will help you control your weight and prevent disease.    Limit alcohol to one drink per day.    No smoking.     Wear sunscreen to prevent skin cancer.     See your dentist every six months for an exam and cleaning.    See your eye doctor every 1 to 2 years.    Please do a nurse-only BP check or walk into the pharmacy and they will do it or have the pharmacy check it and send it to me.  Do a lab-only in 2 weeks as well - fasting.  You can schedule on [employer] or by calling us at [phone].     Start the buspar and let me know if you are not getting better in 4-6 weeks and we can bump up the dose again.  If controlling the anxiety does not help your attention, let me know and I can refer you to a psychologist for ADD evaluation.

## 2018-02-03 ASSESSMENT — PATIENT HEALTH QUESTIONNAIRE - PHQ9: SUM OF ALL RESPONSES TO PHQ QUESTIONS 1-9: 8

## 2018-02-03 ASSESSMENT — ANXIETY QUESTIONNAIRES: GAD7 TOTAL SCORE: 14

## 2018-05-22 DIAGNOSIS — F41.0 PANIC DISORDER WITHOUT AGORAPHOBIA: ICD-10-CM

## 2018-05-22 NOTE — TELEPHONE ENCOUNTER
"Requested Prescriptions   Pending Prescriptions Disp Refills     busPIRone (BUSPAR) 10 MG tablet [Pharmacy Med Name: BusPIRone HCl Oral Tablet 10 MG]    Last Written Prescription Date:  2/2/2018  Last Fill Quantity: 60,  # refills: 1   Last office visit: 2/2/2018 with prescribing provider:  Jennifer Dickerson     Future Office Visit:     60 tablet 0     Sig: TAKE ONE TABLET BY MOUTH TWICE DAILY    Atypical Antidepressants Protocol Passed    5/22/2018  8:50 AM       Passed - Recent (12 mo) or future (30 days) visit within the authorizing provider's specialty    Patient had office visit in the last 12 months or has a visit in the next 30 days with authorizing provider or within the authorizing provider's specialty.  See \"Patient Info\" tab in inbasket, or \"Choose Columns\" in Meds & Orders section of the refill encounter.           Passed - Patient is age 18 or older       Passed - No active pregnancy on record       Passed - No positive pregnancy test in past 12 mos          "

## 2018-05-23 ENCOUNTER — APPOINTMENT (OUTPATIENT)
Dept: GENERAL RADIOLOGY | Facility: CLINIC | Age: 58
End: 2018-05-23
Attending: EMERGENCY MEDICINE
Payer: COMMERCIAL

## 2018-05-23 ENCOUNTER — HOSPITAL ENCOUNTER (EMERGENCY)
Facility: CLINIC | Age: 58
Discharge: HOME | End: 2018-05-23
Attending: EMERGENCY MEDICINE | Admitting: EMERGENCY MEDICINE
Payer: COMMERCIAL

## 2018-05-23 VITALS
HEIGHT: 63 IN | OXYGEN SATURATION: 96 % | BODY MASS INDEX: 28.88 KG/M2 | DIASTOLIC BLOOD PRESSURE: 93 MMHG | HEART RATE: 90 BPM | TEMPERATURE: 99.1 F | WEIGHT: 163 LBS | RESPIRATION RATE: 22 BRPM | SYSTOLIC BLOOD PRESSURE: 150 MMHG
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
## 2018-05-23 DIAGNOSIS — S52.022A OLECRANON FRACTURE, LEFT, CLOSED, INITIAL ENCOUNTER: ICD-10-CM

## 2018-05-23 DIAGNOSIS — S53.105A ELBOW DISLOCATION, LEFT, INITIAL ENCOUNTER: Primary | ICD-10-CM

## 2018-05-23 PROCEDURE — 73060 X-RAY EXAM OF HUMERUS: CPT | Mod: LT

## 2018-05-23 PROCEDURE — 96374 THER/PROPH/DIAG INJ IV PUSH: CPT

## 2018-05-23 PROCEDURE — 40000275 ZZH STATISTIC RCP TIME EA 10 MIN

## 2018-05-23 PROCEDURE — 96361 HYDRATE IV INFUSION ADD-ON: CPT

## 2018-05-23 PROCEDURE — 99152 MOD SED SAME PHYS/QHP 5/>YRS: CPT

## 2018-05-23 PROCEDURE — 96376 TX/PRO/DX INJ SAME DRUG ADON: CPT

## 2018-05-23 PROCEDURE — 40000986 XR ELBOW PORT LT 2VW

## 2018-05-23 PROCEDURE — 73070 X-RAY EXAM OF ELBOW: CPT | Mod: LT

## 2018-05-23 PROCEDURE — 73090 X-RAY EXAM OF FOREARM: CPT | Mod: LT

## 2018-05-23 PROCEDURE — 25000128 H RX IP 250 OP 636: Performed by: EMERGENCY MEDICINE

## 2018-05-23 PROCEDURE — 99285 EMERGENCY DEPT VISIT HI MDM: CPT | Mod: 25

## 2018-05-23 PROCEDURE — 24600 TX CLSD ELBOW DISLC W/O ANES: CPT | Mod: LT

## 2018-05-23 RX ORDER — PROPOFOL 10 MG/ML
0.1 INJECTION, EMULSION INTRAVENOUS
Status: DISCONTINUED | OUTPATIENT
Start: 2018-05-23 | End: 2018-05-24 | Stop reason: HOSPADM

## 2018-05-23 RX ORDER — NALOXONE HYDROCHLORIDE 0.4 MG/ML
.1-.4 INJECTION, SOLUTION INTRAMUSCULAR; INTRAVENOUS; SUBCUTANEOUS
Status: DISCONTINUED | OUTPATIENT
Start: 2018-05-23 | End: 2018-05-24 | Stop reason: HOSPADM

## 2018-05-23 RX ORDER — BUSPIRONE HYDROCHLORIDE 10 MG/1
TABLET ORAL
Qty: 60 TABLET | Refills: 8 | Status: SHIPPED | OUTPATIENT
Start: 2018-05-23 | End: 2019-02-13

## 2018-05-23 RX ORDER — PROPOFOL 10 MG/ML
.5-1 INJECTION, EMULSION INTRAVENOUS ONCE
Status: COMPLETED | OUTPATIENT
Start: 2018-05-23 | End: 2018-05-23

## 2018-05-23 RX ORDER — FLUMAZENIL 0.1 MG/ML
0.2 INJECTION, SOLUTION INTRAVENOUS
Status: DISCONTINUED | OUTPATIENT
Start: 2018-05-23 | End: 2018-05-24 | Stop reason: HOSPADM

## 2018-05-23 RX ORDER — OXYCODONE AND ACETAMINOPHEN 5; 325 MG/1; MG/1
1-2 TABLET ORAL EVERY 6 HOURS PRN
Qty: 12 TABLET | Refills: 0 | Status: ON HOLD | OUTPATIENT
Start: 2018-05-23 | End: 2018-06-29

## 2018-05-23 RX ORDER — HYDROMORPHONE HYDROCHLORIDE 1 MG/ML
0.5 INJECTION, SOLUTION INTRAMUSCULAR; INTRAVENOUS; SUBCUTANEOUS
Status: COMPLETED | OUTPATIENT
Start: 2018-05-23 | End: 2018-05-23

## 2018-05-23 RX ADMIN — HYDROMORPHONE HYDROCHLORIDE 0.5 MG: 1 INJECTION, SOLUTION INTRAMUSCULAR; INTRAVENOUS; SUBCUTANEOUS at 19:51

## 2018-05-23 RX ADMIN — SODIUM CHLORIDE 1000 ML: 9 INJECTION, SOLUTION INTRAVENOUS at 19:52

## 2018-05-23 RX ADMIN — HYDROMORPHONE HYDROCHLORIDE 0.5 MG: 1 INJECTION, SOLUTION INTRAMUSCULAR; INTRAVENOUS; SUBCUTANEOUS at 20:07

## 2018-05-23 RX ADMIN — PROPOFOL 90 MG: 10 INJECTION, EMULSION INTRAVENOUS at 21:15

## 2018-05-23 RX ADMIN — HYDROMORPHONE HYDROCHLORIDE 0.5 MG: 1 INJECTION, SOLUTION INTRAMUSCULAR; INTRAVENOUS; SUBCUTANEOUS at 20:38

## 2018-05-23 ASSESSMENT — ENCOUNTER SYMPTOMS: NUMBNESS: 1

## 2018-05-23 NOTE — ED AVS SNAPSHOT
[facility]    [address]    [facility] [address]    Phone:  [phone]    Fax:  [phone]                                       Mariah Mart   MRN: [id number]    Department:  [facility]   Date of Visit:  5/23/2018           After Visit Summary Signature Page     I have received my discharge instructions, and my questions have been answered. I have discussed any challenges I see with this plan with the nurse or doctor.    ..........................................................................................................................................  Patient/Patient Representative Signature      ..........................................................................................................................................  Patient Representative Print Name and Relationship to Patient    ..................................................               ................................................  Date                                            Time    ..........................................................................................................................................  Reviewed by Signature/Title    ...................................................              ..............................................  Date                                                            Time

## 2018-05-23 NOTE — ED AVS SNAPSHOT
[facility] Emergency Department    [address] [facility] [address]    Phone:  [phone]    Fax:  [phone]                                       Mariah Mart   MRN: [id number]    Department:  [facility] Emergency Department   Date of Visit:  5/23/2018           Patient Information     Date Of Birth          1960        Your diagnoses for this visit were:     Elbow dislocation, left, initial encounter     Olecranon fracture, left, closed, initial encounter     Fall, initial encounter        You were seen by Demian Hernandez MD.      Follow-up Information     Follow up with Sadi Barger MD. Schedule an appointment as soon as possible for a visit in 1 week.    Specialty:  Orthopaedic Surgery    Why:  For follow up [facility] elbow fracture and dislocation    Contact information:    [facility]  [address] [facility] [address]  [phone]          Follow up with [facility] Emergency Department.    Specialty:  EMERGENCY MEDICINE    Why:  If symptoms worsen    Contact information:    [address] [facility] [address] [phone]        Discharge Instructions           Elbow Fracture    You have a break (fracture) of one or more bones of your elbow joint. This may be a small crack in the bone. Or it may be a major break, with the broken parts pushed out of position.  This fracture usually takes 4 to 12 weeks to heal, depending on the type. The first step in treatment is with a splint or cast. Severe fractures may need surgery to put the bone fragments back into place.  Home care  Follow these guidelines when caring for yourself at home:    Keep your arm elevated to reduce pain and swelling. When sitting or lying down keep your arm above the level of your heart. You can do this by placing your arm on a pillow that rests on your chest or on a pillow at your side. This is most important during the first 2 days (48 hours) after  the injury.    Put an ice pack on the injured area. Do this for 20 minutes every 1 to 2 hours the first day. You can make an ice pack by wrapping a plastic bag of ice cubes in a thin towel. As the ice melts, be careful that the cast or splint doesn t get wet. You can place the ice pack inside the sling and directly over the splint or cast. Continue to use the ice pack 3 to 4 times a day for the next 2 days. Then use the ice pack as needed to ease pain and swelling.    Keep the splint or cast completely dry at all times. Bathe with your splint or cast out of the water. Protect it with a large plastic bag, rubber-banded at the top end. If a fiberglass splint or cast gets wet, you can dry it with a hair dryer.    You may use acetaminophen or ibuprofen to control pain, unless another pain medicine was prescribed. If you have chronic liver or kidney disease, talk with your healthcare provider before using these medicines. Also talk with your provider if you ve had a stomach ulcer or gastrointestinal bleeding.    Don t put creams or objects under the cast if you have itching.  Follow-up care  Follow up with your healthcare provider in 1 week, or as advised. This is to make sure the bone is healing the way it should. If a splint was put on, it may be changed to a cast during your follow-up visit.  X-rays may be taken. You will be told of any new findings that may affect your care.  When to seek medical advice  Call your healthcare provider right away if any of these occur:    The cast or splint cracks    The plaster cast or splint becomes wet or soft    The fiberglass cast or splint stays wet for more than 24 hours    Tightness or pain under the cast or splint gets worse    Bad odor from the cast or wound fluid stains the cast    Fingers become swollen, cold, blue, numb, or tingly    You can t move your fingers    Skin around cast becomes red    Fever of 100.4 F (38 C) or higher, or as directed by your healthcare  provider   Date Last Reviewed: 2/6/2017 2000-2017 [employer]. [address] [facility], [place] [address]. All rights reserved. This information is not intended as a substitute for professional medical care. Always follow your healthcare professional's instructions.        Elbow Dislocation  An elbow dislocation may occur after a fall onto an outstretched arm or in a car accident. When the hand hits a hard surface, the force is sent to the elbow. This tears ligaments and forces the bones out of the joint. Usually no bones are broken. But the nearby nerves and blood vessels can be damaged.     Once the joint is put back in place, it will take about 6 weeks for the ligaments to heal. For simple dislocations, you will use a splint or sling for the first few weeks. You will need range-of-motion exercises or physical therapy early in your recovery. This will prevent the elbow joint from getting stiff. Later, you may need strengthening exercises.  In more severe cases, you may need surgery to realign the joint and repair the torn ligaments or broken bones. Most elbow dislocations heal fully. But there is some risk for arthritis or loss of full range of motion in that joint.  Home care  Follow these guidelines when caring for yourself at home:    Keep your arm elevated to reduce pain and swelling. When sitting or lying down keep your arm above the level of your heart. You can do this by placing your arm on a pillow that rests on your chest or on a pillow at your side. This is most important during the first 2 days (48 hours) after the injury.    Put an ice pack on the injured area. Do this for 20 minutes every 1 to 2 hours the first day. You can make an ice pack by wrapping a plastic bag of ice cubes in a thin towel. As the ice melts, be careful that the cast or splint doesn t get wet. You can put the ice pack inside the sling and directly over the splint or cast. Continue using the ice pack 3 to 4 times a  day for the next 2 days. Then use the ice pack as needed to ease pain and swelling.    Keep the splint or cast completely dry at all times. Bathe with your splint or cast out of the water. Protect it with a large plastic bag, rubber-banded at the top end. If a fiberglass splint or cast gets wet, you can dry it with a hair dryer on the cool setting.    You may use acetaminophen or ibuprofen to control pain, unless another pain medicine was prescribed. If you have chronic liver or kidney disease, talk with your healthcare provider before using these medicines. Also talk with your provider if you ve had a stomach ulcer or gastrointestinal bleeding.    Don t take part in sports or physical education until your healthcare provider says it s OK to do so.  Follow-up care  Follow up with your healthcare provider in 1 week, or as advised. Ask your provider when to start range-of-motion exercises. These will keep the elbow from getting stiff.  If X-rays were taken, a radiologist may look at them. You will be told of any new findings that may affect your care.  When to seek medical advice  Call your healthcare provider right away if any of these occur:    The plaster splint or cast becomes wet or soft    The fiberglass splint or cast stays wet for more than 24 hours    Tightness or pain in the elbow gets worse    Fingers become swollen, cold, blue, numb, or tingly    You can t move your elbow as much as you could  Date Last Reviewed: 5/1/2017 2000-2017 [employer]. [address] [place] [address]. All rights reserved. This information is not intended as a substitute for professional medical care. Always follow your healthcare professional's instructions.          Your next 10 appointments already scheduled     May 24, 2018  8:15 AM CDT   LAB with LV LAB   [facility] ([facility])    [address] [facility] [address] [phone]           Please do not eat  10-12 hours before your appointment if you are coming in fasting for labs on lipids, cholesterol, or glucose (sugar). This does not apply to pregnant women. Water, hot tea and black coffee (with nothing added) are okay. Do not drink other fluids, diet soda or chew gum.              24 Hour Appointment Hotline       To make an appointment at any [facility] clinic, call [phone] ([phone]). If you don't have a family doctor or clinic, we will help you find one. [facility] clinics are conveniently located to serve the needs of you and your family.             Review of your medicines      START taking        Dose / Directions Last dose taken    oxyCODONE-acetaminophen 5-325 MG per tablet   Commonly known as:  PERCOCET   Dose:  1-2 tablet   Quantity:  12 tablet        Take 1-2 tablets by mouth every 6 hours as needed for moderate to severe pain   Refills:  0          Our records show that you are taking the medicines listed below. If these are incorrect, please call your family doctor or clinic.        Dose / Directions Last dose taken    ALPRAZolam 0.25 MG tablet   Commonly known as:  XANAX   Dose:  0.25 mg   Quantity:  30 tablet        Take 1 tablet (0.25 mg) by mouth 3 times daily as needed for anxiety   Refills:  0        amLODIPine 5 MG tablet   Commonly known as:  NORVASC   Quantity:  90 tablet        Take 1 tablet (5 mg) by mouth daily   Refills:  3        aspirin 81 MG tablet   Quantity:  100        1 tab po QD (Once per day)   Refills:  5        busPIRone 10 MG tablet   Commonly known as:  BUSPAR   Quantity:  60 tablet        TAKE ONE TABLET BY MOUTH TWICE DAILY   Refills:  8        Calcium-Magnesium-Vitamin D 500-250-200 MG-MG-UNIT Tabs   Dose:  2 tablet        Take 2 tablets by mouth At Bedtime   Refills:  0        DULoxetine 60 MG EC capsule   Commonly known as:  CYMBALTA   Dose:  60 mg   Quantity:  90 capsule        Take 1 capsule (60 mg) by mouth daily   Refills:  3        GLUCOSAMINE COMPLEX PO    Dose:  2 tablet        Take 2 tablets by mouth At Bedtime   Refills:  0        LANsoprazole 30 MG CR capsule   Commonly known as:  PREVACID   Dose:  30 mg        Take 30 mg by mouth At Bedtime   Refills:  0        lisinopril 40 MG tablet   Commonly known as:  PRINIVIL/ZESTRIL   Quantity:  30 tablet        Take 1 tablet (40 mg) by mouth daily   Refills:  0        MULTIVITAMIN TABS   OR        Refills:  0        naproxen 500 MG tablet   Commonly known as:  NAPROSYN   Dose:  500 mg   Quantity:  60 tablet        Take 1 tablet (500 mg) by mouth 2 times daily as needed for moderate pain   Refills:  0        simvastatin 40 MG tablet   Commonly known as:  ZOCOR   Dose:  40 mg   Quantity:  90 tablet        Take 1 tablet (40 mg) by mouth At Bedtime   Refills:  0                Information about OPIOIDS     PRESCRIPTION OPIOIDS: WHAT YOU NEED TO KNOW   You have a prescription for an opioid (narcotic) pain medicine. Opioids can cause addiction. If you have a history of chemical dependency of any type, you are at a higher risk of becoming addicted to opioids. Only take this medicine after all other options have been tried. Take it for as short a time and as few doses as possible.     Do not:    Drive. If you drive while taking these medicines, you could be arrested for driving under the influence (DUI).    Operate heavy machinery    Do any other dangerous activities while taking these medicines.     Drink any alcohol while taking these medicines.      Take with any other medicines that contain acetaminophen. Read all labels carefully. Look for the word  acetaminophen  or  Tylenol.  Ask your pharmacist if you have questions or are unsure.    Store your pills in a secure place, locked if possible. We will not replace any lost or stolen medicine. If you don t finish your medicine, please throw away (dispose) as directed by your pharmacist. The [place] [employer]ution [employer] has more information about safe disposal:  https://www.pca.[facility].[place].us/living-green/managing-unwanted-medications    All opioids tend to cause constipation. Drink plenty of water and eat foods that have a lot of fiber, such as fruits, vegetables, prune juice, apple juice and high-fiber cereal. Take a laxative (Miralax, milk of magnesia, Colace, Senna) if you don t move your bowels at least every other day.         Prescriptions were sent or printed at these locations (1 Prescription)                   Other Prescriptions                Printed at Department/Unit printer (1 of 1)         oxyCODONE-acetaminophen (PERCOCET) 5-325 MG per tablet                Procedures and tests performed during your visit     Humerus XR,  G/E 2 views, left    Radius/Ulna XR,  PA &LAT, left    XR Elbow Left 2 Views    XR Elbow Port Left 2 Views      Orders Needing Specimen Collection     None      Pending Results     Date and Time Order Name Status Description    5/23/2018 1947 XR Elbow Left 2 Views Preliminary     5/23/2018 1947 Radius/Ulna XR,  PA &LAT, left Preliminary     5/23/2018 1947 Humerus XR,  G/E 2 views, left Preliminary             Pending Culture Results     No orders found from 5/21/2018 to 5/24/2018.            Pending Results Instructions     If you had any lab results that were not finalized at the time of your Discharge, you can call the ED Lab Result RN at [phone]. You will be contacted by this team for any positive Lab results or changes in treatment. The nurses are available 7 days a week from 10A to 6:30P.  You can leave a message 24 hours per day and they will return your call.        Test Results From Your Hospital Stay              5/23/2018  8:38 PM      Narrative     ELBOW LEFT TWO VIEWS   FOREARM LEFT TWO VIEWS   HUMERUS LEFT TWO OR MORE VIEWS  5/23/2018 8:25 PM     HISTORY: Injury. Deformity.    COMPARISON: None.        Impression     IMPRESSION:     Left elbow: Dorsal dislocation of the ulna and radius associated with  at least 1.7 cm of  shortening. There is a fracture of the proximal tip  of the olecranon with 0.3 mm distal displacement. No other fractures.    Left forearm: No additional acute bony abnormality.    Left humerus: No additional acute bony abnormality.         5/23/2018  8:38 PM      Narrative     ELBOW LEFT TWO VIEWS   FOREARM LEFT TWO VIEWS   HUMERUS LEFT TWO OR MORE VIEWS  5/23/2018 8:25 PM     HISTORY: Injury. Deformity.    COMPARISON: None.        Impression     IMPRESSION:     Left elbow: Dorsal dislocation of the ulna and radius associated with  at least 1.7 cm of shortening. There is a fracture of the proximal tip  of the olecranon with 0.3 mm distal displacement. No other fractures.    Left forearm: No additional acute bony abnormality.    Left humerus: No additional acute bony abnormality.         5/23/2018  8:38 PM      Narrative     ELBOW LEFT TWO VIEWS   FOREARM LEFT TWO VIEWS   HUMERUS LEFT TWO OR MORE VIEWS  5/23/2018 8:25 PM     HISTORY: Injury. Deformity.    COMPARISON: None.        Impression     IMPRESSION:     Left elbow: Dorsal dislocation of the ulna and radius associated with  at least 1.7 cm of shortening. There is a fracture of the proximal tip  of the olecranon with 0.3 mm distal displacement. No other fractures.    Left forearm: No additional acute bony abnormality.    Left humerus: No additional acute bony abnormality.         5/23/2018  9:59 PM      Narrative     ELBOW TWO VIEWS LEFT  5/23/2018 9:37 PM     HISTORY: post reduction/ portable;     COMPARISON: None.    FINDINGS: The elbow dislocation has been reduced. There is now normal  alignment. Again noted is the fracture of the proximal tip of the  olecranon..  :    HARVEY TENORIO MD                Clinical Quality Measure: Blood Pressure Screening     Your blood pressure was checked while you were in the emergency department today. The last reading we obtained was  BP: (!) 139/119 . Please read the guidelines below about what these numbers mean and what you  "should do about them.  If your systolic blood pressure (the top number) is less than 120 and your diastolic blood pressure (the bottom number) is less than 80, then your blood pressure is normal. There is nothing more that you need to do about it.  If your systolic blood pressure (the top number) is 120-139 or your diastolic blood pressure (the bottom number) is 80-89, your blood pressure may be higher than it should be. You should have your blood pressure rechecked within a year by a primary care provider.  If your systolic blood pressure (the top number) is 140 or greater or your diastolic blood pressure (the bottom number) is 90 or greater, you may have high blood pressure. High blood pressure is treatable, but if left untreated over time it can put you at risk for heart attack, stroke, or kidney failure. You should have your blood pressure rechecked by a primary care provider within the next 4 weeks.  If your provider in the emergency department today gave you specific instructions to follow-up with your doctor or provider even sooner than that, you should follow that instruction and not wait for up to 4 weeks for your follow-up visit.        Thank you for choosing [facility]       Thank you for choosing [facility] for your care. Our goal is always to provide you with excellent care. Hearing back from our patients is one way we can continue to improve our services. Please take a few minutes to complete the written survey that you may receive in the mail after you visit with us. Thank you!        [employer] Information     [employer] lets you send messages to your doctor, view your test results, renew your prescriptions, schedule appointments and more. To sign up, go to [email or web address] . Click on \"Log in\" on the left side of the screen, which will take you to the Welcome page. Then click on \"Sign up Now\" on the right side of the page.     You will be asked to enter the access code listed below, as well as some " personal information. Please follow the directions to create your username and password.     Your access code is: [email or web address]  Expires: 2018  9:43 PM     Your access code will  in 90 days. If you need help or a new code, please call your [facility] clinic or [phone].        Care EveryWhere ID     This is your Care EveryWhere ID. This could be used by other organizations to access your [facility] medical records  [id number]        Equal Access to Services     AMY BLANCA : Hadjerel walkero Sobelgica, waaxda luqadaha, qaybta kaalmada christopher, paulina mckeon . So [facility] [phone].    ATENCIÓN: Si habla español, tiene a headley disposición servicios gratuitos de asistencia lingüística. Llame al [phone].    We comply with applicable federal civil rights laws and [place] laws. We do not discriminate on the basis of race, color, national origin, age, disability, sex, sexual orientation, or gender identity.            After Visit Summary       This is your record. Keep this with you and show to your community pharmacist(s) and doctor(s) at your next visit.

## 2018-05-24 NOTE — PROGRESS NOTES
RT Note      RT with MD at bedside. Monitored respiratory rate, SPO2, ETCO2, and heart rate. Emergency equipment at bedside. Spent a few minutes supporting jaw thrust. Patient tolerated procedure well.      Vicki Ny, RRT

## 2018-05-24 NOTE — DISCHARGE INSTRUCTIONS
Elbow Fracture    You have a break (fracture) of one or more bones of your elbow joint. This may be a small crack in the bone. Or it may be a major break, with the broken parts pushed out of position.  This fracture usually takes 4 to 12 weeks to heal, depending on the type. The first step in treatment is with a splint or cast. Severe fractures may need surgery to put the bone fragments back into place.  Home care  Follow these guidelines when caring for yourself at home:    Keep your arm elevated to reduce pain and swelling. When sitting or lying down keep your arm above the level of your heart. You can do this by placing your arm on a pillow that rests on your chest or on a pillow at your side. This is most important during the first 2 days (48 hours) after the injury.    Put an ice pack on the injured area. Do this for 20 minutes every 1 to 2 hours the first day. You can make an ice pack by wrapping a plastic bag of ice cubes in a thin towel. As the ice melts, be careful that the cast or splint doesn t get wet. You can place the ice pack inside the sling and directly over the splint or cast. Continue to use the ice pack 3 to 4 times a day for the next 2 days. Then use the ice pack as needed to ease pain and swelling.    Keep the splint or cast completely dry at all times. Bathe with your splint or cast out of the water. Protect it with a large plastic bag, rubber-banded at the top end. If a fiberglass splint or cast gets wet, you can dry it with a hair dryer.    You may use acetaminophen or ibuprofen to control pain, unless another pain medicine was prescribed. If you have chronic liver or kidney disease, talk with your healthcare provider before using these medicines. Also talk with your provider if you ve had a stomach ulcer or gastrointestinal bleeding.    Don t put creams or objects under the cast if you have itching.  Follow-up care  Follow up with your healthcare provider in 1 week, or as advised. This  is to make sure the bone is healing the way it should. If a splint was put on, it may be changed to a cast during your follow-up visit.  X-rays may be taken. You will be told of any new findings that may affect your care.  When to seek medical advice  Call your healthcare provider right away if any of these occur:    The cast or splint cracks    The plaster cast or splint becomes wet or soft    The fiberglass cast or splint stays wet for more than 24 hours    Tightness or pain under the cast or splint gets worse    Bad odor from the cast or wound fluid stains the cast    Fingers become swollen, cold, blue, numb, or tingly    You can t move your fingers    Skin around cast becomes red    Fever of 100.4 F (38 C) or higher, or as directed by your healthcare provider   Date Last Reviewed: 2/6/2017 2000-2017 [employer]. [address] [place] [address]. All rights reserved. This information is not intended as a substitute for professional medical care. Always follow your healthcare professional's instructions.        Elbow Dislocation  An elbow dislocation may occur after a fall onto an outstretched arm or in a car accident. When the hand hits a hard surface, the force is sent to the elbow. This tears ligaments and forces the bones out of the joint. Usually no bones are broken. But the nearby nerves and blood vessels can be damaged.     Once the joint is put back in place, it will take about 6 weeks for the ligaments to heal. For simple dislocations, you will use a splint or sling for the first few weeks. You will need range-of-motion exercises or physical therapy early in your recovery. This will prevent the elbow joint from getting stiff. Later, you may need strengthening exercises.  In more severe cases, you may need surgery to realign the joint and repair the torn ligaments or broken bones. Most elbow dislocations heal fully. But there is some risk for arthritis or loss of full range of  motion in that joint.  Home care  Follow these guidelines when caring for yourself at home:    Keep your arm elevated to reduce pain and swelling. When sitting or lying down keep your arm above the level of your heart. You can do this by placing your arm on a pillow that rests on your chest or on a pillow at your side. This is most important during the first 2 days (48 hours) after the injury.    Put an ice pack on the injured area. Do this for 20 minutes every 1 to 2 hours the first day. You can make an ice pack by wrapping a plastic bag of ice cubes in a thin towel. As the ice melts, be careful that the cast or splint doesn t get wet. You can put the ice pack inside the sling and directly over the splint or cast. Continue using the ice pack 3 to 4 times a day for the next 2 days. Then use the ice pack as needed to ease pain and swelling.    Keep the splint or cast completely dry at all times. Bathe with your splint or cast out of the water. Protect it with a large plastic bag, rubber-banded at the top end. If a fiberglass splint or cast gets wet, you can dry it with a hair dryer on the cool setting.    You may use acetaminophen or ibuprofen to control pain, unless another pain medicine was prescribed. If you have chronic liver or kidney disease, talk with your healthcare provider before using these medicines. Also talk with your provider if you ve had a stomach ulcer or gastrointestinal bleeding.    Don t take part in sports or physical education until your healthcare provider says it s OK to do so.  Follow-up care  Follow up with your healthcare provider in 1 week, or as advised. Ask your provider when to start range-of-motion exercises. These will keep the elbow from getting stiff.  If X-rays were taken, a radiologist may look at them. You will be told of any new findings that may affect your care.  When to seek medical advice  Call your healthcare provider right away if any of these occur:    The plaster splint  or cast becomes wet or soft    The fiberglass splint or cast stays wet for more than 24 hours    Tightness or pain in the elbow gets worse    Fingers become swollen, cold, blue, numb, or tingly    You can t move your elbow as much as you could  Date Last Reviewed: 5/1/2017 2000-2017 [employer]. [address] [place] [id number]. All rights reserved. This information is not intended as a substitute for professional medical care. Always follow your healthcare professional's instructions.

## 2018-05-24 NOTE — ED NOTES
Bed: ED12  Expected date: 5/23/18  Expected time: 6:58 PM  Means of arrival: Ambulance  Comments:  Jason Velasquez

## 2018-05-24 NOTE — ED PROVIDER NOTES
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 The patient was walking through a hallway at her house when she stepped on something sideways accidentally and fell, landing on her left elbow. She did not hit her head or lose consciousness. She states that she heard her arm break and had noticeable deformity to the elbow. She has since experience pain to the left arm with a brief episode of numbness in her left fingers that has since resolved. The patient last ate or drank at about 1730. She is not anticoagulated.     Allergies:  Gabapentin    Medications:    Xanax  Norvasc  Aspirin  Glucosamine   Buspar  Calcium magnesium vitamin D, Multivitamin   Cymbalta  Prevacid  Lisinopril  Naprosyn   Zocor    Past Medical History:    Essential hypertension, benign   Hypopotassemia   Internal hemorrhoids   Mixed hyperlipidemia   Obesity     Past Surgical History:    Laparoscopic appendectomy  Tonsillectomy, adenoidectomy     Family History:    Hypertension  Mother, Father, Brother  Heart Disease  Mother, Father  COPD   Father  Interstitial Cystitis  Sister    Social History:  Smoking Status: Never Smoker  Smokeless Tobacco: Never Used  Alcohol Use: No (Rarely)  Marital Status: Single    Review of Systems   Musculoskeletal:        Left arm pain   Neurological: Positive for numbness.   All other systems reviewed and are negative.    Physical Exam     Patient Vitals for the past 24 hrs:   BP Temp Temp src Pulse Heart Rate Resp SpO2 Height Weight   05/23/18 2209 (!) 150/93 - - - 87 - 96 % - -   05/23/18 2200 (!) 173/105 - - - - - - - -   05/23/18 2145 (!) 153/92 - - - 96 - - - -   05/23/18 2140 - - - - 98 22 - - -   05/23/18 2136 (!) 139/119 - - - 92 12 - - -   05/23/18 2135 - - - - 94 16 95 % - -   05/23/18 2130 (!) 150/92 - - - - - - - -   05/23/18 2125 - - - - 93 12 98 % - -  "  05/23/18 2124 160/72 - - - 90 26 98 % - -   05/23/18 2120 - - - - 91 10 93 % - -   05/23/18 2100 (!) 189/94 - - - 92 10 93 % - -   05/23/18 2059 - - - - - - 97 % - -   05/23/18 2055 - - - - 95 (!) 7 98 % - -   05/23/18 2050 - - - - 95 10 100 % - -   05/23/18 2045 (!) 199/107 - - - 95 9 98 % - -   05/23/18 2040 - - - - 96 10 99 % - -   05/23/18 2035 - - - - 97 (!) 41 98 % - -   05/23/18 2030 - - - - 90 12 100 % - -   05/23/18 2008 - - - - - - 93 % - -   05/23/18 1922 129/84 99.1  F (37.3  C) Oral 90 - 16 96 % 1.6 m (5' 3\") 73.9 kg (163 lb)       Physical Exam  General: Alert, appears well-developed and well-nourished. Cooperative.     In moderate distress  HEENT:  Head:  Atraumatic  Ears:  External ears are normal  Mouth/Throat:  Oropharynx is without erythema or exudate and mucous membranes are moist.   Eyes:   Conjunctivae normal and EOM are normal. No scleral icterus.  Neck:   Normal range of motion. Neck supple.  CV:  Normal rate, regular rhythm, normal heart sounds and radial pulses are 2+ and symmetric. 2/6 systolic murmur.   Resp:  Breath sounds are clear bilaterally    Non-labored, no retractions or accessory muscle use  GI:  Abdomen is soft, no distension, no tenderness. No rebound or guarding.  No CVA tenderness bilaterally  MS:  No edema. Left distal humorous with deformity, deformity continuous into the left elbow. Forearm without tenderness to mid shaft. Distal CMS to LUE normal. No tenderness to left shoulder. Axillary nerve function normal.     Back atraumatic.    No midline cervical, thoracic, or lumbar tenderness  Skin:  Warm and dry.  No rash or lesions noted.  Neuro:   Alert. Sensation intact in all 4 extremities. GCS: 15  Psych: Normal mood and affect.    Emergency Department Course     Imaging:  Radiology findings were communicated with the patient who voiced understanding of the findings.  Humerus XR,  G/E 2 views, left   Final Result   IMPRESSION:       Left elbow: Dorsal dislocation of the " ulna and radius associated with   at least 1.7 cm of shortening. There is a fracture of the proximal tip   of the olecranon with 0.3 mm distal displacement. No other fractures.      Left forearm: No additional acute bony abnormality.      Left humerus: No additional acute bony abnormality.      JUAN CARROLL MD      Radius/Ulna XR,  PA &LAT, left   Final Result   IMPRESSION:       Left elbow: Dorsal dislocation of the ulna and radius associated with   at least 1.7 cm of shortening. There is a fracture of the proximal tip   of the olecranon with 0.3 mm distal displacement. No other fractures.      Left forearm: No additional acute bony abnormality.      Left humerus: No additional acute bony abnormality.      JUAN CARROLL MD      XR Elbow Left 2 Views   Final Result   IMPRESSION:       Left elbow: Dorsal dislocation of the ulna and radius associated with   at least 1.7 cm of shortening. There is a fracture of the proximal tip   of the olecranon with 0.3 mm distal displacement. No other fractures.      Left forearm: No additional acute bony abnormality.      Left humerus: No additional acute bony abnormality.      JUAN CARROLL MD      XR Elbow Port Left 2 Views   Final Result          Procedures:  Procedure Note: Reduction of elbow joint dislocation  Physician: Demian Hernandez MD  Diagnosis: Posterior elbow dislocation   Consent: Informed written, see paper chart  Description of Procedure: Consent as above.  Patient positioned in supine position.  Procedural anesthesia was utilized, see above.  The arm was grasped with the elbow maintained at 90 degrees.  Gentle longitudinal traction was applied and the elbow was reduced back into the glenoid fossa.  The neurovascular exam was normal before and after reduction attempt.  The patient tolerated the procedure well, there were no complications.      Procedure Note: Splint Placement  Physician: Demian Hernandez MD  Diagnosis: Posterior elbow dislocation   Consent: Informed  written, see paper chart  Description of Procedure:  Following reduction of elbow and placement of webril for comfort and padding, orthoglass sugartong splint was applied in standard fashion. It was secured with ace-wraps.   The elbow was maintained at 90 degrees flexion.   The neurovascular exam was normal before and after splint placement.  The patient tolerated the procedure well, there were no complications. A sling was applied.      Procedure Note: Procedural Anesthesia  Physician: Demian Hernandez MD  Expected Level: Deep Sedation.  Indication: Sedation is required to allow for reduction and splint placement.  Consent:  Risks, benefits and alternatives were discussed with patient and consent for procedure was obtained.  Timeout:  Universal protocol was followed. TIME OUT conducted just prior to starting  procedure confirmed patient identity, site/side, procedure, patient position, and availability of correct equipment and implants.?  PO Intake: 4 hours   ASA Class: 2  Mallampati: 2  Medication: Propofol    Monitoring: Monitoring consisted of: heart rate, cardiac monitor, continuous pulse oximetry, frequent blood pressure checks, level of consciousness, IV access, constant attendance by RN until patient recovered.  Response: Vital signs stable during procedure. No desaturations were noted.    Patient Status: Post procedure patient was alert and interactive.  .  Total Physician Drug Administration / Monitoring Time: 15 minutes of which I was personally present and monitoring the patient. Patient was monitored during recovery and returned to pre-procedure baseline.    Interventions:  1951 Dilaudid 0.5 mg IV   1952 NS Bolus IV   2007 Dilaudid 0.5 mg IV   2038 Dilaudid 0.5 mg IV   2115 Diprivan 90 mg IV     Emergency Department Course:     Nursing notes and vitals reviewed.     I performed an exam of the patient as documented above.     IV was inserted and blood was drawn for laboratory testing, results above.     2112  I reduced the patient's elbow, procedure as above.    2148 I checked on and updated the patient.    I personally reviewed the imaging results with the patient and answered all related questions prior to discharge.    Impression & Plan      Medical Decision Making:  Mariah is a 57-year-old female who presents with a left elbow dislocation.  X-rays confirm a closed fracture to the proximal tip of the olecranon with minimal displacement and posterior elbow dislocation.  Patient was consented for procedural sedation and reduction.  No complications with sedation and reduction, see procedure notes.  We were able to successfully reduce the left elbow and place a sugar tong and shoulder sling on the patient.  Postreduction films show appropriate elbow reduction and continues to show proximal tip fracture of olecranon.  Patient will follow closely with [facility] orthopedics for recheck in one week.  We discussed pain control and need for close follow-up.  She understood close return precautions and follow-up instructions.  Discharged home after all questions answered.    Diagnosis:    ICD-10-CM    1. Elbow dislocation, left, initial encounter S53.105A    2. Olecranon fracture, left, closed, initial encounter S52.022A    3. Fall, initial encounter W19.XXXA      Disposition:   The patient was discharged home.      Discharge Medications:  Discharge Medication List as of 5/23/2018 10:09 PM      START taking these medications    Details   oxyCODONE-acetaminophen (PERCOCET) 5-325 MG per tablet Take 1-2 tablets by mouth every 6 hours as needed for moderate to severe pain, Disp-12 tablet, R-0, Local Print             Scribe Disclosure:  I, Penny Kilpatrick, am serving as a scribe at 7:42 PM on 5/23/2018 to document services personally performed by Demian Hernandez MD, based on my observations and the provider's statements to me.    [facility] EMERGENCY DEPARTMENT       Demian Hernandez MD  05/24/18 0106

## 2018-05-24 NOTE — ED NOTES
"Pt asking how much dilaudid is being given, told it was.5mg, pt states \"well you'll be running a lot then, dilaudid doesn't last long ya know and 0.5 mg won't do anything\".      "

## 2018-06-06 DIAGNOSIS — I10 ESSENTIAL HYPERTENSION, BENIGN: ICD-10-CM

## 2018-06-06 DIAGNOSIS — E78.5 HYPERLIPIDEMIA LDL GOAL <160: ICD-10-CM

## 2018-06-06 LAB
ANION GAP SERPL CALCULATED.3IONS-SCNC: 9 MMOL/L (ref 3–14)
BUN SERPL-MCNC: 9 MG/DL (ref 7–30)
CALCIUM SERPL-MCNC: 8.7 MG/DL (ref 8.5–10.1)
CHLORIDE SERPL-SCNC: 102 MMOL/L (ref 94–109)
CHOLEST SERPL-MCNC: 175 MG/DL
CO2 SERPL-SCNC: 27 MMOL/L (ref 20–32)
CREAT SERPL-MCNC: 0.6 MG/DL (ref 0.52–1.04)
GFR SERPL CREATININE-BSD FRML MDRD: >90 ML/MIN/1.7M2
GLUCOSE SERPL-MCNC: 86 MG/DL (ref 70–99)
HDLC SERPL-MCNC: 46 MG/DL
LDLC SERPL CALC-MCNC: 100 MG/DL
NONHDLC SERPL-MCNC: 129 MG/DL
POTASSIUM SERPL-SCNC: 4 MMOL/L (ref 3.4–5.3)
SODIUM SERPL-SCNC: 138 MMOL/L (ref 133–144)
TRIGL SERPL-MCNC: 144 MG/DL

## 2018-06-06 PROCEDURE — 36415 COLL VENOUS BLD VENIPUNCTURE: CPT | Performed by: INTERNAL MEDICINE

## 2018-06-06 PROCEDURE — 80048 BASIC METABOLIC PNL TOTAL CA: CPT | Performed by: INTERNAL MEDICINE

## 2018-06-06 PROCEDURE — 80061 LIPID PANEL: CPT | Performed by: INTERNAL MEDICINE

## 2018-06-12 ENCOUNTER — TRANSFERRED RECORDS (OUTPATIENT)
Dept: HEALTH INFORMATION MANAGEMENT | Facility: CLINIC | Age: 58
End: 2018-06-12

## 2018-06-28 ENCOUNTER — APPOINTMENT (OUTPATIENT)
Dept: GENERAL RADIOLOGY | Facility: CLINIC | Age: 58
End: 2018-06-28
Attending: EMERGENCY MEDICINE
Payer: COMMERCIAL

## 2018-06-28 ENCOUNTER — APPOINTMENT (OUTPATIENT)
Dept: CT IMAGING | Facility: CLINIC | Age: 58
End: 2018-06-28
Attending: EMERGENCY MEDICINE
Payer: COMMERCIAL

## 2018-06-28 ENCOUNTER — HOSPITAL ENCOUNTER (EMERGENCY)
Facility: CLINIC | Age: 58
Discharge: ANOTHER HOSPITAL | End: 2018-06-29
Attending: EMERGENCY MEDICINE | Admitting: EMERGENCY MEDICINE
Payer: COMMERCIAL

## 2018-06-28 DIAGNOSIS — M54.2 NECK PAIN: ICD-10-CM

## 2018-06-28 DIAGNOSIS — S02.0XXA CLOSED FRACTURE OF FRONTAL BONE, INITIAL ENCOUNTER (H): ICD-10-CM
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
## 2018-06-28 PROCEDURE — 80320 DRUG SCREEN QUANTALCOHOLS: CPT | Performed by: EMERGENCY MEDICINE

## 2018-06-28 PROCEDURE — 25000128 H RX IP 250 OP 636: Performed by: EMERGENCY MEDICINE

## 2018-06-28 PROCEDURE — 72125 CT NECK SPINE W/O DYE: CPT

## 2018-06-28 PROCEDURE — 96375 TX/PRO/DX INJ NEW DRUG ADDON: CPT

## 2018-06-28 PROCEDURE — 72072 X-RAY EXAM THORAC SPINE 3VWS: CPT

## 2018-06-28 PROCEDURE — 85025 COMPLETE CBC W/AUTO DIFF WBC: CPT | Performed by: EMERGENCY MEDICINE

## 2018-06-28 PROCEDURE — 96376 TX/PRO/DX INJ SAME DRUG ADON: CPT

## 2018-06-28 PROCEDURE — 70450 CT HEAD/BRAIN W/O DYE: CPT

## 2018-06-28 PROCEDURE — 80048 BASIC METABOLIC PNL TOTAL CA: CPT | Performed by: EMERGENCY MEDICINE

## 2018-06-28 PROCEDURE — 99285 EMERGENCY DEPT VISIT HI MDM: CPT | Mod: 25

## 2018-06-28 PROCEDURE — 71046 X-RAY EXAM CHEST 2 VIEWS: CPT

## 2018-06-28 RX ORDER — HYDROMORPHONE HYDROCHLORIDE 1 MG/ML
0.5 INJECTION, SOLUTION INTRAMUSCULAR; INTRAVENOUS; SUBCUTANEOUS
Status: COMPLETED | OUTPATIENT
Start: 2018-06-28 | End: 2018-06-29

## 2018-06-28 RX ORDER — ONDANSETRON 2 MG/ML
4 INJECTION INTRAMUSCULAR; INTRAVENOUS ONCE
Status: COMPLETED | OUTPATIENT
Start: 2018-06-28 | End: 2018-06-28

## 2018-06-28 RX ORDER — ONDANSETRON 2 MG/ML
4 INJECTION INTRAMUSCULAR; INTRAVENOUS ONCE
Status: DISCONTINUED | OUTPATIENT
Start: 2018-06-28 | End: 2018-06-29 | Stop reason: HOSPADM

## 2018-06-28 RX ADMIN — Medication 0.5 MG: at 22:56

## 2018-06-28 RX ADMIN — ONDANSETRON 4 MG: 2 INJECTION, SOLUTION INTRAMUSCULAR; INTRAVENOUS at 22:54

## 2018-06-28 RX ADMIN — Medication 0.5 MG: at 23:41

## 2018-06-28 ASSESSMENT — ENCOUNTER SYMPTOMS
NECK PAIN: 1
BACK PAIN: 1
HEADACHES: 1

## 2018-06-29 ENCOUNTER — HOSPITAL ENCOUNTER (OUTPATIENT)
Facility: CLINIC | Age: 58
Setting detail: OBSERVATION
Discharge: HOME | End: 2018-06-29
Attending: INTERNAL MEDICINE | Admitting: INTERNAL MEDICINE
Payer: COMMERCIAL

## 2018-06-29 VITALS
RESPIRATION RATE: 16 BRPM | DIASTOLIC BLOOD PRESSURE: 71 MMHG | TEMPERATURE: 97.8 F | OXYGEN SATURATION: 99 % | SYSTOLIC BLOOD PRESSURE: 117 MMHG

## 2018-06-29 VITALS
HEART RATE: 88 BPM | DIASTOLIC BLOOD PRESSURE: 79 MMHG | RESPIRATION RATE: 18 BRPM | SYSTOLIC BLOOD PRESSURE: 142 MMHG | OXYGEN SATURATION: 95 % | TEMPERATURE: 98.1 F

## 2018-06-29 DIAGNOSIS — S02.0XXA CLOSED FRACTURE OF FRONTAL BONE, INITIAL ENCOUNTER (H): Primary | ICD-10-CM

## 2018-06-29 PROBLEM — S02.91XA SKULL FRACTURE (H): Status: ACTIVE | Noted: 2018-06-29

## 2018-06-29 LAB
ANION GAP SERPL CALCULATED.3IONS-SCNC: 6 MMOL/L (ref 3–14)
BASOPHILS # BLD AUTO: 0.1 10E9/L (ref 0–0.2)
BASOPHILS NFR BLD AUTO: 0.7 %
BUN SERPL-MCNC: 8 MG/DL (ref 7–30)
CALCIUM SERPL-MCNC: 8.7 MG/DL (ref 8.5–10.1)
CHLORIDE SERPL-SCNC: 105 MMOL/L (ref 94–109)
CO2 SERPL-SCNC: 29 MMOL/L (ref 20–32)
CREAT SERPL-MCNC: 0.63 MG/DL (ref 0.52–1.04)
DIFFERENTIAL METHOD BLD: NORMAL
EOSINOPHIL # BLD AUTO: 0.2 10E9/L (ref 0–0.7)
EOSINOPHIL NFR BLD AUTO: 2.5 %
ERYTHROCYTE [DISTWIDTH] IN BLOOD BY AUTOMATED COUNT: 13.4 % (ref 10–15)
ETHANOL SERPL-MCNC: <0.01 G/DL
GFR SERPL CREATININE-BSD FRML MDRD: >90 ML/MIN/1.7M2
GLUCOSE SERPL-MCNC: 126 MG/DL (ref 70–99)
HCT VFR BLD AUTO: 38.7 % (ref 35–47)
HGB BLD-MCNC: 12.4 G/DL (ref 11.7–15.7)
IMM GRANULOCYTES # BLD: 0.1 10E9/L (ref 0–0.4)
IMM GRANULOCYTES NFR BLD: 1.9 %
LYMPHOCYTES # BLD AUTO: 2.5 10E9/L (ref 0.8–5.3)
LYMPHOCYTES NFR BLD AUTO: 33.7 %
MCH RBC QN AUTO: 31 PG (ref 26.5–33)
MCHC RBC AUTO-ENTMCNC: 32 G/DL (ref 31.5–36.5)
MCV RBC AUTO: 97 FL (ref 78–100)
MONOCYTES # BLD AUTO: 0.5 10E9/L (ref 0–1.3)
MONOCYTES NFR BLD AUTO: 7.4 %
NEUTROPHILS # BLD AUTO: 3.9 10E9/L (ref 1.6–8.3)
NEUTROPHILS NFR BLD AUTO: 53.8 %
NRBC # BLD AUTO: 0 10*3/UL
NRBC BLD AUTO-RTO: 0 /100
PLATELET # BLD AUTO: 365 10E9/L (ref 150–450)
POTASSIUM SERPL-SCNC: 3.5 MMOL/L (ref 3.4–5.3)
RBC # BLD AUTO: 4 10E12/L (ref 3.8–5.2)
SODIUM SERPL-SCNC: 140 MMOL/L (ref 133–144)
WBC # BLD AUTO: 7.3 10E9/L (ref 4–11)

## 2018-06-29 PROCEDURE — G0378 HOSPITAL OBSERVATION PER HR: HCPCS

## 2018-06-29 PROCEDURE — 99220 ZZC INITIAL OBSERVATION CARE,LEVL III: CPT | Performed by: INTERNAL MEDICINE

## 2018-06-29 PROCEDURE — 25000128 H RX IP 250 OP 636: Performed by: EMERGENCY MEDICINE

## 2018-06-29 PROCEDURE — 25000128 H RX IP 250 OP 636: Performed by: INTERNAL MEDICINE

## 2018-06-29 PROCEDURE — 99221 1ST HOSP IP/OBS SF/LOW 40: CPT | Performed by: PHYSICIAN ASSISTANT

## 2018-06-29 PROCEDURE — 96374 THER/PROPH/DIAG INJ IV PUSH: CPT

## 2018-06-29 PROCEDURE — 25000132 ZZH RX MED GY IP 250 OP 250 PS 637: Performed by: INTERNAL MEDICINE

## 2018-06-29 PROCEDURE — 96366 THER/PROPH/DIAG IV INF ADDON: CPT

## 2018-06-29 PROCEDURE — 99207 ZZC CDG-MDM COMPONENT: MEETS MODERATE - UP CODED: CPT | Performed by: INTERNAL MEDICINE

## 2018-06-29 PROCEDURE — 99207 ZZC APP CREDIT; MD BILLING SHARED VISIT: CPT | Performed by: INTERNAL MEDICINE

## 2018-06-29 PROCEDURE — 96376 TX/PRO/DX INJ SAME DRUG ADON: CPT

## 2018-06-29 PROCEDURE — 96365 THER/PROPH/DIAG IV INF INIT: CPT

## 2018-06-29 RX ORDER — ORPHENADRINE CITRATE 100 MG/1
100 TABLET, EXTENDED RELEASE ORAL 2 TIMES DAILY PRN
COMMUNITY
End: 2019-02-28

## 2018-06-29 RX ORDER — ONDANSETRON 2 MG/ML
4 INJECTION INTRAMUSCULAR; INTRAVENOUS EVERY 6 HOURS PRN
Status: DISCONTINUED | OUTPATIENT
Start: 2018-06-29 | End: 2018-06-29 | Stop reason: HOSPADM

## 2018-06-29 RX ORDER — AMPICILLIN AND SULBACTAM 2; 1 G/1; G/1
3 INJECTION, POWDER, FOR SOLUTION INTRAMUSCULAR; INTRAVENOUS ONCE
Status: COMPLETED | OUTPATIENT
Start: 2018-06-29 | End: 2018-06-29

## 2018-06-29 RX ORDER — HYDROMORPHONE HYDROCHLORIDE 1 MG/ML
0.3 INJECTION, SOLUTION INTRAMUSCULAR; INTRAVENOUS; SUBCUTANEOUS
Status: DISCONTINUED | OUTPATIENT
Start: 2018-06-29 | End: 2018-06-29 | Stop reason: HOSPADM

## 2018-06-29 RX ORDER — ACETAMINOPHEN 650 MG/1
650 SUPPOSITORY RECTAL EVERY 4 HOURS PRN
Status: DISCONTINUED | OUTPATIENT
Start: 2018-06-29 | End: 2018-06-29 | Stop reason: HOSPADM

## 2018-06-29 RX ORDER — OXYCODONE HYDROCHLORIDE 5 MG/1
5-10 TABLET ORAL
Status: DISCONTINUED | OUTPATIENT
Start: 2018-06-29 | End: 2018-06-29 | Stop reason: HOSPADM

## 2018-06-29 RX ORDER — HYDROMORPHONE HYDROCHLORIDE 1 MG/ML
0.5 INJECTION, SOLUTION INTRAMUSCULAR; INTRAVENOUS; SUBCUTANEOUS ONCE
Status: COMPLETED | OUTPATIENT
Start: 2018-06-29 | End: 2018-06-29

## 2018-06-29 RX ORDER — ONDANSETRON 4 MG/1
4 TABLET, ORALLY DISINTEGRATING ORAL EVERY 6 HOURS PRN
Status: DISCONTINUED | OUTPATIENT
Start: 2018-06-29 | End: 2018-06-29 | Stop reason: HOSPADM

## 2018-06-29 RX ORDER — PROCHLORPERAZINE 25 MG
25 SUPPOSITORY, RECTAL RECTAL EVERY 12 HOURS PRN
Status: DISCONTINUED | OUTPATIENT
Start: 2018-06-29 | End: 2018-06-29 | Stop reason: HOSPADM

## 2018-06-29 RX ORDER — PROCHLORPERAZINE MALEATE 5 MG
10 TABLET ORAL EVERY 6 HOURS PRN
Status: DISCONTINUED | OUTPATIENT
Start: 2018-06-29 | End: 2018-06-29 | Stop reason: HOSPADM

## 2018-06-29 RX ORDER — NALOXONE HYDROCHLORIDE 0.4 MG/ML
.1-.4 INJECTION, SOLUTION INTRAMUSCULAR; INTRAVENOUS; SUBCUTANEOUS
Status: DISCONTINUED | OUTPATIENT
Start: 2018-06-29 | End: 2018-06-29 | Stop reason: HOSPADM

## 2018-06-29 RX ORDER — OXYCODONE HYDROCHLORIDE 5 MG/1
5 TABLET ORAL EVERY 6 HOURS PRN
Qty: 12 TABLET | Refills: 0 | Status: SHIPPED | OUTPATIENT
Start: 2018-06-29 | End: 2018-07-02

## 2018-06-29 RX ORDER — LIDOCAINE 40 MG/G
CREAM TOPICAL
Status: DISCONTINUED | OUTPATIENT
Start: 2018-06-29 | End: 2018-06-29 | Stop reason: HOSPADM

## 2018-06-29 RX ORDER — ACETAMINOPHEN 325 MG/1
650 TABLET ORAL EVERY 4 HOURS PRN
Status: DISCONTINUED | OUTPATIENT
Start: 2018-06-29 | End: 2018-06-29 | Stop reason: HOSPADM

## 2018-06-29 RX ORDER — AMPICILLIN AND SULBACTAM 2; 1 G/1; G/1
3 INJECTION, POWDER, FOR SOLUTION INTRAMUSCULAR; INTRAVENOUS EVERY 6 HOURS
Status: DISCONTINUED | OUTPATIENT
Start: 2018-06-29 | End: 2018-06-29

## 2018-06-29 RX ADMIN — Medication 0.5 MG: at 00:28

## 2018-06-29 RX ADMIN — OXYCODONE HYDROCHLORIDE 5 MG: 5 TABLET ORAL at 03:55

## 2018-06-29 RX ADMIN — AMPICILLIN SODIUM AND SULBACTAM SODIUM 3 G: 2; 1 INJECTION, POWDER, FOR SOLUTION INTRAMUSCULAR; INTRAVENOUS at 07:00

## 2018-06-29 RX ADMIN — ACETAMINOPHEN 650 MG: 325 TABLET, FILM COATED ORAL at 07:04

## 2018-06-29 RX ADMIN — HYDROMORPHONE HYDROCHLORIDE 0.5 MG: 10 INJECTION, SOLUTION INTRAMUSCULAR; INTRAVENOUS; SUBCUTANEOUS at 02:03

## 2018-06-29 RX ADMIN — AMPICILLIN SODIUM AND SULBACTAM SODIUM 3 G: 2; 1 INJECTION, POWDER, FOR SOLUTION INTRAMUSCULAR; INTRAVENOUS at 00:17

## 2018-06-29 RX ADMIN — OXYCODONE HYDROCHLORIDE 5 MG: 5 TABLET ORAL at 10:28

## 2018-06-29 ASSESSMENT — ENCOUNTER SYMPTOMS
ABDOMINAL PAIN: 0
SHORTNESS OF BREATH: 0
CONFUSION: 0
WOUND: 1
RHINORRHEA: 0

## 2018-06-29 NOTE — PROGRESS NOTES
[facility] - Suicide Severity Rating Scale Completed? Yes  If yes, what color did the patient score? White

## 2018-06-29 NOTE — DISCHARGE SUMMARY
[facility]    Discharge Summary  Hospitalist    Date of Admission:  6/29/2018  Date of Discharge:  6/29/2018  Discharging Provider: Merlin Irwin  Date of Service (when I saw the patient): 06/29/18    Discharge Diagnoses     Traumatic left frontal skull fracture      History of Present Illness   Mariah Mart is a 57 year old female who presents with a fall down 3 steps onto carpeted concrete found to have nondisplaced left frontal skull fracture.    Hospital Course   Mariah Mart was admitted on 6/29/2018.  The following problems were addressed during her hospitalization:       Traumatic left frontal skull fracture  Involves superior aspect of left orbit, transverses left frontal sinus.  Somewhat concerning as this is patient's second fall in approximately 1 month.  First fall resulted in left elbow dislocation/fracture.  Patient denies syncope.  States that this fall occurred as she was attempting to rush down the stairs. Neurosurgery was consulted for trauma evaluation. No surgical intervention was recommended. Her PTA Asa was held during her stay and on d/c until follow up was completed. She was treated with IV Unasyn prophylactically and transitioned to po Augmentin on discharge for 10 days.  Patient counseled on avoiding high pressure activities such as attempting to blow nose, equalize TM pressures, holding sneezes as well as monitoring for evidence of CSF leak.  She was to follow up with the spine and brain clinic in 2 weeks with a repeat CT head.       Recent Left proximal olecranon fracture with dorsal dislocation of ulna and radius  Following a mechanical fall as she stumbled in the hallway 5/23/18.  Has followed with [facility] orthopedics, is increasing her range of motion at home without assistance of physical therapy. Continue outpatient ranging regimen.       # Discharge Pain Plan:   - During her hospitalization, Mariah experienced pain due to Skull fracture.  The  pain plan for discharge was discussed with Mariah and the plan was created in a collaborative fashion.    - Opioids prescribed on discharge: Oxycodone  - Duration of opioids after discharge: Per CDC opioid prescribing guidelines, a 3 day prescription of opioids was provided.  - Bowel regimen: not needed      Merlin Irwin    Significant Results and Procedures   See below    Pending Results   These results will be followed up by PCP, [facility]  Unresulted Labs Ordered in the Past 30 Days of this Admission     No orders found from 4/30/2018 to 6/30/2018.          Code Status   Full Code       Primary Care Physician   Jennifer Dickerson    Physical Exam   Temp: 97.8  F (36.6  C) Temp src: Oral BP: 117/71   Heart Rate: 76 Resp: 16 SpO2: 99 % O2 Device: Nasal cannula Oxygen Delivery: 2 LPM  There were no vitals filed for this visit.  Vital Signs with Ranges  Temp:  [97.8  F (36.6  C)-98.1  F (36.7  C)] 97.8  F (36.6  C)  Pulse:  [88] 88  Heart Rate:  [76-93] 76  Resp:  [16-18] 16  BP: (117-142)/(71-81) 117/71  SpO2:  [95 %-100 %] 99 %  I/O last 3 completed shifts:  In: 500 [P.O.:500]  Out: -     Gen: Patient in no acute distress.  Appears comfortable.  HEENT: Bruising around her eyes b/l   Heart:  S1S2+, regular rate and rhythm, No murmurs.  Lungs:  Clear to auscultation, no wheezing, no rales.   Abdomen:  Soft, non tender, non distended, bowel sounds positive.  Extremities:  No edema.    Discharge Disposition   Discharged to home  Condition at discharge: Stable    Consultations This Hospital Stay   NEUROSURGERY IP CONSULT    Time Spent on this Encounter   I, Merlin Irwin, personally saw the patient today and spent greater than 30 minutes discharging this patient.    Discharge Orders     Follow-up and recommended labs and tests    Follow up with primary care provider, Jennifer Dickerson, within 7-10 days for hospital follow- up.  No follow up labs or test are needed.  - Follow up with [facility] in 2 weeks with a repeat CT head.     Monitor and record   Clear runny discharge from the nose. This may be CSF leak if noted. Contact your PCP if noted.     Activity   Your activity upon discharge: activity as tolerated with no driving on opoid medications, no heavy lifting or straining.  Avoiding high pressure activities such as attempting to blow nose, equalize TM pressures, holding sneezes.     Full Code     Diet   Follow this diet upon discharge:      Regular Diet Adult       Discharge Medications   Current Discharge Medication List      START taking these medications    Details   amoxicillin-clavulanate (AUGMENTIN) 875-125 MG per tablet Take 1 tablet by mouth 2 times daily for 10 days  Qty: 20 tablet, Refills: 0    Associated Diagnoses: Closed fracture of frontal bone, initial encounter (H)      oxyCODONE IR (ROXICODONE) 5 MG tablet Take 1 tablet (5 mg) by mouth every 6 hours as needed for other (pain control or improvement in physical function.)  Qty: 12 tablet, Refills: 0    Associated Diagnoses: Closed fracture of frontal bone, initial encounter (H)         CONTINUE these medications which have NOT CHANGED    Details   ALPRAZolam (XANAX) 0.25 MG tablet Take 1 tablet (0.25 mg) by mouth 3 times daily as needed for anxiety  Qty: 30 tablet, Refills: 0    Associated Diagnoses: Panic disorder without agoraphobia      amLODIPine (NORVASC) 5 MG tablet Take 1 tablet (5 mg) by mouth daily  Qty: 90 tablet, Refills: 3    Associated Diagnoses: Essential hypertension, benign      Boswellia-Glucosamine-Vit D (GLUCOSAMINE COMPLEX PO) Take 2 tablets by mouth At Bedtime       busPIRone (BUSPAR) 10 MG tablet TAKE ONE TABLET BY MOUTH TWICE DAILY   Qty: 60 tablet, Refills: 8    Associated Diagnoses: Panic disorder without agoraphobia      Calcium-Magnesium-Vitamin D 500-250-200 MG-MG-UNIT TABS Take 2 tablets by mouth At Bedtime      DULoxetine (CYMBALTA) 60 MG EC capsule Take 1 capsule (60 mg) by mouth daily  Qty:  90 capsule, Refills: 3    Associated Diagnoses: Panic disorder without agoraphobia; Other headache syndrome      LANsoprazole (PREVACID) 30 MG CR capsule Take 30 mg by mouth At Bedtime      lisinopril (PRINIVIL/ZESTRIL) 40 MG tablet Take 1 tablet (40 mg) by mouth daily  Qty: 30 tablet, Refills: 0    Comments: Needs to follow-up for BP recheck  Associated Diagnoses: Essential hypertension, benign      MULTIVITAMIN TABS   OR       orphenadrine (NORFLEX) 100 MG 12 hr tablet Take 100 mg by mouth 2 times daily as needed for muscle spasms      simvastatin (ZOCOR) 40 MG tablet Take 1 tablet (40 mg) by mouth At Bedtime  Qty: 90 tablet, Refills: 3    Associated Diagnoses: Hyperlipidemia LDL goal <160         STOP taking these medications       naproxen (NAPROSYN) 500 MG tablet Comments:   Reason for Stopping:             Allergies   Allergies   Allergen Reactions     Gabapentin      Pt feels out of it     Data   Most Recent 3 CBC's:  Recent Labs   Lab Test  06/28/18   2245  01/10/18   1238  02/06/11   1028   WBC  7.3  12.8*  5.8   HGB  12.4  14.6  14.1   MCV  97  91  87   PLT  365  364  309      Most Recent 3 BMP's:  Recent Labs   Lab Test  06/28/18   2245  06/06/18   1039  01/10/18   1238   NA  140  138  134   POTASSIUM  3.5  4.0  4.2   CHLORIDE  105  102  100   CO2  29  27  26   BUN  8  9  8   CR  0.63  0.60  0.57   ANIONGAP  6  9  8   CECILIA  8.7  8.7  9.2   GLC  126*  86  135*     Most Recent 2 LFT's:No lab results found.  Most Recent INR's and Anticoagulation Dosing History:  Anticoagulation Dose History     There is no flowsheet data to display.        Most Recent 3 Troponin's:No lab results found.  Most Recent Cholesterol Panel:  Recent Labs   Lab Test  06/06/18   1039   CHOL  175   LDL  100*   HDL  46*   TRIG  144     Most Recent 6 Bacteria Isolates From Any Culture (See EPIC Reports for Culture Details):  Recent Labs   Lab Test  01/10/18   1646  04/24/11   1615   CULT  No anaerobes isolated  No growth  Canceled, Test  credited  Incorrectly ordered by PCU/Clinic    >100,000 colonies/mL Escherichia coli     Most Recent TSH, T4 and A1c Labs:No lab results found.    Results for orders placed or performed during the hospital encounter of 06/28/18   Head CT w/o contrast    Narrative    CT HEAD W/O CONTRAST  6/28/2018 11:27 PM     HISTORY: Fall, head trauma.    TECHNIQUE: Axial images of the head and coronal reformations without  IV contrast material. Radiation dose for this scan was reduced using  automated exposure control, adjustment of the mA and/or kV according  to patient size, or iterative reconstruction technique.    COMPARISON: None.    FINDINGS: No intracranial hemorrhage, mass or mass effect. No acute  infarct identified. No shift of midline structures. There is a  nondisplaced left frontal bone fracture which extends through the left  frontal sinus and the left superior orbital rim. Minimal stranding and  a tiny gas bubble in the adjacent superior aspect of the left orbit.  Small amount of fluid and minimal blood in the left frontal sinus.      Impression    IMPRESSION:  1. Nondisplaced left frontal bone fracture. This extends through the  left frontal sinus and into the left superior orbital rim, at risk for  infection.  2. No intracranial hemorrhage. No other acute findings.    Findings called to the ER physician at 11:35 p.m.    LILI ARELLANO MD   Cervical spine CT w/o contrast    Narrative    CT CERVICAL SPINE W/O CONTRAST  6/28/2018 11:27 PM     HISTORY: Fall, midline neck pain, midline cervical spine pain.    TECHNIQUE: Axial images through cervical spine without contrast.  Sagittal and coronal reformatted images. Radiation dose for this scan  was reduced using automated exposure control, adjustment of the mA  and/or kV according to patient size, or iterative reconstruction  technique.    COMPARISON: None.    FINDINGS: No acute fractures or alignment abnormality. Degenerative  and hypertrophic changes in the lower  cervical spine with narrowing of  the C5-6 and C6-7 interspaces. Posterior ridging at several lower  levels and a mild disc bulge at C5-6 cause mild central canal  narrowing. No prevertebral soft tissue swelling or significant central  canal narrowing.      Impression    IMPRESSION: No fractures or other acute findings. Degenerative  changes.    LILI ARELLANO MD   Chest XR,  PA & LAT    Narrative    XR CHEST 2 VW  6/28/2018 11:36 PM     INDICATION: Fall, midsternal pain.    COMPARISON: 2/6/2011.      Impression    IMPRESSION: No infiltrates or other acute findings. Heart size is  within normal limits.    LILI ARELLANO MD   Thoracic spine XR, 3 views    Narrative    XR THORACIC SPINE 3 VW  6/28/2018 11:36 PM     INDICATION: Fall, midline thoracic pain.    COMPARISON: 3/13/2015.      Impression    IMPRESSION: Mild degenerative changes. Otherwise negative.    LILI ARELLANO MD

## 2018-06-29 NOTE — PHARMACY-ADMISSION MEDICATION HISTORY
Admission medication history interview status for the 6/29/2018  admission is complete. See EPIC admission navigator for prior to admission medications     Medication history source reliability:Good    Actions taken by pharmacist (provider contacted, etc):None     Additional medication history information not noted on PTA med list :has diazepam and oxycodone/acetaminophen for muscle spasms and pain on hand, but has not taken in over a month. I did not include them on her list.     Medication reconciliation/reorder completed by provider prior to medication history? No    Time spent in this activity: 25 minutes    Prior to Admission medications    Medication Sig Last Dose Taking? Auth Provider   ALPRAZolam (XANAX) 0.25 MG tablet Take 1 tablet (0.25 mg) by mouth 3 times daily as needed for anxiety  Yes Jennifer Dickerson MD   amLODIPine (NORVASC) 5 MG tablet Take 1 tablet (5 mg) by mouth daily  at pm Yes Jennifer Dickerson MD   Boswellia-Glucosamine-Vit D (GLUCOSAMINE COMPLEX PO) Take 2 tablets by mouth At Bedtime   Yes Reported, Patient   busPIRone (BUSPAR) 10 MG tablet TAKE ONE TABLET BY MOUTH TWICE DAILY   Yes Jennifer Dickerson MD   Calcium-Magnesium-Vitamin D 500-250-200 MG-MG-UNIT TABS Take 2 tablets by mouth At Bedtime  Yes Unknown, Entered By History   DULoxetine (CYMBALTA) 60 MG EC capsule Take 1 capsule (60 mg) by mouth daily  at pm Yes Jennifer Dickerson MD   LANsoprazole (PREVACID) 30 MG CR capsule Take 30 mg by mouth At Bedtime  Yes Unknown, Entered By History   lisinopril (PRINIVIL/ZESTRIL) 40 MG tablet Take 1 tablet (40 mg) by mouth daily  at pm Yes Jennifer Dickerson MD   MULTIVITAMIN TABS   OR   at pm Yes    naproxen (NAPROSYN) 500 MG tablet Take 1 tablet (500 mg) by mouth 2 times daily as needed for moderate pain  Yes Jennifer Dickerson MD   orphenadrine (NORFLEX) 100 MG 12 hr tablet Take 100 mg by mouth 2 times daily as needed for muscle spasms  Yes Unknown, Entered By History   simvastatin (ZOCOR) 40 MG tablet Take 1  tablet (40 mg) by mouth At Bedtime  Yes Jennifer Dickerson MD

## 2018-06-29 NOTE — H&P
[facility]    History and Physical  Hospitalist       Date of Admission:  6/29/2018    Assessment & Plan   Mariah Mart is a 57 year old female who presents with a fall down 3 steps onto carpeted concrete found to have nondisplaced left frontal skull fracture.    Traumatic left frontal skull fracture: Involves superior aspect of left orbit, transverses left frontal sinus.  Somewhat concerning as this is patient's second fall in approximately 1 month.  First fall resulted in left elbow dislocation/fracture.  Patient denies syncope.  States that this fall occurred as she was attempting to rush down the stairs.  -Fall precautions  -Ambulate with nursing staff  -Neurosurgery consulted for trauma evaluation  -Unasyn every 6 hours, anticipate transition to Augmentin when cleared by neurosurgery  -Patient counseled on avoiding high pressure activities such as attempting to blow nose, equalize TM pressures, holding sneezes.  -Cardiac telemetry  -Every 4 hours neuro checks as per neurosurgery recommendations  -Monitor for evidence of CSF leak.  Patient counseled on this as well.  -Aspirin held.  Anticipate resumption of discharge    Left proximal olecranon fracture with dorsal dislocation of ulna and radius: Following a mechanical fall as she stumbled in the hallway 5/23/18.  Has followed with [facility] orthopedics, is increasing her range of motion at home without assistance of physical therapy.  -Continue outpatient ranging regimen.    GERD:  -Consider continuing Prevacid, though anticipate brief hospitalization with discharge later this morning.    Hypertension:  -Prior to admission amlodipine, lisinopril held given observation status, resume at discharge    Hyperlipidemia:  -Continue Zocor 40 mg at discharge    Generalized anxiety disorder, depression:  -Resume Cymbalta, BuSpar at discharge    # Pain Assessment:  Current Pain Score 6/29/2018   Patient currently in pain? yes   Pain score (0-10) 6    Pain location Head   Pain descriptors Luiza   - Mariah is experiencing pain due to traumatic frontal skull fracture. Pain management was discussed and the plan was created in a collaborative fashion.  Mariah's response to the current recommendations: Engaged  Acetaminophen, oxycodone.  IV Dilaudid if needed, though attempt to minimize anticipating discharge.    DVT Prophylaxis: Ambulate every shift    Code Status: Full Code    Disposition: Expected discharge 6/29/18 a.m. versus early afternoon pending neurosurgical clearance.    [facility]    Primary Care Physician   Jennifer Dickerson    Chief Complaint   Headache, fall    History is obtained from the patient, chart review, discussion with Dr. Rucker at [facility], review of recent [facility] records    History of Present Illness   Mariah Mart is a 57 year old female who presents with acute traumatic left frontal skull fracture.  Patient was at home, states that she was rushing down her stairs to get something when she stumbled and fell down approximately 3 steps.  Patient states that she fell onto her basement floor which is cement with padding and carpet.  Patient did not have time to catch herself with her arm, states that she intentionally did not attempt to reach out with her left arm as she recently had a left elbow dislocation following a fall in the hallway.  Patient otherwise denies history or issues with falls.  Patient states that she did not lose consciousness, no prodrome preceding fall.    Patient presented to [facility] for evaluation.  Patient with some chest wall pain as well as midline spinal tenderness.   cervical and thoracic spine cleared by ED personnel with imaging and exam.  Found to have left frontal skull fracture on head CT without evidence of subdural.  Neurosurgery was contacted and recommended overnight observation.  Hospitalist requested transfer to [facility] in case of need for urgent/emergent  neurosurgical intervention.  Patient subsequently transferred to [facility].  Initially, patient declined admission as she needed to arrange transport for her 2 children to get to ENT clinic.  Children are 4 year old twins, have tracheostomies, were due for bronchoscopies and PE tube placements.  After she was able to arrange transport, however, patient amenable to recommended observation.      No fevers, No chills.  Patient endorses dry mouth.  Slightly thickened speech, which patient attributes to doses of IV Dilaudid at outside hospital.    Patient describes a bitemporal headache going across the front of her skull.  No other neurologic deficits.  No jaw instability.    Past Medical History    I have reviewed this patient's medical history and updated it with pertinent information if needed.   Past Medical History:   Diagnosis Date     Essential hypertension, benign      Hypopotassemia     on diuretics     Internal hemorrhoids     ligated     Mixed hyperlipidemia      Obesity 2010       Past Surgical History   I have reviewed this patient's surgical history and updated it with pertinent information if needed.  Past Surgical History:   Procedure Laterality Date     LAPAROSCOPIC APPENDECTOMY N/A 1/10/2018    Procedure: LAPAROSCOPIC APPENDECTOMY;  LAPAROSCOPIC APPENDECTOMY ;  Surgeon: Eugenia De La Rosa MD;  Location:  OR     TONSILLECTOMY & ADENOIDECTOMY          Prior to Admission Medications   Prior to Admission Medications   Prescriptions Last Dose Informant Patient Reported? Taking?   ALPRAZolam (XANAX) 0.25 MG tablet   No No   Sig: Take 1 tablet (0.25 mg) by mouth 3 times daily as needed for anxiety   ASPIRIN 81 MG OR TABS   No No   Si tab po QD (Once per day)   Boswellia-Glucosamine-Vit D (GLUCOSAMINE COMPLEX PO)   Yes No   Sig: Take 2 tablets by mouth At Bedtime    Calcium-Magnesium-Vitamin D 500-250-200 MG-MG-UNIT TABS   Yes No   Sig: Take 2 tablets by mouth At Bedtime    DULoxetine (CYMBALTA) 60 MG EC capsule   No No   Sig: Take 1 capsule (60 mg) by mouth daily   LANsoprazole (PREVACID) 30 MG CR capsule   Yes No   Sig: Take 30 mg by mouth At Bedtime   MULTIVITAMIN TABS   OR   No No   Patient taking differently: 1 tab at HS   amLODIPine (NORVASC) 5 MG tablet   No No   Sig: Take 1 tablet (5 mg) by mouth daily   busPIRone (BUSPAR) 10 MG tablet   No No   Sig: TAKE ONE TABLET BY MOUTH TWICE DAILY    lisinopril (PRINIVIL/ZESTRIL) 40 MG tablet   No No   Sig: Take 1 tablet (40 mg) by mouth daily   naproxen (NAPROSYN) 500 MG tablet   No No   Sig: Take 1 tablet (500 mg) by mouth 2 times daily as needed for moderate pain   oxyCODONE-acetaminophen (PERCOCET) 5-325 MG per tablet   No No   Sig: Take 1-2 tablets by mouth every 6 hours as needed for moderate to severe pain   simvastatin (ZOCOR) 40 MG tablet   No No   Sig: Take 1 tablet (40 mg) by mouth At Bedtime      Facility-Administered Medications: None     Allergies   Allergies   Allergen Reactions     Gabapentin      Pt feels out of it       Social History   I have reviewed this patient's social history and updated it with pertinent information if needed. Mariah MARIE Mart  reports that she has never smoked. She has never used smokeless tobacco. She reports that she drinks alcohol. She reports that she does not use illicit drugs.        Family History   I have reviewed this patient's family history and updated it with pertinent information if needed.   Family History   Problem Relation Age of Onset     Hypertension Mother      HEART DISEASE Mother      MI  at 52     HEART DISEASE Father      : CABG     Respiratory Father      COPD     Hypertension Father      Hypertension Brother      Genitourinary Problems Sister      interstital cystitis     Breast Cancer No family hx of      Cancer - colorectal No family hx of      Diabetes No family hx of         Review of Systems   The 10 point Review of Systems is negative other than  "noted in the HPI or here.  No fevers    Physical Exam   Temp: 97.9  F (36.6  C) Temp src: Oral BP: 128/81   Heart Rate: 92   SpO2: 100 % O2 Device: Nasal cannula Oxygen Delivery: 1 LPM  Vital Signs with Ranges  Temp:  [97.9  F (36.6  C)-98.1  F (36.7  C)] 97.9  F (36.6  C)  Pulse:  [88] 88  Heart Rate:  [88-93] 92  Resp:  [18] 18  BP: (128-142)/(79-81) 128/81  SpO2:  [95 %-100 %] 100 %  0 lbs 0 oz    Constitutional: no acute distress, alert, conversant  Eyes: no scleral icterus or injection  HEENT: moist mucous membranes.  Extraocular motions intact.  Respiratory: breath sounds clear bilaterally to auscultation, no wheezes, no crackles  Cardiovascular: regular rate and rhythm, soft 2/6 Systolic murmur at apex, left upper sternal border.    GI: abdomen soft, non-tender, normoactive bowel sounds, no masses  Lymph/Hematologic: no lower extremity swelling  Genitourinary: not examined  Skin: Mild periorbital ecchymosis of left medial orbit.  Slight erythema/ecchymosis of left frontal scalp.  Musculoskeletal: muscular tone intact in all extremities  Neurologic: mental status grossly intact, no focal deficits, alert.  Speech is slightly \"thick,\" no word finding difficulty  Psychiatric: normal affect    Data   Data reviewed today:  I personally reviewed the CT head image(s) showing Left frontal skull fracture transversing frontal sinus..    Recent Labs  Lab 06/28/18  2245   WBC 7.3   HGB 12.4   MCV 97         POTASSIUM 3.5   CHLORIDE 105   CO2 29   BUN 8   CR 0.63   ANIONGAP 6   CECILIA 8.7   *       Recent Results (from the past 24 hour(s))   Head CT w/o contrast    Narrative    CT HEAD W/O CONTRAST  6/28/2018 11:27 PM     HISTORY: Fall, head trauma.    TECHNIQUE: Axial images of the head and coronal reformations without  IV contrast material. Radiation dose for this scan was reduced using  automated exposure control, adjustment of the mA and/or kV according  to patient size, or iterative reconstruction " technique.    COMPARISON: None.    FINDINGS: No intracranial hemorrhage, mass or mass effect. No acute  infarct identified. No shift of midline structures. There is a  nondisplaced left frontal bone fracture which extends through the left  frontal sinus and the left superior orbital rim. Minimal stranding and  a tiny gas bubble in the adjacent superior aspect of the left orbit.  Small amount of fluid and minimal blood in the left frontal sinus.      Impression    IMPRESSION:  1. Nondisplaced left frontal bone fracture. This extends through the  left frontal sinus and into the left superior orbital rim, at risk for  infection.  2. No intracranial hemorrhage. No other acute findings.    Findings called to the ER physician at 11:35 p.m.    LILI ARELLANO MD   Cervical spine CT w/o contrast    Narrative    CT CERVICAL SPINE W/O CONTRAST  6/28/2018 11:27 PM     HISTORY: Fall, midline neck pain, midline cervical spine pain.    TECHNIQUE: Axial images through cervical spine without contrast.  Sagittal and coronal reformatted images. Radiation dose for this scan  was reduced using automated exposure control, adjustment of the mA  and/or kV according to patient size, or iterative reconstruction  technique.    COMPARISON: None.    FINDINGS: No acute fractures or alignment abnormality. Degenerative  and hypertrophic changes in the lower cervical spine with narrowing of  the C5-6 and C6-7 interspaces. Posterior ridging at several lower  levels and a mild disc bulge at C5-6 cause mild central canal  narrowing. No prevertebral soft tissue swelling or significant central  canal narrowing.      Impression    IMPRESSION: No fractures or other acute findings. Degenerative  changes.    LILI ARELLANO MD   Chest XR,  PA & LAT    Narrative    XR CHEST 2 VW  6/28/2018 11:36 PM     INDICATION: Fall, midsternal pain.    COMPARISON: 2/6/2011.      Impression    IMPRESSION: No infiltrates or other acute findings. Heart size is  within  normal limits.    LILI ARELLANO MD   Thoracic spine XR, 3 views    Narrative    XR THORACIC SPINE 3 VW  6/28/2018 11:36 PM     INDICATION: Fall, midline thoracic pain.    COMPARISON: 3/13/2015.      Impression    IMPRESSION: Mild degenerative changes. Otherwise negative.    LILI ARELLANO MD

## 2018-06-29 NOTE — ED TRIAGE NOTES
Pt fell from 3rd step at home, carpeted stairs and floor, now complaint of head/neck/back pain.  Denies LOC.  Placed in c-collar by EMS

## 2018-06-29 NOTE — PROGRESS NOTES
Call back from Dr Varner at [facility] ED and patient is refusing to stay, and refusing to sign AMA paperwork.  Dr. Rucker reviewed with patient our recommendation to stay for observation overnight and IV antibiotics and neuro checks. She states she has nurses at home who can do neuro checks and her children have ENT appts in the am.  Dr. Rucker will prescribe oral antibiotic x7days and stress importance to schedule f/u in 1-2 weeks at  [facility] with CT of head prior.  Patient to return to ED ASAP if severe HA, fever and/or notices drainage from nose (possible CSF).

## 2018-06-29 NOTE — PLAN OF CARE
"Problem: Patient Care Overview  Goal: Plan of Care/Patient Progress Review  Outcome: No Change  Direct transfer from [facility], arrived to unit at approx. 0320. A& O x4. Neuros intact. CMS intact. VSS. Regular diet. Up with assist x1. Reports headache, pain \"6/10\". Decreased with 5mg PRN oxycodone.  Plan to continue IV antibiotics and to have neurology consult. Continue monitoring.      "

## 2018-06-29 NOTE — PLAN OF CARE
Alert and oriented x4. VSS. Pain controled with oxycodone. Up with SBA. No sign of CSF leak. Discharge paperwork gone over with pt and questions answered. Sent with paperwork and belongings.

## 2018-06-29 NOTE — ED PROVIDER NOTES
History     Chief Complaint:  Fall    HPI   Mariah Mart is a 57 year old female who presents to the emergency department today for evaluation of injuries following a fall.  The patient states about half an hour ago she fell forward down a few stairs. She states that she dislocated her left albow about a month ago, and protected the arm but hit her head.  Following the fall, she reports that she crawled to a couch to lay down and call 911 after someone brought her her phone. The patient reports that her pain worst in top half of her head, neck, thoracic spine, and sternum. The patient denies any numbness or tingling or teeth feeling out of place. The patient's ;ast tetanus shot was 11/29/2016.    Allergies:  Gabapentin     Medications:    Alprazolam     Amlodipine     Boswellia-Glucosamine-Vit D  Buspirone     Duloxetine    Lansoprazole    Lisinopril    Naproxen   Oxycodone -acetaminophen  Simvastatin     Past Medical History:    Essential hypertension, benign   Hypopotassemia   Internal hemorrhoids   Mixed hyperlipidemia   Obesity     Past Surgical History:    Appendectomy  Tonsillectomy & adenoidectomy     Family History:    Mother: hypertension, heart disease, myocardial infarction  Father: heart disease, COPD, hypertension  Brother: hypertension  Sister: interstitial cystitis     Social History:  Smoking Status: Never Smoker  Smokeless Tobacco: Never Used  Alcohol Use: Positive   Marital Status:  Single      Review of Systems   HENT: Negative for dental problem, ear discharge and rhinorrhea.    Eyes: Negative for visual disturbance.   Respiratory: Negative for shortness of breath.    Cardiovascular: Positive for chest pain.   Gastrointestinal: Negative for abdominal pain.   Musculoskeletal: Positive for back pain and neck pain.        Sternum pain   Skin: Positive for wound.   Neurological: Positive for headaches.   Psychiatric/Behavioral: Negative for confusion.   All other systems reviewed and are  negative.    Physical Exam     Patient Vitals for the past 24 hrs:   BP Temp Temp src Pulse Heart Rate Resp SpO2   06/28/18 2239 142/79 98.1  F (36.7  C) Oral 88 88 18 98 %        Physical Exam  Nursing note and vitals reviewed.  Constitutional: Cooperative. In c-collar.   HENT:   Mouth/Throat: Moist mucous membranes.  Pain to palpation with focal swelling and abrasion over left forehead. No stepoffs. Orbit/nose stable.    Eyes: EOMI, nonicteric sclera  Cardiovascular: Normal rate, regular rhythm, no murmurs, rubs, or gallops  Pulmonary/Chest: Effort normal and breath sounds normal. No respiratory distress. No wheezes. No rales. Mild pain to palpation to anterior chest.   Abdominal: Soft. Nontender, nondistended, no guarding or rigidity. BS present.   Musculoskeletal: Normal range of motion. Midline neck tenderness through collar. After CT, c-collar removed and pt has full ROM without pain.   Neurological: Alert. Moves all extremities spontaneously. Equal strength BUE. Normal sensation bilaterally.   Skin: Skin is warm and dry. No rash noted.   Psychiatric: Normal mood and affect.       Emergency Department Course     Imaging:  Radiology findings were communicated with the patient who voiced understanding of the findings.    Thoracic spine XR, 3 views  Mild degenerative changes. Otherwise negative.  Imaging independently reviewed and agree with radiologist interpretation.      Chest XR,  PA & LAT  No infiltrates or other acute findings. Heart size is  within normal limits.  Imaging independently reviewed and agree with radiologist interpretation.      Head CT w/o contrast  1. Nondisplaced left frontal bone fracture. This extends through the  left frontal sinus and into the left superior orbital rim, at risk for  infection.  2. No intracranial hemorrhage. No other acute findings.  Findings called to the ER physician at 11:35 p.m.  Imaging independently reviewed and agree with radiologist interpretation.      Cervical  spine CT w/o contrast  No fractures or other acute findings. Degenerative  changes.  Imaging independently reviewed and agree with radiologist interpretation.      Laboratory:   Laboratory findings were communicated with the patient who voiced understanding of the findings.    CBC: WBC 7.3, HGB 12.4,   BMP: Glucose 126 (H) o/w WNL (creatinine 0.63)  Alcohol level blood: <0.01    Interventions:  2254 Zofran 4 mg IV  2256 Dilaudid 0.5 mg IV  2341 Dilaudid 0.5 mg IV  0017 Unasyn 3 g IV  0028 Dilaudid 0.5mg IV  0203 Dilaudid 0.5mg IV    Emergency Department Course:    2238 Nursing notes and vitals reviewed.    2245 I performed an exam of the patient as documented above.     2315 The patient was sent for a head CT while in the emergency department, results above.      2315 The patient was sent for a cervical spine CT while in the emergency department, results above.      2323 The patient was sent for a chest x-ray while in the emergency department, results above.      2323 The patient was sent for a thoracic spine x-ray while in the emergency department, results above.    2245 IV was inserted and blood was drawn for laboratory testing, results above.     2349  I spoke with Elba Martinez a nurse practitioner of the neurosurgery service regarding patient's presentation, findings, and plan of care.       0005  I again spoke with Elba Martinez after patient refused admission and signing AMA forms.     0012  I spoke with a hospitalist from [facility] regarding patient's presentation, findings, and plan of care.    0143  I spoke with Dr. Duggan  from [facility] regarding patient's presentation, findings, and plan of care.      Impression & Plan      Medical Decision Making:  Mariah Mart is a 57 year old female who presents to the emergency department today for evaluation of fall downstairs.  Patient's largest complaint is headache, as well as some neck pain.  Imaging notable for a nondisplaced left frontal skull  fracture.  No intracranial hemorrhage.  On my read, a very small amount of pneumocephalus.  Fracture goes through the sinus.  No CSF leak.  Unasyn given here for coverage against microbes in the sinuses.  This was discussed with neurosurgery who agrees.  On initial discussion with neurosurgery, they recommended that patient be admitted to the hospital for observation.  Patient initially declined this and wanted to be discharged home.  I then called neurosurgery back and explained this, and they stated that they would recommend AMA in that scenario and oral antibiotics with a one-week outpatient follow-up.  When I went to discuss this with the patient, she again changed her mind and wanted to be admitted again.  No other traumatic injuries noted on imaging or exam.  Spoke with our hospitalist here at [facility] who states that they are uncomfortable admitting patient, given neurosurgical consultation.  For that reason, I contacted [facility] and Dr. Duggan from the hospitalist service accepts.  Patient's pain controlled while here.  All her questions were answered and she is in agreement the plan.        Diagnosis:    ICD-10-CM    1. Closed fracture of frontal bone, initial encounter (H) S02.0XXA    2. Fall down stairs, initial encounter W10.8XXA    3. Neck pain M54.2      Disposition:   The patient is transferred and admitted into the care of Dr. Duggan.     Scribe Disclosure:  I, Erika Ronquillo, am serving as a scribe at 10:39 PM on 6/28/2018 to document services personally performed by Jose Rucker MD based on my observations and the provider's statements to me.     [facility] EMERGENCY DEPARTMENT       Jose Rucker MD  06/29/18 0516

## 2018-06-29 NOTE — IP AVS SNAPSHOT
MRN:[id number]                      After Visit Summary   6/29/2018    Mariah Mart    MRN: [id number]           Thank you!     Thank you for choosing [facility] for your care. Our goal is always to provide you with excellent care. Hearing back from our patients is one way we can continue to improve our services. Please take a few minutes to complete the written survey that you may receive in the mail after you visit with us. Thank you!        Patient Information     Date Of Birth          1960        Designated Caregiver       Most Recent Value    Caregiver    Will someone help with your care after discharge? no      About your hospital stay     You were admitted on:  June 29, 2018 You last received care in the:  [place] [facility] [address] [facility]    You were discharged on:  June 29, 2018       Who to Call     For medical emergencies, please call 911.  For non-urgent questions about your medical care, please call your primary care provider or clinic, [phone]          Attending Provider     Provider Specialty    Duggan, Ramirez Arciniega MD Internal Medicine       Primary Care Provider Office Phone # Fax #    Jennifer Dickerson [phone]      After Care Instructions     Activity       Your activity upon discharge: activity as tolerated with no driving on opoid medications, no heavy lifting or straining.  Avoiding high pressure activities such as attempting to blow nose, equalize TM pressures, holding sneezes.            Diet       Follow this diet upon discharge:       Regular Diet Adult            Monitor and record       Clear runny discharge from the nose. This may be CSF leak if noted. Contact your PCP if noted.                  Follow-up Appointments     Follow-up and recommended labs and tests        Follow up with primary care provider, Jennifer Dickerson, within 7-10 days for hospital follow- up.  No follow up labs or test are needed.   - Follow up with [facility] in 2 weeks with a repeat CT head.                  Pending Results     No orders found from 2018 to 2018.            Statement of Approval     Ordered          18 1149  I have reviewed and agree with all the recommendations and orders detailed in this document.  EFFECTIVE NOW     Approved and electronically signed by:  Merlin Irwin MD             Admission Information     Date & Time Provider Department Dept. Phone    2018 Urban, Ramirez Arciniega MD [address] [facility] [phone]      Your Vitals Were     Blood Pressure Temperature Respirations Last Period Pulse Oximetry       117/71 (BP Location: Left arm) 97.8  F (36.6  C) (Oral) 16 2007 99%       MyChart Information     [facility] lets you send messages to your doctor, view your test results, renew your prescriptions, schedule appointments and more. To sign up, go to [email or web address] . Click on \"Log in\" on the left side of the screen, which will take you to the Welcome page. Then click on \"Sign up Now\" on the right side of the page.     You will be asked to enter the access code listed below, as well as some personal information. Please follow the directions to create your username and password.     Your access code is: [email or web address]  Expires: 2018  9:43 PM     Your access code will  in 90 days. If you need help or a new code, please call your [facility] clinic or [phone].        Care EveryWhere ID     This is your Care EveryWhere ID. This could be used by other organizations to access your [facility] medical records  [id number]        Equal Access to Services     [facility]: Hadii ruba carcamo Sobelgica, waaxda luqadaha, qaybta kaalmapaulina baird. So [facility] [phone].    ATENCIÓN: Si habla español, tiene a headley disposición servicios gratuitos de asistencia lingüística. Llame al [phone].    We comply with applicable federal " civil rights laws and [place] laws. We do not discriminate on the basis of race, color, national origin, age, disability, sex, sexual orientation, or gender identity.               Review of your medicines      START taking        Dose / Directions    amoxicillin-clavulanate 875-125 MG per tablet   Commonly known as:  AUGMENTIN        Dose:  1 tablet   Take 1 tablet by mouth 2 times daily for 10 days   Quantity:  20 tablet   Refills:  0       oxyCODONE IR 5 MG tablet   Commonly known as:  ROXICODONE        Dose:  5 mg   Take 1 tablet (5 mg) by mouth every 6 hours as needed for other (pain control or improvement in physical function.)   Quantity:  12 tablet   Refills:  0         CONTINUE these medicines which may have CHANGED, or have new prescriptions. If we are uncertain of the size of tablets/capsules you have at home, strength may be listed as something that might have changed.        Dose / Directions    MULTIVITAMIN TABS   OR   This may have changed:  See the new instructions.        Refills:  0         CONTINUE these medicines which have NOT CHANGED        Dose / Directions    ALPRAZolam 0.25 MG tablet   Commonly known as:  XANAX   Used for:  Panic disorder without agoraphobia        Dose:  0.25 mg   Take 1 tablet (0.25 mg) by mouth 3 times daily as needed for anxiety   Quantity:  30 tablet   Refills:  0       amLODIPine 5 MG tablet   Commonly known as:  NORVASC   Used for:  Essential hypertension, benign        Take 1 tablet (5 mg) by mouth daily   Quantity:  90 tablet   Refills:  3       busPIRone 10 MG tablet   Commonly known as:  BUSPAR   Used for:  Panic disorder without agoraphobia        TAKE ONE TABLET BY MOUTH TWICE DAILY   Quantity:  60 tablet   Refills:  8       Calcium-Magnesium-Vitamin D 500-250-200 MG-MG-UNIT Tabs        Dose:  2 tablet   Take 2 tablets by mouth At Bedtime   Refills:  0       DULoxetine 60 MG EC capsule   Commonly known as:  CYMBALTA   Used for:  Panic disorder without  agoraphobia, Other headache syndrome        Dose:  60 mg   Take 1 capsule (60 mg) by mouth daily   Quantity:  90 capsule   Refills:  3       GLUCOSAMINE COMPLEX PO        Dose:  2 tablet   Take 2 tablets by mouth At Bedtime   Refills:  0       LANsoprazole 30 MG CR capsule   Commonly known as:  PREVACID        Dose:  30 mg   Take 30 mg by mouth At Bedtime   Refills:  0       lisinopril 40 MG tablet   Commonly known as:  PRINIVIL/ZESTRIL   Used for:  Essential hypertension, benign        Take 1 tablet (40 mg) by mouth daily   Quantity:  30 tablet   Refills:  0       orphenadrine 100 MG 12 hr tablet   Commonly known as:  NORFLEX        Dose:  100 mg   Take 100 mg by mouth 2 times daily as needed for muscle spasms   Refills:  0       simvastatin 40 MG tablet   Commonly known as:  ZOCOR   Used for:  Hyperlipidemia LDL goal <160        Take 1 tablet (40 mg) by mouth At Bedtime   Quantity:  90 tablet   Refills:  3         STOP taking     naproxen 500 MG tablet   Commonly known as:  NAPROSYN                Where to get your medicines      These medications were sent to [id number] [place] [address]     Phone:  [phone]     amoxicillin-clavulanate 875-125 MG per tablet         Some of these will need a paper prescription and others can be bought over the counter. Ask your nurse if you have questions.     Bring a paper prescription for each of these medications     oxyCODONE IR 5 MG tablet                Protect others around you: Learn how to safely use, store and throw away your medicines at www.disposemymeds.org.        ANTIBIOTIC INSTRUCTION     You've Been Prescribed an Antibiotic - Now What?  Your healthcare team thinks that you or your loved one might have an infection. Some infections can be treated with antibiotics, which are powerful, life-saving drugs. Like all medications, antibiotics have side effects and should only be used when necessary.  There are some important things you should know about your antibiotic treatment.      Your healthcare team may run tests before you start taking an antibiotic.    Your team may take samples (e.g., from your blood, urine or other areas) to run tests to look for bacteria. These test can be important to determine if you need an antibiotic at all and, if you do, which antibiotic will work best.      Within a few days, your healthcare team might change or even stop your antibiotic.    Your team may start you on an antibiotic while they are working to find out what is making you sick.    Your team might change your antibiotic because test results show that a different antibiotic would be better to treat your infection.    In some cases, once your team has more information, they learn that you do not need an antibiotic at all. They may find out that you don't have an infection, or that the antibiotic you're taking won't work against your infection. For example, an infection caused by a virus can't be treated with antibiotics. Staying on an antibiotic when you don't need it is more likely to be harmful than helpful.      You may experience side effects from your antibiotic.    Like all medications, antibiotics have side effects. Some of these can be serious.    Let you healthcare team know if you have any known allergies when you are admitted to the hospital.    One significant side effect of nearly all antibiotics is the risk of severe and sometimes deadly diarrhea caused by Clostridium difficile (C. Difficile). This occurs when a person takes antibiotics because some good germs are destroyed. Antibiotic use allows C. diificile to take over, putting patients at high risk for this serious infection.    As a patient or caregiver, it is important to understand your or your loved one's antibiotic treatment. It is especially important for caregivers to speak up when patients can't speak for themselves. Here are some important  questions to ask your healthcare team.    What infection is this antibiotic treating and how do you know I have that infection?    What side effects might occur from this antibiotic?    How long will I need to take this antibiotic?    Is it safe to take this antibiotic with other medications or supplements (e.g., vitamins) that I am taking?     Are there any special directions I need to know about taking this antibiotic? For example, should I take it with food?    How will I be monitored to know whether my infection is responding to the antibiotic?    What tests may help to make sure the right antibiotic is prescribed for me?      Information provided by:  www.cdc.gov/getsmart  U.S. Department of Health and Human Services  Centers for disease Control and Prevention  National Center for Emerging and Zoonotic Infectious Diseases  Division of Healthcare Quality Promotion        Information about OPIOIDS     PRESCRIPTION OPIOIDS: WHAT YOU NEED TO KNOW   We gave you an opioid (narcotic) pain medicine. It is important to manage your pain, but opioids are not always the best choice. You should first try all the other options your care team gave you. Take this medicine for as short a time (and as few doses) as possible.     These medicines have risks:    DO NOT drive when on new or higher doses of pain medicine. These medicines can affect your alertness and reaction times, and you could be arrested for driving under the influence (DUI). If you need to use opioids long-term, talk to your care team about driving.    DO NOT operate heave machinery    DO NOT do any other dangerous activities while taking these medicines.     DO NOT drink any alcohol while taking these medicines.      If the opioid prescribed includes acetaminophen, DO NOT take with any other medicines that contain acetaminophen. Read all labels carefully. Look for the word  acetaminophen  or  Tylenol.  Ask your pharmacist if you have questions or are  unsure.    You can get addicted to pain medicines, especially if you have a history of addiction (chemical, alcohol or substance dependence). Talk to your care team about ways to reduce this risk.    Store your pills in a secure place, locked if possible. We will not replace any lost or stolen medicine. If you don t finish your medicine, please throw away (dispose) as directed by your pharmacist. The [place] Pollution Control Agency has more information about safe disposal: https://www.pca.[facility].[place].us/living-green/managing-unwanted-medications.     All opioids tend to cause constipation. Drink plenty of water and eat foods that have a lot of fiber, such as fruits, vegetables, prune juice, apple juice and high-fiber cereal. Take a laxative (Miralax, milk of magnesia, Colace, Senna) if you don t move your bowels at least every other day.              Medication List: This is a list of all your medications and when to take them. Check marks below indicate your daily home schedule. Keep this list as a reference.      Medications           Morning Afternoon Evening Bedtime As Needed    ALPRAZolam 0.25 MG tablet   Commonly known as:  XANAX   Take 1 tablet (0.25 mg) by mouth 3 times daily as needed for anxiety                                   amLODIPine 5 MG tablet   Commonly known as:  NORVASC   Take 1 tablet (5 mg) by mouth daily                                   amoxicillin-clavulanate 875-125 MG per tablet   Commonly known as:  AUGMENTIN   Take 1 tablet by mouth 2 times daily for 10 days                                      busPIRone 10 MG tablet   Commonly known as:  BUSPAR   TAKE ONE TABLET BY MOUTH TWICE DAILY                                      Calcium-Magnesium-Vitamin D 500-250-200 MG-MG-UNIT Tabs   Take 2 tablets by mouth At Bedtime                                   DULoxetine 60 MG EC capsule   Commonly known as:  CYMBALTA   Take 1 capsule (60 mg) by mouth daily                                   GLUCOSAMINE  COMPLEX PO   Take 2 tablets by mouth At Bedtime                                   LANsoprazole 30 MG CR capsule   Commonly known as:  PREVACID   Take 30 mg by mouth At Bedtime                                   lisinopril 40 MG tablet   Commonly known as:  PRINIVIL/ZESTRIL   Take 1 tablet (40 mg) by mouth daily                                   MULTIVITAMIN TABS   OR                                orphenadrine 100 MG 12 hr tablet   Commonly known as:  NORFLEX   Take 100 mg by mouth 2 times daily as needed for muscle spasms                                   oxyCODONE IR 5 MG tablet   Commonly known as:  ROXICODONE   Take 1 tablet (5 mg) by mouth every 6 hours as needed for other (pain control or improvement in physical function.)   Last time this was given:  5 mg on 6/29/2018 10:28 AM                                   simvastatin 40 MG tablet   Commonly known as:  ZOCOR   Take 1 tablet (40 mg) by mouth At Bedtime

## 2018-06-29 NOTE — ED NOTES
Bed: ED04  Expected date: 6/28/18  Expected time: 10:17 PM  Means of arrival: Ambulance  Comments:  teresa 598  58yo F

## 2018-06-29 NOTE — IP AVS SNAPSHOT
[address] [facility]    [facility] [address]    [place] [address]    Phone:  [phone]                                       After Visit Summary   6/29/2018    Mariah Mart    MRN: [id number]           After Visit Summary Signature Page     I have received my discharge instructions, and my questions have been answered. I have discussed any challenges I see with this plan with the nurse or doctor.    ..........................................................................................................................................  Patient/Patient Representative Signature      ..........................................................................................................................................  Patient Representative Print Name and Relationship to Patient    ..................................................               ................................................  Date                                            Time    ..........................................................................................................................................  Reviewed by Signature/Title    ...................................................              ..............................................  Date                                                            Time

## 2018-06-29 NOTE — PROGRESS NOTES
Page from Dr. Rucker at [facility] ED.  Patient presented to ED after falling down 3 stairs onto her face.  CT of head shows nondisplaced left frontal bone fracture extending through left frontal sinus into the left superior orbital rim.  No ICH.  Per Dr. Rucker patient neurologically intact, c/o mild headache no open laceration.  Plan to for IV antibiotics, admit thru hospitalist for overnight observation, will consult in the morning.

## 2018-07-02 ENCOUNTER — OFFICE VISIT (OUTPATIENT)
Dept: PEDIATRICS | Facility: CLINIC | Age: 58
End: 2018-07-02
Payer: COMMERCIAL

## 2018-07-02 ENCOUNTER — TELEPHONE (OUTPATIENT)
Dept: PEDIATRICS | Facility: CLINIC | Age: 58
End: 2018-07-02

## 2018-07-02 ENCOUNTER — PATIENT OUTREACH (OUTPATIENT)
Dept: CARE COORDINATION | Facility: CLINIC | Age: 58
End: 2018-07-02

## 2018-07-02 VITALS
OXYGEN SATURATION: 98 % | WEIGHT: 171 LBS | BODY MASS INDEX: 30.29 KG/M2 | DIASTOLIC BLOOD PRESSURE: 74 MMHG | HEART RATE: 108 BPM | TEMPERATURE: 98.3 F | SYSTOLIC BLOOD PRESSURE: 122 MMHG

## 2018-07-02 DIAGNOSIS — M54.50 CHRONIC BILATERAL LOW BACK PAIN WITHOUT SCIATICA: ICD-10-CM

## 2018-07-02 DIAGNOSIS — G89.29 CHRONIC BILATERAL LOW BACK PAIN WITHOUT SCIATICA: ICD-10-CM

## 2018-07-02 DIAGNOSIS — S02.0XXD CLOSED FRACTURE OF FRONTAL BONE WITH ROUTINE HEALING, SUBSEQUENT ENCOUNTER: ICD-10-CM

## 2018-07-02 DIAGNOSIS — R29.6 RECURRENT FALLS: ICD-10-CM

## 2018-07-02 DIAGNOSIS — F41.0 PANIC DISORDER WITHOUT AGORAPHOBIA: ICD-10-CM

## 2018-07-02 DIAGNOSIS — Z09 HOSPITAL DISCHARGE FOLLOW-UP: Primary | ICD-10-CM

## 2018-07-02 PROCEDURE — 99495 TRANSJ CARE MGMT MOD F2F 14D: CPT | Performed by: INTERNAL MEDICINE

## 2018-07-02 RX ORDER — ALPRAZOLAM 0.25 MG
0.25 TABLET ORAL 3 TIMES DAILY PRN
Qty: 30 TABLET | Refills: 0 | Status: SHIPPED | OUTPATIENT
Start: 2018-07-02 | End: 2019-03-12

## 2018-07-02 NOTE — PATIENT INSTRUCTIONS
For pain:  -- Will prescribe topical NSAIDS (voltaren gel)  -- In addition, can try lidocaine patch (OTC) or salonpas patch (without voltaren gel)  -- Follow-up with Dr. Dickerson if pain continues to be poorly controlled    Sleep:  -- Will refill xanax per documentation with PCP  -- I also recommend 10 mg melatonin 30 minutes prior to bedtime

## 2018-07-02 NOTE — TELEPHONE ENCOUNTER
Please contact patient for Emergency Department follow up.  [phone] (home) NONE (work)    Visit date: 06/29/2018  Diagnosis listed:Closed Fracture Of Frontal Bone, Initial Encounter (H)  Number of visits in past 12 months:2 ED/ 0 IP

## 2018-07-02 NOTE — MR AVS SNAPSHOT
After Visit Summary   7/2/2018    Mariah Mart    MRN: [id number]           Patient Information     Date Of Birth          1960        Visit Information        Provider Department      7/2/2018 4:10 PM Nara Rodriguez Mai, MD [facility]        Today's Diagnoses     Panic disorder without agoraphobia          Care Instructions    For pain:  -- Will prescribe topical NSAIDS (voltaren gel)  -- In addition, can try lidocaine patch (OTC) or salonpas patch (without voltaren gel)  -- Follow-up with Dr. Dickerson if pain continues to be poorly controlled    Sleep:  -- Will refill xanax per documentation with PCP  -- I also recommend 10 mg melatonin 30 minutes prior to bedtime          Follow-ups after your visit        Follow-up notes from your care team     Return in about 6 months (around 1/2/2019) for Physical Exam.      Your next 10 appointments already scheduled     Jul 10, 2018  9:20 AM CDT   Return Visit with LAUREN Coyle CNP   [facility] ([facility] [place] [facility])    [address] [address] [address] [phone]              Who to contact     If you have questions or need follow up information about today's clinic visit or your schedule please contact [facility] directly at [phone].  Normal or non-critical lab and imaging results will be communicated to you by MyChart, letter or phone within 4 business days after the clinic has received the results. If you do not hear from us within 7 days, please contact the clinic through MyChart or phone. If you have a critical or abnormal lab result, we will notify you by phone as soon as possible.  Submit refill requests through [employer] or call your pharmacy and they will forward the refill request to us. Please allow 3 business days for your refill to be completed.          Additional Information About Your Visit        Care EveryWhere ID     This is your  Care EveryWhere ID. This could be used by other organizations to access your [facility] medical records  [id number]        Your Vitals Were     Pulse Temperature Last Period Pulse Oximetry BMI (Body Mass Index)       108 98.3  F (36.8  C) (Oral) 05/30/2007 98% 30.29 kg/m2        Blood Pressure from Last 3 Encounters:   07/02/18 122/74   06/29/18 117/71   06/28/18 142/79    Weight from Last 3 Encounters:   07/02/18 171 lb (77.6 kg)   05/23/18 163 lb (73.9 kg)   02/02/18 169 lb 14.4 oz (77.1 kg)              Today, you had the following     No orders found for display         Today's Medication Changes          These changes are accurate as of 7/2/18  4:53 PM.  If you have any questions, ask your nurse or doctor.               Start taking these medicines.        Dose/Directions    diclofenac 1 % Gel topical gel   Commonly known as:  VOLTAREN   Used for:  Panic disorder without agoraphobia   Started by:  Nara Rodriguez Mai, MD        Apply 4 grams to knees or 2 grams to hands four times daily using enclosed dosing card.   Quantity:  100 g   Refills:  1         These medicines have changed or have updated prescriptions.        Dose/Directions    MULTIVITAMIN TABS   OR   This may have changed:  See the new instructions.        Refills:  0            Where to get your medicines      These medications were sent to [facility] [address] [place] [address] [address] [place] [address]     Phone:  [phone]     diclofenac 1 % Gel topical gel         Some of these will need a paper prescription and others can be bought over the counter.  Ask your nurse if you have questions.     Bring a paper prescription for each of these medications     ALPRAZolam 0.25 MG tablet                Primary Care Provider Office Phone # Fax #    Jennifer Dickerson [phone] [address] [place] [address]        Equal Access to Services     MATTHIEU BLANCA AH: Ana Neff,  watamikoda luqadaha, qaybta kaalshavonne white, paulina hatfieldkathryn ah. So [facility] [phone].    ATENCIÓN: Si lilibeth gorman, tiene a headley disposición servicios gratuitos de asistencia lingüística. Antolin al [phone].    We comply with applicable federal civil rights laws and [place] laws. We do not discriminate on the basis of race, color, national origin, age, disability, sex, sexual orientation, or gender identity.            Thank you!     Thank you for choosing [facility]  for your care. Our goal is always to provide you with excellent care. Hearing back from our patients is one way we can continue to improve our services. Please take a few minutes to complete the written survey that you may receive in the mail after your visit with us. Thank you!             Your Updated Medication List - Protect others around you: Learn how to safely use, store and throw away your medicines at www.disposemymeds.org.          This list is accurate as of 7/2/18  4:53 PM.  Always use your most recent med list.                   Brand Name Dispense Instructions for use Diagnosis    ALPRAZolam 0.25 MG tablet    XANAX    30 tablet    Take 1 tablet (0.25 mg) by mouth 3 times daily as needed for anxiety    Panic disorder without agoraphobia       amLODIPine 5 MG tablet    NORVASC    90 tablet    Take 1 tablet (5 mg) by mouth daily    Essential hypertension, benign       amoxicillin-clavulanate 875-125 MG per tablet    AUGMENTIN    20 tablet    Take 1 tablet by mouth 2 times daily for 10 days    Closed fracture of frontal bone, initial encounter (H)       busPIRone 10 MG tablet    BUSPAR    60 tablet    TAKE ONE TABLET BY MOUTH TWICE DAILY    Panic disorder without agoraphobia       Calcium-Magnesium-Vitamin D 500-250-200 MG-MG-UNIT Tabs      Take 2 tablets by mouth At Bedtime        diclofenac 1 % Gel topical gel    VOLTAREN    100 g    Apply 4 grams to knees or 2 grams to hands four times daily using  enclosed dosing card.    Panic disorder without agoraphobia       DULoxetine 60 MG EC capsule    CYMBALTA    90 capsule    Take 1 capsule (60 mg) by mouth daily    Panic disorder without agoraphobia, Other headache syndrome       GLUCOSAMINE COMPLEX PO      Take 2 tablets by mouth At Bedtime        LANsoprazole 30 MG CR capsule    PREVACID     Take 30 mg by mouth At Bedtime        lisinopril 40 MG tablet    PRINIVIL/ZESTRIL    30 tablet    Take 1 tablet (40 mg) by mouth daily    Essential hypertension, benign       MULTIVITAMIN TABS   OR           orphenadrine 100 MG 12 hr tablet    NORFLEX     Take 100 mg by mouth 2 times daily as needed for muscle spasms        oxyCODONE IR 5 MG tablet    ROXICODONE    12 tablet    Take 1 tablet (5 mg) by mouth every 6 hours as needed for other (pain control or improvement in physical function.)    Closed fracture of frontal bone, initial encounter (H)       simvastatin 40 MG tablet    ZOCOR    90 tablet    Take 1 tablet (40 mg) by mouth At Bedtime    Hyperlipidemia LDL goal <160

## 2018-07-02 NOTE — TELEPHONE ENCOUNTER
"ED / Discharge Outreach Protocol    Patient Contact    Attempt # 1    Was call answered?  Yes.  \"May I please speak with <patient name>\"  Is patient available?   Yes  Pt stated that she spoke with someone earlier.  Care Coordination contacted her and is following her.    Nell Queen RN      "

## 2018-07-02 NOTE — PROGRESS NOTES
SUBJECTIVE:   Mariah Mart is a 57 year old female who presents to clinic today for the following health issues:        Hospital Follow-up Visit:    Patient was seen at [facility] on 6/28/2018 who recommend she be observed, transferred to [facility] for observation    Hospital/Nursing Home/ Rehab Facility: [facility]  Date of Admission: 6/29/2018  Date of Discharge: 6/29/2018  Reason(s) for Admission: Headache, closed fracture of frontal bone.             Problems taking medications regularly:  None       Medication changes since discharge: Oxycodone 5mg, Augmentin bid 500mg       Problems adhering to non-medication therapy:  None    Summary of hospitalization:  [facility] discharge summary reviewed  Diagnostic Tests/Treatments reviewed.  Follow up needed: none  Other Healthcare Providers Involved in Patient s Care:         None  Update since discharge: stable.     Post Discharge Medication Reconciliation: discharge medications reconciled, continue medications without change.  Plan of care communicated with patient     Coding guidelines for this visit:  Type of Medical   Decision Making Face-to-Face Visit       within 7 Days of discharge Face-to-Face Visit        within 14 days of discharge   Moderate Complexity 34769 [id number]   High Complexity 11014 [id number]              Problem list and histories reviewed & adjusted, as indicated.  Additional history: as documented    Patient Active Problem List   Diagnosis     Essential hypertension, benign     Panic disorder without agoraphobia     GERD (gastroesophageal reflux disease)     Hyperlipidemia LDL goal <160     Advanced directives, counseling/discussion     Cervicalgia     Chronic bilateral low back pain without sciatica     Sacral pain     Somatic dysfunction of sacral region     Thoracic segment dysfunction     Cephalgia     Intractable headache, unspecified chronicity pattern, unspecified headache type     Hip pain, left     Skull  fracture (H)     Past Surgical History:   Procedure Laterality Date     LAPAROSCOPIC APPENDECTOMY N/A 1/10/2018    Procedure: LAPAROSCOPIC APPENDECTOMY;  LAPAROSCOPIC APPENDECTOMY ;  Surgeon: Eugenia De La Rosa MD;  Location: [facility] OR     TONSILLECTOMY & ADENOIDECTOMY         Social History   Substance Use Topics     Smoking status: Never Smoker     Smokeless tobacco: Never Used     Alcohol use Yes      Comment: 1 mixed drink per month     Family History   Problem Relation Age of Onset     Hypertension Mother      HEART DISEASE Mother      MI  at 52     HEART DISEASE Father      : CABG     Respiratory Father      COPD     Hypertension Father      Hypertension Brother      Genitourinary Problems Sister      interstital cystitis     Breast Cancer No family hx of      Cancer - colorectal No family hx of      Diabetes No family hx of          Current Outpatient Prescriptions   Medication Sig Dispense Refill     ALPRAZolam (XANAX) 0.25 MG tablet Take 1 tablet (0.25 mg) by mouth 3 times daily as needed for anxiety 30 tablet 0     amLODIPine (NORVASC) 5 MG tablet Take 1 tablet (5 mg) by mouth daily 90 tablet 3     Boswellia-Glucosamine-Vit D (GLUCOSAMINE COMPLEX PO) Take 2 tablets by mouth At Bedtime        busPIRone (BUSPAR) 10 MG tablet TAKE ONE TABLET BY MOUTH TWICE DAILY  60 tablet 8     Calcium-Magnesium-Vitamin D 500-250-200 MG-MG-UNIT TABS Take 2 tablets by mouth At Bedtime       diclofenac (VOLTAREN) 1 % GEL topical gel Apply 4 grams to knees or 2 grams to hands four times daily using enclosed dosing card. 100 g 1     DULoxetine (CYMBALTA) 60 MG EC capsule Take 1 capsule (60 mg) by mouth daily 90 capsule 3     LANsoprazole (PREVACID) 30 MG CR capsule Take 30 mg by mouth At Bedtime       lisinopril (PRINIVIL/ZESTRIL) 40 MG tablet Take 1 tablet (40 mg) by mouth daily 30 tablet 0     MULTIVITAMIN TABS   OR  (Patient taking differently: 1 tab at HS)       orphenadrine (NORFLEX) 100 MG 12 hr tablet  Take 100 mg by mouth 2 times daily as needed for muscle spasms       simvastatin (ZOCOR) 40 MG tablet Take 1 tablet (40 mg) by mouth At Bedtime 90 tablet 3     Allergies   Allergen Reactions     Gabapentin      Pt feels out of it       Reviewed and updated as needed this visit by clinical staff       Reviewed and updated as needed this visit by Provider         ROS:  All other systems on a 10-point review are negative, unless otherwise noted in HPI      OBJECTIVE:     /74 (BP Location: Right arm, Patient Position: Chair, Cuff Size: Adult Regular)  Pulse 108  Temp 98.3  F (36.8  C) (Oral)  Wt 171 lb (77.6 kg)  LMP 05/30/2007  SpO2 98%  BMI 30.29 kg/m2  Body mass index is 30.29 kg/(m^2).  GENERAL: healthy, alert and no distress  EYES: Eyes grossly normal to inspection, PERRL and conjunctivae and sclerae normal  HENT: ear canals and TM's normal, nose and mouth without ulcers or lesions  NECK: no adenopathy, no asymmetry, masses, or scars and thyroid normal to palpation  RESP: lungs clear to auscultation - no rales, rhonchi or wheezes  CV: regular rate and rhythm, normal S1 S2, no S3 or S4, no murmur, click or rub, no peripheral edema and peripheral pulses strong  ABDOMEN: soft, nontender, no hepatosplenomegaly, no masses and bowel sounds normal  MS: no gross musculoskeletal defects noted, no edema  NEURO: Normal strength and tone, sensory exam grossly normal, mentation intact, cranial nerves 2-12 intact, DTR's normal and symmetric (patellar), gait normal including heel/toe/tandem walking, Romberg normal and rapid alternating movements normal    Diagnostic Test Results:  none     ASSESSMENT/PLAN:     1. Hospital discharge follow-up  Inpatient observation overnight after frontal bone fracture.    2. Closed fracture of frontal bone with routine healing, subsequent encounter  Neuro exam normal today.  Residual headache, possible mild concussion.  S/p fall down stairs.  No LOC.  Has f/u with neurosurgery  outpatient set up.    3. Recurrent falls  This is the patient's second fall in 1 month.  Previously dislocated elbow.  Pt reports both incidents were accidents and is not concerned about this.    4. Chronic bilateral low back pain without sciatica  Pt asking about how to manage her pain, as she cannot use NSAIDS.    See PI.  - diclofenac (VOLTAREN) 1 % GEL topical gel; Apply 4 grams to knees or 2 grams to hands four times daily using enclosed dosing card.  Dispense: 100 g; Refill: 1    5. Panic disorder without agoraphobia  With issues sleeping.  Provided refill per PCP controlled substance note.  - ALPRAZolam (XANAX) 0.25 MG tablet; Take 1 tablet (0.25 mg) by mouth 3 times daily as needed for anxiety  Dispense: 30 tablet; Refill: 0    Patient Instructions   For pain:  -- Will prescribe topical NSAIDS (voltaren gel)  -- In addition, can try lidocaine patch (OTC) or salonpas patch (without voltaren gel)  -- Follow-up with Dr. Dickerson if pain continues to be poorly controlled    Sleep:  -- Will refill xanax per documentation with PCP  -- I also recommend 10 mg melatonin 30 minutes prior to bedtime        Glenis Rodriguez MD  [facility]

## 2018-07-02 NOTE — PROGRESS NOTES
"Clinic Care Coordination Contact    Clinic Care Coordination Contact  OUTREACH    Referral Information:  Referral Source: IP Report  Patient meets Care Coordination outreach criteria due to recent Emergency Department visits and risk of readmission    Primary Diagnosis: Neurological Disorders    Chief Complaint   Patient presents with     Clinic Care Coordination - Post Hospital     s/p fall and skull fracture        Universal Utilization:   Utilization    Last refreshed: 7/1/2018  9:16 PM:  No Show Count (past year) 0       Last refreshed: 7/1/2018  9:16 PM:  ED visits 2       Last refreshed: 7/1/2018  9:16 PM:  Hospital admissions 1          Current as of: 7/1/2018  9:16 PM             Clinical Concerns:  Current Medical Concerns:  Patient has had 2 falls in just over a months time; initially, May of 2018 she had a fall resulting in L) elbow dislocation and fracture; most recently, she states she tripped down a couple of stairs and fell, causing skull fracture.   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   Current Behavioral Concerns: Speech is clear; alert/oriented; engaged appropriately in conversation.     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 Nara Rodriguez today in [place] @ 4:10 pm         Outreach Frequency: 2 weeks  Future Appointments              Today Nara Rodriguez Mai, MD [facility]          Plan:   Patient is coming to clinic today for post-hospital follow-up; wishes to discuss with provider imaging studies and timeline of any potential restrictions in light of her recent fall and injury.   CC RN encouraged patient to also make appointment, as recommended with neurosurgery.   CC RN to do chart-review follow-up in 2 weeks to determine if there are any outstanding needs; available to patient/care team sooner if new needs or questions arise.    Elba Tomlinson RN  Clinic Care Coordinator  [phone]

## 2018-07-03 DIAGNOSIS — S02.0XXA: Primary | ICD-10-CM

## 2018-07-10 ENCOUNTER — HOSPITAL ENCOUNTER (OUTPATIENT)
Dept: CT IMAGING | Facility: CLINIC | Age: 58
Discharge: HOME | End: 2018-07-10
Attending: PHYSICIAN ASSISTANT | Admitting: PHYSICIAN ASSISTANT
Payer: COMMERCIAL

## 2018-07-10 ENCOUNTER — OFFICE VISIT (OUTPATIENT)
Dept: NEUROSURGERY | Facility: CLINIC | Age: 58
End: 2018-07-10
Attending: PHYSICIAN ASSISTANT
Payer: COMMERCIAL

## 2018-07-10 VITALS
WEIGHT: 171 LBS | HEART RATE: 93 BPM | BODY MASS INDEX: 30.3 KG/M2 | SYSTOLIC BLOOD PRESSURE: 117 MMHG | DIASTOLIC BLOOD PRESSURE: 79 MMHG | HEIGHT: 63 IN | OXYGEN SATURATION: 100 %

## 2018-07-10 DIAGNOSIS — S02.0XXA: ICD-10-CM

## 2018-07-10 DIAGNOSIS — S02.0XXD CLOSED FRACTURE OF FRONTAL BONE WITH ROUTINE HEALING, SUBSEQUENT ENCOUNTER: Primary | ICD-10-CM

## 2018-07-10 PROCEDURE — 99214 OFFICE O/P EST MOD 30 MIN: CPT | Performed by: NURSE PRACTITIONER

## 2018-07-10 PROCEDURE — 70450 CT HEAD/BRAIN W/O DYE: CPT

## 2018-07-10 PROCEDURE — G0463 HOSPITAL OUTPT CLINIC VISIT: HCPCS | Mod: 25 | Performed by: NURSE PRACTITIONER

## 2018-07-10 ASSESSMENT — PAIN SCALES - GENERAL: PAINLEVEL: MODERATE PAIN (5)

## 2018-07-10 NOTE — LETTER
7/10/2018         RE: Mariah Mart  [address] [facility] [address]        Dear Colleague,    Thank you for referring your patient, Mariah Mart, to the [facility]. Please see a copy of my visit note below.    Dr. Saleem Leon  [facility]  Neurosurgery Clinic Visit          CC: head and back pain    Primary care Provider: Jennifer Dickerson      Reason For Visit:   I was asked by Dr. Dickerson to consult on the patient for head and back pain.      HPI: Mariah Mart is a 57 year old female with head and back pain.  The pt notes that she fell down the stairs on 6/28/18 and and suffered a left frontal bone skull fracture.  She notes continued headaches only when she is bending over. She also has continued thoracic spine and sternal pain. Her xrays were negative for any fractures to the chest or thoracic spine.  She noted that on 6- she fell at wearing heels. She fell and dislocated her elbow. She then fell again on 6-. I did ask if she was intoxicated. She reports that people keep asking her but she was not and does not like that people are asking her. It was explained that due to the 2 back to back incidents we do ask.  She reports that she was running down the stairs when she lost her footing and fell forward down the stairs.  She denies any nasal drainage at this time.  She notes pain to her mid back and chest area. She denies any radicular pain or weakness.      Pain at its worst 10  Pain right now:  5    Past Medical History:   Diagnosis Date     Essential hypertension, benign      Hypopotassemia     on diuretics     Internal hemorrhoids     ligated     Mixed hyperlipidemia      Obesity 12/28/2010       Past Medical History reviewed with patient during visit.    Past Surgical History:   Procedure Laterality Date     LAPAROSCOPIC APPENDECTOMY N/A 1/10/2018    Procedure: LAPAROSCOPIC APPENDECTOMY;  LAPAROSCOPIC APPENDECTOMY ;   Surgeon: Eugenia De La Rosa MD;  Location: [facility] OR     TONSILLECTOMY & ADENOIDECTOMY       Past Surgical History reviewed with patient during visit.    Current Outpatient Prescriptions   Medication     ALPRAZolam (XANAX) 0.25 MG tablet     amLODIPine (NORVASC) 5 MG tablet     Boswellia-Glucosamine-Vit D (GLUCOSAMINE COMPLEX PO)     busPIRone (BUSPAR) 10 MG tablet     Calcium-Magnesium-Vitamin D 500-250-200 MG-MG-UNIT TABS     diclofenac (VOLTAREN) 1 % GEL topical gel     DULoxetine (CYMBALTA) 60 MG EC capsule     LANsoprazole (PREVACID) 30 MG CR capsule     lisinopril (PRINIVIL/ZESTRIL) 40 MG tablet     MULTIVITAMIN TABS   OR     orphenadrine (NORFLEX) 100 MG 12 hr tablet     simvastatin (ZOCOR) 40 MG tablet     No current facility-administered medications for this visit.        Allergies   Allergen Reactions     Gabapentin      Pt feels out of it       Social History     Social History     Marital status: Single     Spouse name: N/A     Number of children: 3     Years of education: 16     Occupational History     nurse      Social History Main Topics     Smoking status: Never Smoker     Smokeless tobacco: Never Used     Alcohol use Yes      Comment: 1 mixed drink per month     Drug use: No     Sexual activity: Yes     Partners: Male     Other Topics Concern     None     Social History Narrative       Family History   Problem Relation Age of Onset     Hypertension Mother      HEART DISEASE Mother      MI  at 52     HEART DISEASE Father      : CABG     Respiratory Father      COPD     Hypertension Father      Hypertension Brother      Genitourinary Problems Sister      interstital cystitis     Breast Cancer No family hx of      Cancer - colorectal No family hx of      Diabetes No family hx of          Review Of Systems  Skin: negative  Eyes: negative  Ears/Nose/Throat: negative  Respiratory: No shortness of breath, dyspnea on exertion, cough, or hemoptysis  Cardiovascular:  "HTN/HLD  Gastrointestinal: negative  Genitourinary: negative  Musculoskeletal: fracture and back pain  Neurologic: negative  Psychiatric: negative  Hematologic/Lymphatic/Immunologic: negative  Endocrine: negative     ROS: 10 point ROS neg other than the symptoms noted above in the HPI.      Vital Signs: /79 (BP Location: Right arm, Patient Position: Sitting, Cuff Size: Adult Regular)  Pulse 93  Ht 5' 2.5\" (1.588 m)  Wt 171 lb (77.6 kg)  LMP 05/30/2007  SpO2 100%  BMI 30.78 kg/m2    Examination:  Constitutional:  Alert, well nourished, NAD.  Memory: recent and remote memory intact  HEENT: Normocephalic, atraumatic.   Pulm:  Without shortness of breath   CV:  No pitting edema of BLE.    Neurological:  Awake  Alert  Oriented x 3  Speech clear  Cranial nerves II - XII intact  PERRL  EOMI  Face symmetric    Motor exam      Sensation normal to bilateral upper and lower extremities  Muscle tone to bilateral upper and lower extremities normal   Gait: Able to stand from a seated position. Normal non-antalgic, non-myelopathic gait.    No foot drop or drag noted.    Able to heel/toe walk without loss of balance  Thoracic pain with palpation.  No pain with AP or lateral compression.       Lumbar examination reveals no tenderness of the spine or paraspinous muscles.  Hip height is symmetrical. Negative SI joint, sciatic notch or greater trochanteric tenderness to palpation bilaterally.  Straight leg raise is negative bilaterally.      Imaging:     CT head:    IMPRESSION:     1. No evidence of acute intracranial hemorrhage, mass, or herniation.  2. Linear nondisplaced fracture through the left frontal bone and left  frontal sinus appears similar to prior. Previously seen opacification  of the left frontal sinus has cleared. The visualized paranasal  sinuses appear clear.       Assessment/Plan:    Mariah Mart is a 57 year old female with head and back pain.  The pt notes that she fell down the stairs on 6/28/18 " "and and suffered a left frontal bone skull fracture.  She notes continued headaches only when she is bending over. She also has continued thoracic spine and sternal pain. Her xrays were negative for any fractures to the chest or thoracic spine.  She noted that on 6- she fell at wearing heels. She fell and dislocated her elbow. She then fell again on 6-. I did ask if she was intoxicated. She reports that people keep asking her but she was not and does not like that people are asking her. It was explained that due to the 2 back to back incidents we do ask.  She reports that she was running down the stairs when she lost her footing and fell forward down the stairs.  She denies any nasal drainage at this time.  She notes pain to her mid back and chest area. She denies any radicular pain or weakness.  The pts CT scan shows ongoing non-displaced left frontal skull fracture. No surgery indicated for this.  A repeat thoracic xray was offered. However, she felt that it is getting better and did not want a TLSO at this time.    The pt is upset that she is being made to feel like a \"druggie\" or \"alcoholic\". It was explained that I did not indicate this.  She states that it was all due to the Dilaudid in the ER that made her slur her speech.  She was not happy with her hospital experience. It was explained that at this point I would not recommend any surgery. She states that she will obtaining her whole record.  We will have her follow up as needed.  The pt continued to discuss her ER visit and was upset that she had to be transferred to [facility] ER and it took so long. It was explained that if she had needed any type of cranial surgery then it would have been needed to be at [facility] and that is why she was transferred. She reports that \"I am a medical professional too\". She reports that she did not need to be in there.  She also reports that \"I can read a CT better than the doctor\".  Her CT scan was reviewed with her " "again. It was explained again that her sinuses are clear and she can resume activity as tolerated. She then stated that she was never given any restrictions and that she had to give herself restrictions.  She is very unhappy with her visit to the ER.  I reviewed the plan of care again and explained that she can call with questions.  I then ended the visit.  She stated on my way out \"well you don't care either\". The pt was given her AVS and she left.          Patient Instructions   1.  No surgery indicated.     2. Continue activity.     3. Please contact the clinic if pain persists at [phone].          Laurel Jeter CNP  [facility]  [address] [address]    Tel [phone]  Pager [phone]      Again, thank you for allowing me to participate in the care of your patient.        Sincerely,        LAUREN Coyle CNP    "

## 2018-07-10 NOTE — MR AVS SNAPSHOT
"              After Visit Summary   7/10/2018    Mariah Mart    MRN: [id number]           Patient Information     Date Of Birth          1960        Visit Information        Provider Department      7/10/2018 9:20 AM Laurel Jeter APRN CNP [facility]        Care Instructions    1.  No surgery indicated.     2. Continue activity.     3. Please contact the clinic if pain persists at [phone].           Follow-ups after your visit        Who to contact     If you have questions or need follow up information about today's clinic visit or your schedule please contact [facility] directly at [phone].  Normal or non-critical lab and imaging results will be communicated to you by MyChart, letter or phone within 4 business days after the clinic has received the results. If you do not hear from us within 7 days, please contact the clinic through MyChart or phone. If you have a critical or abnormal lab result, we will notify you by phone as soon as possible.  Submit refill requests through [facility] or call your pharmacy and they will forward the refill request to us. Please allow 3 business days for your refill to be completed.          Additional Information About Your Visit        Care EveryWhere ID     This is your Care EveryWhere ID. This could be used by other organizations to access your [facility] medical records  [id number]        Your Vitals Were     Pulse Height Last Period Pulse Oximetry BMI (Body Mass Index)       93 5' 2.5\" (1.588 m) 05/30/2007 100% 30.78 kg/m2        Blood Pressure from Last 3 Encounters:   07/10/18 117/79   07/02/18 122/74   06/29/18 117/71    Weight from Last 3 Encounters:   07/10/18 171 lb (77.6 kg)   07/02/18 171 lb (77.6 kg)   05/23/18 163 lb (73.9 kg)              Today, you had the following     No orders found for display         Today's Medication Changes          These changes are accurate as of 7/10/18  9:27 AM.  If you " have any questions, ask your nurse or doctor.               These medicines have changed or have updated prescriptions.        Dose/Directions    MULTIVITAMIN TABS   OR   This may have changed:  See the new instructions.        Refills:  0                Primary Care Provider Office Phone # Fax #    Jennifer Dickerson [phone] [address] [place] [address]        Equal Access to Services     [facility]: Hadii aad ku hadasho Soomaali, waaxda luqadaha, qaybta kaalmada adeegyada, waxay albaniain hayaan adeeg jamisonfaridagenevieve lamarcel . So [facility] [phone].    ATENCIÓN: Si habla español, tiene a headley disposición servicios gratuitos de asistencia lingüística. Llame al [phone].    We comply with applicable federal civil rights laws and [place] laws. We do not discriminate on the basis of race, color, national origin, age, disability, sex, sexual orientation, or gender identity.            Thank you!     Thank you for choosing [facility]  for your care. Our goal is always to provide you with excellent care. Hearing back from our patients is one way we can continue to improve our services. Please take a few minutes to complete the written survey that you may receive in the mail after your visit with us. Thank you!             Your Updated Medication List - Protect others around you: Learn how to safely use, store and throw away your medicines at www.disposemymeds.org.          This list is accurate as of 7/10/18  9:27 AM.  Always use your most recent med list.                   Brand Name Dispense Instructions for use Diagnosis    ALPRAZolam 0.25 MG tablet    XANAX    30 tablet    Take 1 tablet (0.25 mg) by mouth 3 times daily as needed for anxiety    Panic disorder without agoraphobia       amLODIPine 5 MG tablet    NORVASC    90 tablet    Take 1 tablet (5 mg) by mouth daily    Essential hypertension, benign       busPIRone 10 MG tablet    BUSPAR    60 tablet    TAKE ONE  TABLET BY MOUTH TWICE DAILY    Panic disorder without agoraphobia       Calcium-Magnesium-Vitamin D 500-250-200 MG-MG-UNIT Tabs      Take 2 tablets by mouth At Bedtime        diclofenac 1 % Gel topical gel    VOLTAREN    100 g    Apply 4 grams to knees or 2 grams to hands four times daily using enclosed dosing card.    Panic disorder without agoraphobia       DULoxetine 60 MG EC capsule    CYMBALTA    90 capsule    Take 1 capsule (60 mg) by mouth daily    Panic disorder without agoraphobia, Other headache syndrome       GLUCOSAMINE COMPLEX PO      Take 2 tablets by mouth At Bedtime        LANsoprazole 30 MG CR capsule    PREVACID     Take 30 mg by mouth At Bedtime        lisinopril 40 MG tablet    PRINIVIL/ZESTRIL    30 tablet    Take 1 tablet (40 mg) by mouth daily    Essential hypertension, benign       MULTIVITAMIN TABS   OR           orphenadrine 100 MG 12 hr tablet    NORFLEX     Take 100 mg by mouth 2 times daily as needed for muscle spasms        simvastatin 40 MG tablet    ZOCOR    90 tablet    Take 1 tablet (40 mg) by mouth At Bedtime    Hyperlipidemia LDL goal <160

## 2018-07-10 NOTE — PATIENT INSTRUCTIONS
1.  No surgery indicated.     2. Continue activity.     3. Please contact the clinic if pain persists at [phone].

## 2018-07-10 NOTE — PROGRESS NOTES
Dr. Saleem Leon  [facility]  Neurosurgery Clinic Visit          CC: head and back pain    Primary care Provider: Jennifer Dickerson      Reason For Visit:   I was asked by Dr. Dickerson to consult on the patient for head and back pain.      HPI: Mariah Mart is a 57 year old female with head and back pain.  The pt notes that she fell down the stairs on 6/28/18 and and suffered a left frontal bone skull fracture.  She notes continued headaches only when she is bending over. She also has continued thoracic spine and sternal pain. Her xrays were negative for any fractures to the chest or thoracic spine.  She noted that on 6- she fell at wearing heels. She fell and dislocated her elbow. She then fell again on 6-. I did ask if she was intoxicated. She reports that people keep asking her but she was not and does not like that people are asking her. It was explained that due to the 2 back to back incidents we do ask.  She reports that she was running down the stairs when she lost her footing and fell forward down the stairs.  She denies any nasal drainage at this time.  She notes pain to her mid back and chest area. She denies any radicular pain or weakness.      Pain at its worst 10  Pain right now:  5    Past Medical History:   Diagnosis Date     Essential hypertension, benign      Hypopotassemia     on diuretics     Internal hemorrhoids     ligated     Mixed hyperlipidemia      Obesity 12/28/2010       Past Medical History reviewed with patient during visit.    Past Surgical History:   Procedure Laterality Date     LAPAROSCOPIC APPENDECTOMY N/A 1/10/2018    Procedure: LAPAROSCOPIC APPENDECTOMY;  LAPAROSCOPIC APPENDECTOMY ;  Surgeon: Eugenia De La Rosa MD;  Location:  OR     TONSILLECTOMY & ADENOIDECTOMY       Past Surgical History reviewed with patient during visit.    Current Outpatient Prescriptions   Medication     ALPRAZolam (XANAX) 0.25 MG tablet     amLODIPine (NORVASC)  5 MG tablet     Boswellia-Glucosamine-Vit D (GLUCOSAMINE COMPLEX PO)     busPIRone (BUSPAR) 10 MG tablet     Calcium-Magnesium-Vitamin D 500-250-200 MG-MG-UNIT TABS     diclofenac (VOLTAREN) 1 % GEL topical gel     DULoxetine (CYMBALTA) 60 MG EC capsule     LANsoprazole (PREVACID) 30 MG CR capsule     lisinopril (PRINIVIL/ZESTRIL) 40 MG tablet     MULTIVITAMIN TABS   OR     orphenadrine (NORFLEX) 100 MG 12 hr tablet     simvastatin (ZOCOR) 40 MG tablet     No current facility-administered medications for this visit.        Allergies   Allergen Reactions     Gabapentin      Pt feels out of it       Social History     Social History     Marital status: Single     Spouse name: N/A     Number of children: 3     Years of education: 16     Occupational History     nurse      Social History Main Topics     Smoking status: Never Smoker     Smokeless tobacco: Never Used     Alcohol use Yes      Comment: 1 mixed drink per month     Drug use: No     Sexual activity: Yes     Partners: Male     Other Topics Concern     None     Social History Narrative       Family History   Problem Relation Age of Onset     Hypertension Mother      HEART DISEASE Mother      MI  at 52     HEART DISEASE Father      : CABG     Respiratory Father      COPD     Hypertension Father      Hypertension Brother      Genitourinary Problems Sister      interstital cystitis     Breast Cancer No family hx of      Cancer - colorectal No family hx of      Diabetes No family hx of          Review Of Systems  Skin: negative  Eyes: negative  Ears/Nose/Throat: negative  Respiratory: No shortness of breath, dyspnea on exertion, cough, or hemoptysis  Cardiovascular: HTN/HLD  Gastrointestinal: negative  Genitourinary: negative  Musculoskeletal: fracture and back pain  Neurologic: negative  Psychiatric: negative  Hematologic/Lymphatic/Immunologic: negative  Endocrine: negative     ROS: 10 point ROS neg other than the symptoms noted above in the  "HPI.      Vital Signs: /79 (BP Location: Right arm, Patient Position: Sitting, Cuff Size: Adult Regular)  Pulse 93  Ht 5' 2.5\" (1.588 m)  Wt 171 lb (77.6 kg)  LMP 05/30/2007  SpO2 100%  BMI 30.78 kg/m2    Examination:  Constitutional:  Alert, well nourished, NAD.  Memory: recent and remote memory intact  HEENT: Normocephalic, atraumatic.   Pulm:  Without shortness of breath   CV:  No pitting edema of BLE.    Neurological:  Awake  Alert  Oriented x 3  Speech clear  Cranial nerves II - XII intact  PERRL  EOMI  Face symmetric    Motor exam      Sensation normal to bilateral upper and lower extremities  Muscle tone to bilateral upper and lower extremities normal   Gait: Able to stand from a seated position. Normal non-antalgic, non-myelopathic gait.    No foot drop or drag noted.    Able to heel/toe walk without loss of balance  Thoracic pain with palpation.  No pain with AP or lateral compression.       Lumbar examination reveals no tenderness of the spine or paraspinous muscles.  Hip height is symmetrical. Negative SI joint, sciatic notch or greater trochanteric tenderness to palpation bilaterally.  Straight leg raise is negative bilaterally.      Imaging:     CT head:    IMPRESSION:     1. No evidence of acute intracranial hemorrhage, mass, or herniation.  2. Linear nondisplaced fracture through the left frontal bone and left  frontal sinus appears similar to prior. Previously seen opacification  of the left frontal sinus has cleared. The visualized paranasal  sinuses appear clear.       Assessment/Plan:    Mariah Mart is a 57 year old female with head and back pain.  The pt notes that she fell down the stairs on 6/28/18 and and suffered a left frontal bone skull fracture.  She notes continued headaches only when she is bending over. She also has continued thoracic spine and sternal pain. Her xrays were negative for any fractures to the chest or thoracic spine.  She noted that on 6- she fell " "at wearing heels. She fell and dislocated her elbow. She then fell again on 6-. I did ask if she was intoxicated. She reports that people keep asking her but she was not and does not like that people are asking her. It was explained that due to the 2 back to back incidents we do ask.  She reports that she was running down the stairs when she lost her footing and fell forward down the stairs.  She denies any nasal drainage at this time.  She notes pain to her mid back and chest area. She denies any radicular pain or weakness.  The pts CT scan shows ongoing non-displaced left frontal skull fracture. No surgery indicated for this.  A repeat thoracic xray was offered. However, she felt that it is getting better and did not want a TLSO at this time.    The pt is upset that she is being made to feel like a \"druggie\" or \"alcoholic\". It was explained that I did not indicate this.  She states that it was all due to the Dilaudid in the ER that made her slur her speech.  She was not happy with her hospital experience. It was explained that at this point I would not recommend any surgery. She states that she will obtaining her whole record.  We will have her follow up as needed.  The pt continued to discuss her ER visit and was upset that she had to be transferred to [facility] ER and it took so long. It was explained that if she had needed any type of cranial surgery then it would have been needed to be at [facility] and that is why she was transferred. She reports that \"I am a medical professional too\". She reports that she did not need to be in there.  She also reports that \"I can read a CT better than the doctor\".  Her CT scan was reviewed with her again. It was explained again that her sinuses are clear and she can resume activity as tolerated. She then stated that she was never given any restrictions and that she had to give herself restrictions.  She is very unhappy with her visit to the ER.  I reviewed the plan of care again " "and explained that she can call with questions.  I then ended the visit.  She stated on my way out \"well you don't care either\". The pt was given her AVS and she left.          Patient Instructions   1.  No surgery indicated.     2. Continue activity.     3. Please contact the clinic if pain persists at [phone].          Laurel Jeter [facility]  [facility]  [address] [address]    Tel [phone]  Pager [phone]    "

## 2018-07-10 NOTE — NURSING NOTE
"Mariah Mart is a 57 year old female who presents for:  Chief Complaint   Patient presents with     Neurologic Problem     Hospital follow up from 06/28/2018, Left frontal skull fracture patient fell off the basement stairs, on and off she does have headaches        Vitals:    Vitals:    07/10/18 0904   BP: 117/79   BP Location: Right arm   Patient Position: Sitting   Cuff Size: Adult Regular   Pulse: 93   SpO2: 100%   Weight: 171 lb (77.6 kg)   Height: 5' 2.5\" (1.588 m)       BMI:  Estimated body mass index is 30.78 kg/(m^2) as calculated from the following:    Height as of this encounter: 5' 2.5\" (1.588 m).    Weight as of this encounter: 171 lb (77.6 kg).    Pain Score:  Moderate Pain (5)      Do you feel safe in your environment?  Yes      Astrid Breaux          "

## 2018-07-12 ENCOUNTER — TELEPHONE (OUTPATIENT)
Dept: PEDIATRICS | Facility: CLINIC | Age: 58
End: 2018-07-12

## 2018-07-12 NOTE — TELEPHONE ENCOUNTER
Called spoke to patient no same day's left, scheduled at the end of the day tomorrow per Dr. Dickerson's request.    Thanks  Juan SALAZAR  Team Coodinator

## 2018-07-12 NOTE — TELEPHONE ENCOUNTER
Please call: I need to see her.  You can use same day or overbook at end of day tomorrow to fit her in.

## 2018-07-12 NOTE — TELEPHONE ENCOUNTER
"PCP:    The Pt called in with extensive concerns about her care and is also requesting additional imaging be ordered for her Thoracic back pain.   Had a fall and was treated at [facility] on 6/28/18.     1. The Pt is very displeased with her hospital stay at [facility]. She reports having to \"tell the aides how to do their jobs, the doctor couldn't find my skull fx, I pointed it out to him\" The Pt reports \"I am medically more knowledgeable than most of the people at [facility]\"  I offered to provide her the Patient Relations number to discuss her concerns further and she replied \"I can find the number myself\"    2. The Pt brought her daughter into a Neurosurgeon and she brought up her own ongoing thoracic back pain. She reports the Neurosurgeon (who is not treating her) advised she have further imaging done ASAP.     3. The Pt reports ongoing Thoracic pain on the right side. Please review imaging that has already been done.     Please advise.     Patricia Allison RN -- [facility]       "

## 2018-07-13 ENCOUNTER — OFFICE VISIT (OUTPATIENT)
Dept: PEDIATRICS | Facility: CLINIC | Age: 58
End: 2018-07-13
Payer: COMMERCIAL

## 2018-07-13 VITALS
HEART RATE: 83 BPM | HEIGHT: 63 IN | SYSTOLIC BLOOD PRESSURE: 132 MMHG | BODY MASS INDEX: 30.3 KG/M2 | DIASTOLIC BLOOD PRESSURE: 80 MMHG | OXYGEN SATURATION: 99 % | WEIGHT: 171 LBS | TEMPERATURE: 98.4 F

## 2018-07-13 DIAGNOSIS — M54.6 ACUTE MIDLINE THORACIC BACK PAIN: Primary | ICD-10-CM

## 2018-07-13 DIAGNOSIS — S02.0XXD CLOSED FRACTURE OF FRONTAL BONE WITH ROUTINE HEALING, SUBSEQUENT ENCOUNTER: ICD-10-CM
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
## 2018-07-13 DIAGNOSIS — I10 ESSENTIAL HYPERTENSION, BENIGN: ICD-10-CM

## 2018-07-13 PROCEDURE — 99214 OFFICE O/P EST MOD 30 MIN: CPT | Performed by: INTERNAL MEDICINE

## 2018-07-13 RX ORDER — LISINOPRIL 40 MG/1
TABLET ORAL
Qty: 90 TABLET | Refills: 3 | Status: SHIPPED | OUTPATIENT
Start: 2018-07-13 | End: 2019-05-16

## 2018-07-13 RX ORDER — TRAMADOL HYDROCHLORIDE 50 MG/1
50 TABLET ORAL EVERY 6 HOURS PRN
Qty: 30 TABLET | Refills: 0 | Status: SHIPPED | OUTPATIENT
Start: 2018-07-13 | End: 2018-08-19

## 2018-07-13 NOTE — MR AVS SNAPSHOT
After Visit Summary   7/13/2018    Mariah Mart    MRN: [id number]           Patient Information     Date Of Birth          1960        Visit Information        Provider Department      7/13/2018 4:10 PM Jennifer Dickerson MD [facility]        Today's Diagnoses     Acute midline thoracic back pain    -  1    Essential hypertension, benign        Closed fracture of frontal bone with routine healing, subsequent encounter        Fall down stairs, subsequent encounter          Care Instructions    You can call radiology scheduling at [phone] to schedule CT if you don't want to wait for their call.    Take the tramadol four times daily as needed for pain.    Be cautious with activity - no heavy lifting.    If the CT does not show a bone fracture, I will refer you back to [facility].          Follow-ups after your visit        Future tests that were ordered for you today     Open Future Orders        Priority Expected Expires Ordered    CT Thoracic Spine w/o Contrast Routine  7/13/2019 7/13/2018            Who to contact     If you have questions or need follow up information about today's clinic visit or your schedule please contact [facility] directly at [phone].  Normal or non-critical lab and imaging results will be communicated to you by MyChart, letter or phone within 4 business days after the clinic has received the results. If you do not hear from us within 7 days, please contact the clinic through MyChart or phone. If you have a critical or abnormal lab result, we will notify you by phone as soon as possible.  Submit refill requests through [employer] or call your pharmacy and they will forward the refill request to us. Please allow 3 business days for your refill to be completed.          Additional Information About Your Visit        Care EveryWhere ID     This is your Care EveryWhere ID. This could be used by other organizations to access your [facility]  "records  [id number]        Your Vitals Were     Pulse Temperature Height Last Period Pulse Oximetry BMI (Body Mass Index)    83 98.4  F (36.9  C) (Tympanic) 5' 2.5\" (1.588 m) 05/30/2007 99% 30.78 kg/m2       Blood Pressure from Last 3 Encounters:   07/13/18 132/80   07/10/18 117/79   07/02/18 122/74    Weight from Last 3 Encounters:   07/13/18 171 lb (77.6 kg)   07/10/18 171 lb (77.6 kg)   07/02/18 171 lb (77.6 kg)                 Today's Medication Changes          These changes are accurate as of 7/13/18  5:06 PM.  If you have any questions, ask your nurse or doctor.               Start taking these medicines.        Dose/Directions    traMADol 50 MG tablet   Commonly known as:  ULTRAM   Used for:  Acute midline thoracic back pain, Fall down stairs, subsequent encounter   Started by:  Jennifer Dickerson MD        Dose:  50 mg   Take 1 tablet (50 mg) by mouth every 6 hours as needed for severe pain   Quantity:  30 tablet   Refills:  0         These medicines have changed or have updated prescriptions.        Dose/Directions    MULTIVITAMIN TABS   OR   This may have changed:  See the new instructions.        Refills:  0            Where to get your medicines      These medications were sent to [id number] [place] [address]     Phone:  [phone]     lisinopril 40 MG tablet         Some of these will need a paper prescription and others can be bought over the counter.  Ask your nurse if you have questions.     Bring a paper prescription for each of these medications     traMADol 50 MG tablet               Information about OPIOIDS     PRESCRIPTION OPIOIDS: WHAT YOU NEED TO KNOW   We gave you an opioid (narcotic) pain medicine. It is important to manage your pain, but opioids are not always the best choice. You should first try all the other options your care team gave you. Take this medicine for as short a time (and as few doses) as possible.     These " medicines have risks:    DO NOT drive when on new or higher doses of pain medicine. These medicines can affect your alertness and reaction times, and you could be arrested for driving under the influence (DUI). If you need to use opioids long-term, talk to your care team about driving.    DO NOT operate heave machinery    DO NOT do any other dangerous activities while taking these medicines.     DO NOT drink any alcohol while taking these medicines.      If the opioid prescribed includes acetaminophen, DO NOT take with any other medicines that contain acetaminophen. Read all labels carefully. Look for the word  acetaminophen  or  Tylenol.  Ask your pharmacist if you have questions or are unsure.    You can get addicted to pain medicines, especially if you have a history of addiction (chemical, alcohol or substance dependence). Talk to your care team about ways to reduce this risk.    Store your pills in a secure place, locked if possible. We will not replace any lost or stolen medicine. If you don t finish your medicine, please throw away (dispose) as directed by your pharmacist. The [place] Pollution Control Agency has more information about safe disposal: https://www.pca.[facility].[place].us/living-green/managing-unwanted-medications.     All opioids tend to cause constipation. Drink plenty of water and eat foods that have a lot of fiber, such as fruits, vegetables, prune juice, apple juice and high-fiber cereal. Take a laxative (Miralax, milk of magnesia, Colace, Senna) if you don t move your bowels at least every other day.          Primary Care Provider Office Phone # Fax #    Jennifer Dickerson [phone]       [facility] [address] [place] [address]        Equal Access to Services     [facility] : Hadii ruba carcamo Sobelgica, waaxda luqadaha, qaybta kaalpaulina watson. [facility] [phone].    ATENCIÓN: Si habla español, tiene a headley disposición  servicios gratuitos de asistencia lingüística. Antolin harrington [phone].    We comply with applicable federal civil rights laws and [place] laws. We do not discriminate on the basis of race, color, national origin, age, disability, sex, sexual orientation, or gender identity.            Thank you!     Thank you for choosing [facility]  for your care. Our goal is always to provide you with excellent care. Hearing back from our patients is one way we can continue to improve our services. Please take a few minutes to complete the written survey that you may receive in the mail after your visit with us. Thank you!             Your Updated Medication List - Protect others around you: Learn how to safely use, store and throw away your medicines at www.disposemymeds.org.          This list is accurate as of 7/13/18  5:06 PM.  Always use your most recent med list.                   Brand Name Dispense Instructions for use Diagnosis    ALPRAZolam 0.25 MG tablet    XANAX    30 tablet    Take 1 tablet (0.25 mg) by mouth 3 times daily as needed for anxiety    Panic disorder without agoraphobia       amLODIPine 5 MG tablet    NORVASC    90 tablet    Take 1 tablet (5 mg) by mouth daily    Essential hypertension, benign       busPIRone 10 MG tablet    BUSPAR    60 tablet    TAKE ONE TABLET BY MOUTH TWICE DAILY    Panic disorder without agoraphobia       Calcium-Magnesium-Vitamin D 500-250-200 MG-MG-UNIT Tabs      Take 2 tablets by mouth At Bedtime        DULoxetine 60 MG EC capsule    CYMBALTA    90 capsule    Take 1 capsule (60 mg) by mouth daily    Panic disorder without agoraphobia, Other headache syndrome       GLUCOSAMINE COMPLEX PO      Take 2 tablets by mouth At Bedtime        LANsoprazole 30 MG CR capsule    PREVACID     Take 30 mg by mouth At Bedtime        lisinopril 40 MG tablet    PRINIVIL/ZESTRIL    90 tablet    Take 1 tablet (40 mg) by mouth daily    Essential hypertension, benign       MULTIVITAMIN TABS    OR           orphenadrine 100 MG 12 hr tablet    NORFLEX     Take 100 mg by mouth 2 times daily as needed for muscle spasms        simvastatin 40 MG tablet    ZOCOR    90 tablet    Take 1 tablet (40 mg) by mouth At Bedtime    Hyperlipidemia LDL goal <160       traMADol 50 MG tablet    ULTRAM    30 tablet    Take 1 tablet (50 mg) by mouth every 6 hours as needed for severe pain    Acute midline thoracic back pain, Fall down stairs, subsequent encounter

## 2018-07-13 NOTE — PATIENT INSTRUCTIONS
You can call radiology scheduling at [phone] to schedule CT if you don't want to wait for their call.    Take the tramadol four times daily as needed for pain.    Be cautious with activity - no heavy lifting.    If the CT does not show a bone fracture, I will refer you back to [facility].

## 2018-07-14 ENCOUNTER — HOSPITAL ENCOUNTER (OUTPATIENT)
Dept: CT IMAGING | Facility: CLINIC | Age: 58
Discharge: HOME | End: 2018-07-14
Attending: INTERNAL MEDICINE | Admitting: INTERNAL MEDICINE
Payer: COMMERCIAL

## 2018-07-14 DIAGNOSIS — M54.6 ACUTE MIDLINE THORACIC BACK PAIN: ICD-10-CM
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
## 2018-07-14 PROCEDURE — 72128 CT CHEST SPINE W/O DYE: CPT

## 2018-07-16 ENCOUNTER — TELEPHONE (OUTPATIENT)
Dept: PEDIATRICS | Facility: CLINIC | Age: 58
End: 2018-07-16

## 2018-07-16 DIAGNOSIS — S22.000D CLOSED POSTTRAUMATIC COMPRESSION FRACTURE OF THORACIC VERTEBRA WITH ROUTINE HEALING, SUBSEQUENT ENCOUNTER: Primary | ICD-10-CM

## 2018-07-16 NOTE — TELEPHONE ENCOUNTER
Patient calling in [facility] Dr. Dickerson just called her. She would like a callback.    Thanks  Juan SALAZAR  Team Coodinator

## 2018-07-16 NOTE — TELEPHONE ENCOUNTER
Gave patient results, referred to [facility].  Please fax last office note with me and CT thoracic spine to [facility].  Will be seeing Dr. Lane there.

## 2018-07-18 ENCOUNTER — PATIENT OUTREACH (OUTPATIENT)
Dept: CARE COORDINATION | Facility: CLINIC | Age: 58
End: 2018-07-18

## 2018-07-18 NOTE — PROGRESS NOTES
Clinic Care Coordination Contact  Care Team Conversations    CC RN outreached to patient in follow up to determine if she had any outstanding questions or concerns regarding follow-up care or questions.   She indicated she has an established follow-up appointment with [facility] next Tuesday July 24th.   She has no new concerns to report an no outstanding questions.     PLAN:  No further scheduled outreaches by clinic care coordinator at this time; patient encouraged to contact CC RN with any outstanding questions or concerns if they arise in the future.     Elba Tomlinson RN  Clinic Care Coordinator  [phone]

## 2018-07-24 ENCOUNTER — TRANSFERRED RECORDS (OUTPATIENT)
Dept: HEALTH INFORMATION MANAGEMENT | Facility: CLINIC | Age: 58
End: 2018-07-24

## 2018-09-11 ENCOUNTER — OFFICE VISIT (OUTPATIENT)
Dept: PEDIATRICS | Facility: CLINIC | Age: 58
End: 2018-09-11
Payer: COMMERCIAL

## 2018-09-11 VITALS
HEART RATE: 84 BPM | OXYGEN SATURATION: 97 % | DIASTOLIC BLOOD PRESSURE: 72 MMHG | WEIGHT: 167.5 LBS | HEIGHT: 63 IN | TEMPERATURE: 97.7 F | SYSTOLIC BLOOD PRESSURE: 128 MMHG | BODY MASS INDEX: 29.68 KG/M2

## 2018-09-11 DIAGNOSIS — S22.000G CLOSED POSTTRAUMATIC COMPRESSION FRACTURE OF THORACIC VERTEBRA WITH DELAYED HEALING, SUBSEQUENT ENCOUNTER: Primary | ICD-10-CM

## 2018-09-11 DIAGNOSIS — M54.12 CERVICAL RADICULOPATHY: ICD-10-CM

## 2018-09-11 DIAGNOSIS — Z23 NEED FOR PROPHYLACTIC VACCINATION AND INOCULATION AGAINST INFLUENZA: ICD-10-CM

## 2018-09-11 PROCEDURE — 99213 OFFICE O/P EST LOW 20 MIN: CPT | Mod: 25 | Performed by: INTERNAL MEDICINE

## 2018-09-11 PROCEDURE — 90471 IMMUNIZATION ADMIN: CPT | Performed by: INTERNAL MEDICINE

## 2018-09-11 PROCEDURE — 90682 RIV4 VACC RECOMBINANT DNA IM: CPT | Performed by: INTERNAL MEDICINE
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
## 2018-09-11 NOTE — PATIENT INSTRUCTIONS
Schedule follow-up with [facility] - [phone].  The specialist will help review your films and determine next steps that are best.    You can call radiology scheduling at [phone] but they will also call you.

## 2018-09-11 NOTE — MR AVS SNAPSHOT
After Visit Summary   9/11/2018    Mariah Mart    MRN: [id number]           Patient Information     Date Of Birth          1960        Visit Information        Provider Department      9/11/2018 1:30 PM Jennifer Dickerson MD [facility]        Today's Diagnoses     Closed posttraumatic compression fracture of thoracic vertebra with routine healing, subsequent encounter    -  1    Cervical radiculopathy        Need for prophylactic vaccination and inoculation against influenza          Care Instructions    Schedule follow-up with [facility] - [phone].  The specialist will help review your films and determine next steps that are best.    You can call radiology scheduling at [phone] but they will also call you.          Follow-ups after your visit        Additional Services     ORTHO  REFERRAL       Coverage of these services is subject to the terms and limitations of your health insurance plan. Please call member services at your health plan with any benefit or coverage questions.       [facility] is referring you to the Orthopedic  Services at [facility].       The  representative will assist you in the coordination of your orthopedic and musculoskeletal care as prescribed by your physician.    The  representative will call you within 24 hours or   You may contact the  representative at:   [phone] for [place] and [facility]  [phone] for [facility], and [facility]  [phone] for [facility]    Type of Referral : Spine: Cervical / Thoracic: Cervical / Thoracic Spine Surgeon  - wants [facility] for second opinion.        Timeframe requested: Within 2 weeks       If X-rays, CT or MRI's   have been performed, please contact the facility where they were done, to arrange for  prior to your scheduled appointment. Please bring this referral request to your appointment and present it to  "your specialist.                  Follow-up notes from your care team     Return in about 5 months (around 2/11/2019) for Physical Exam.      Future tests that were ordered for you today     Open Future Orders        Priority Expected Expires Ordered    MR Cervical Spine w/o Contrast Routine  9/11/2019 9/11/2018    MR Thoracic Spine w/o Contrast Routine  9/11/2019 9/11/2018            Who to contact     If you have questions or need follow up information about today's clinic visit or your schedule please contact [facility] directly at [phone].  Normal or non-critical lab and imaging results will be communicated to you by MyChart, letter or phone within 4 business days after the clinic has received the results. If you do not hear from us within 7 days, please contact the clinic through MyChart or phone. If you have a critical or abnormal lab result, we will notify you by phone as soon as possible.  Submit refill requests through [employer] or call your pharmacy and they will forward the refill request to us. Please allow 3 business days for your refill to be completed.          Additional Information About Your Visit        Care EveryWhere ID     This is your Care EveryWhere ID. This could be used by other organizations to access your [place] medical records  [id number]        Your Vitals Were     Pulse Temperature Height Last Period Pulse Oximetry BMI (Body Mass Index)    84 97.7  F (36.5  C) (Oral) 5' 2.5\" (1.588 m) 05/30/2007 97% 30.15 kg/m2       Blood Pressure from Last 3 Encounters:   09/11/18 128/72   07/13/18 132/80   07/10/18 117/79    Weight from Last 3 Encounters:   09/11/18 167 lb 8 oz (76 kg)   07/13/18 171 lb (77.6 kg)   07/10/18 171 lb (77.6 kg)              We Performed the Following     FLU VACCINE, (RIV4) RECOMBINANT HA  , IM (FluBlok, egg free) [02789]- >18 YRS (G recommended  50-64 YRS)     ORTHO  REFERRAL     Vaccine Administration, Initial [32587]          Today's " Medication Changes          These changes are accurate as of 9/11/18  2:15 PM.  If you have any questions, ask your nurse or doctor.               These medicines have changed or have updated prescriptions.        Dose/Directions    MULTIVITAMIN TABS   OR   This may have changed:  See the new instructions.        Refills:  0                Primary Care Provider Office Phone # Fax #    Jennifer Dickerson [phone] [address] [place] [address]        Equal Access to Services     [facility]: Hadii aad ku hadasho Soomaali, waaxda luqadaha, qaybta kaalmada adeegyada, waxay idiin hayaan adeeg calli mckeon . So [facility] [phone].    ATENCIÓN: Si juanla esprodrigo, tiene a headley disposición servicios gratuitos de asistencia lingüística. [facility] [phone].    We comply with applicable federal civil rights laws and [place] laws. We do not discriminate on the basis of race, color, national origin, age, disability, sex, sexual orientation, or gender identity.            Thank you!     Thank you for choosing [facility]  for your care. Our goal is always to provide you with excellent care. Hearing back from our patients is one way we can continue to improve our services. Please take a few minutes to complete the written survey that you may receive in the mail after your visit with us. Thank you!             Your Updated Medication List - Protect others around you: Learn how to safely use, store and throw away your medicines at www.disposemymeds.org.          This list is accurate as of 9/11/18  2:15 PM.  Always use your most recent med list.                   Brand Name Dispense Instructions for use Diagnosis    ALPRAZolam 0.25 MG tablet    XANAX    30 tablet    Take 1 tablet (0.25 mg) by mouth 3 times daily as needed for anxiety    Panic disorder without agoraphobia       amLODIPine 5 MG tablet    NORVASC    90 tablet    Take 1 tablet (5 mg) by mouth daily    Essential  hypertension, benign       busPIRone 10 MG tablet    BUSPAR    60 tablet    TAKE ONE TABLET BY MOUTH TWICE DAILY    Panic disorder without agoraphobia       Calcium-Magnesium-Vitamin D 500-250-200 MG-MG-UNIT Tabs      Take 2 tablets by mouth At Bedtime        DULoxetine 60 MG EC capsule    CYMBALTA    90 capsule    Take 1 capsule (60 mg) by mouth daily    Panic disorder without agoraphobia, Other headache syndrome       GLUCOSAMINE COMPLEX PO      Take 2 tablets by mouth At Bedtime        LANsoprazole 30 MG CR capsule    PREVACID     Take 30 mg by mouth At Bedtime        lisinopril 40 MG tablet    PRINIVIL/ZESTRIL    90 tablet    Take 1 tablet (40 mg) by mouth daily    Essential hypertension, benign       MULTIVITAMIN TABS   OR           orphenadrine 100 MG 12 hr tablet    NORFLEX     Take 100 mg by mouth 2 times daily as needed for muscle spasms        simvastatin 40 MG tablet    ZOCOR    90 tablet    Take 1 tablet (40 mg) by mouth At Bedtime    Hyperlipidemia LDL goal <160       traMADol 50 MG tablet    ULTRAM    30 tablet    TAKE 1 TABLET BY MOUTH EVERY 6 HOURS AS NEEDED FOR SEVERE PAIN    Acute midline thoracic back pain, Fall down stairs, subsequent encounter

## 2018-09-16 ENCOUNTER — HOSPITAL ENCOUNTER (OUTPATIENT)
Dept: MRI IMAGING | Facility: CLINIC | Age: 58
End: 2018-09-16
Attending: INTERNAL MEDICINE
Payer: COMMERCIAL

## 2018-09-16 ENCOUNTER — HOSPITAL ENCOUNTER (OUTPATIENT)
Dept: MRI IMAGING | Facility: CLINIC | Age: 58
Discharge: HOME | End: 2018-09-16
Attending: INTERNAL MEDICINE | Admitting: INTERNAL MEDICINE
Payer: COMMERCIAL

## 2018-09-16 DIAGNOSIS — S22.000D CLOSED POSTTRAUMATIC COMPRESSION FRACTURE OF THORACIC VERTEBRA WITH ROUTINE HEALING, SUBSEQUENT ENCOUNTER: ICD-10-CM

## 2018-09-16 DIAGNOSIS — M54.12 CERVICAL RADICULOPATHY: ICD-10-CM

## 2018-09-16 PROBLEM — S02.91XA SKULL FRACTURE (H): Status: RESOLVED | Noted: 2018-06-29 | Resolved: 2018-09-16

## 2018-09-16 PROCEDURE — 72146 MRI CHEST SPINE W/O DYE: CPT

## 2018-09-16 PROCEDURE — 72141 MRI NECK SPINE W/O DYE: CPT

## 2018-09-21 ENCOUNTER — TRANSFERRED RECORDS (OUTPATIENT)
Dept: HEALTH INFORMATION MANAGEMENT | Facility: CLINIC | Age: 58
End: 2018-09-21

## 2019-02-13 DIAGNOSIS — F41.0 PANIC DISORDER WITHOUT AGORAPHOBIA: ICD-10-CM

## 2019-02-14 RX ORDER — BUSPIRONE HYDROCHLORIDE 10 MG/1
10 TABLET ORAL 2 TIMES DAILY
Qty: 60 TABLET | Refills: 5 | Status: SHIPPED | OUTPATIENT
Start: 2019-02-14 | End: 2019-03-12

## 2019-02-26 DIAGNOSIS — M54.50 CHRONIC BILATERAL LOW BACK PAIN WITHOUT SCIATICA: Primary | ICD-10-CM

## 2019-02-26 DIAGNOSIS — G89.29 CHRONIC BILATERAL LOW BACK PAIN WITHOUT SCIATICA: Primary | ICD-10-CM

## 2019-02-26 NOTE — TELEPHONE ENCOUNTER
Requested Prescriptions   Pending Prescriptions Disp Refills     orphenadrine ER (NORFLEX) 100 MG 12 hr tablet        Last Written Prescription Date:  2/2/2018  Last Fill Quantity: 90,   # refills: 3  Last Office Visit: 9/11/2018  Future Office visit:       Routing refill request to provider for review/approval because:  Drug not on the [facility], [facility] or [facility] refill protocol or controlled substance         Sig: Take 1 tablet (100 mg) by mouth 2 times daily as needed for muscle spasms    There is no refill protocol information for this order

## 2019-02-28 RX ORDER — ORPHENADRINE CITRATE 100 MG/1
100 TABLET, EXTENDED RELEASE ORAL 2 TIMES DAILY PRN
Qty: 60 TABLET | Refills: 0 | Status: SHIPPED | OUTPATIENT
Start: 2019-02-28 | End: 2020-04-16

## 2019-03-02 NOTE — NURSING NOTE
"Chief Complaint   Patient presents with     Physical       Initial /82 (BP Location: Right arm, Patient Position: Chair, Cuff Size: Adult Regular)  Pulse 81  Temp 98  F (36.7  C) (Oral)  Ht 5' 2.5\" (1.588 m)  Wt 169 lb 14.4 oz (77.1 kg)  LMP 05/30/2007  SpO2 99%  BMI 30.58 kg/m2 Estimated body mass index is 30.58 kg/(m^2) as calculated from the following:    Height as of this encounter: 5' 2.5\" (1.588 m).    Weight as of this encounter: 169 lb 14.4 oz (77.1 kg).  Medication Reconciliation: complete   Drea Marquez MA    "
Health Care Proxy (HCP)

## 2019-03-12 ENCOUNTER — ANCILLARY PROCEDURE (OUTPATIENT)
Dept: MAMMOGRAPHY | Facility: CLINIC | Age: 59
End: 2019-03-12
Attending: INTERNAL MEDICINE
Payer: COMMERCIAL

## 2019-03-12 ENCOUNTER — OFFICE VISIT (OUTPATIENT)
Dept: PEDIATRICS | Facility: CLINIC | Age: 59
End: 2019-03-12
Payer: COMMERCIAL

## 2019-03-12 VITALS
OXYGEN SATURATION: 98 % | BODY MASS INDEX: 28.19 KG/M2 | HEART RATE: 80 BPM | TEMPERATURE: 96 F | DIASTOLIC BLOOD PRESSURE: 60 MMHG | HEIGHT: 63 IN | WEIGHT: 159.1 LBS | SYSTOLIC BLOOD PRESSURE: 130 MMHG

## 2019-03-12 DIAGNOSIS — I10 ESSENTIAL HYPERTENSION, BENIGN: ICD-10-CM

## 2019-03-12 DIAGNOSIS — E78.5 HYPERLIPIDEMIA LDL GOAL <160: ICD-10-CM

## 2019-03-12 DIAGNOSIS — G44.89 OTHER HEADACHE SYNDROME: ICD-10-CM

## 2019-03-12 DIAGNOSIS — Z00.00 ENCOUNTER FOR ROUTINE HISTORY AND PHYSICAL EXAMINATION OF ADULT: Primary | ICD-10-CM

## 2019-03-12 DIAGNOSIS — Z12.31 VISIT FOR SCREENING MAMMOGRAM: ICD-10-CM

## 2019-03-12 DIAGNOSIS — F41.0 PANIC DISORDER WITHOUT AGORAPHOBIA: ICD-10-CM

## 2019-03-12 PROBLEM — R51.9 INTRACTABLE HEADACHE, UNSPECIFIED CHRONICITY PATTERN, UNSPECIFIED HEADACHE TYPE: Status: RESOLVED | Noted: 2017-04-18 | Resolved: 2019-03-12

## 2019-03-12 PROBLEM — R51.9 CEPHALGIA: Status: RESOLVED | Noted: 2017-03-11 | Resolved: 2019-03-12

## 2019-03-12 PROCEDURE — 77067 SCR MAMMO BI INCL CAD: CPT | Mod: TC

## 2019-03-12 PROCEDURE — 99396 PREV VISIT EST AGE 40-64: CPT | Performed by: INTERNAL MEDICINE

## 2019-03-12 RX ORDER — SIMVASTATIN 40 MG
40 TABLET ORAL DAILY
Qty: 90 TABLET | Refills: 3 | Status: SHIPPED | OUTPATIENT
Start: 2019-03-12 | End: 2020-04-17

## 2019-03-12 RX ORDER — AMLODIPINE BESYLATE 5 MG/1
TABLET ORAL
Qty: 90 TABLET | Refills: 3 | Status: SHIPPED | OUTPATIENT
Start: 2019-03-12 | End: 2020-04-17

## 2019-03-12 RX ORDER — DULOXETIN HYDROCHLORIDE 60 MG/1
60 CAPSULE, DELAYED RELEASE ORAL DAILY
Qty: 90 CAPSULE | Refills: 3 | Status: SHIPPED | OUTPATIENT
Start: 2019-03-12 | End: 2020-04-17

## 2019-03-12 RX ORDER — BUSPIRONE HYDROCHLORIDE 10 MG/1
10 TABLET ORAL 2 TIMES DAILY
Qty: 180 TABLET | Refills: 1 | Status: SHIPPED | OUTPATIENT
Start: 2019-03-12 | End: 2019-10-15

## 2019-03-12 RX ORDER — ALPRAZOLAM 0.25 MG
0.25 TABLET ORAL 3 TIMES DAILY PRN
Qty: 20 TABLET | Refills: 0 | Status: SHIPPED | OUTPATIENT
Start: 2019-03-12 | End: 2020-06-04 | Stop reason: ALTCHOICE

## 2019-03-12 ASSESSMENT — ENCOUNTER SYMPTOMS
PARESTHESIAS: 0
ABDOMINAL PAIN: 0
DYSURIA: 0
CHILLS: 0
HEARTBURN: 0
DIZZINESS: 0
DIARRHEA: 0
PALPITATIONS: 0
HEMATOCHEZIA: 0
HEADACHES: 1
NAUSEA: 0
HEMATURIA: 0
JOINT SWELLING: 0
SORE THROAT: 0
EYE PAIN: 0
FREQUENCY: 0
CONSTIPATION: 0
BREAST MASS: 0
FEVER: 0
WEAKNESS: 1
NERVOUS/ANXIOUS: 1
ARTHRALGIAS: 1
SHORTNESS OF BREATH: 0

## 2019-03-12 ASSESSMENT — MIFFLIN-ST. JEOR: SCORE: 1257.86
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
## 2019-03-12 NOTE — PATIENT INSTRUCTIONS
Preventive Health Recommendations  Female Ages 50 - 64    Yearly exam: See your health care provider every year in order to  o Review health changes.   o Discuss preventive care.    o Review your medicines if your doctor has prescribed any.        If you get Pap tests with HPV test, you only need to test every 5 years, unless you have an abnormal result. You are due in 2020      Have a mammogram every 1 to 2 years.    Have a colonoscopy at age 50, or have a yearly FIT test (stool test). These exams screen for colon cancer.      Have a cholesterol test every year    Have a diabetes test (fasting glucose) every year      Shots: Get a flu shot each year. Get a tetanus shot every 10 years - you are due in 2026.  You can get your shingles shot if you want it.    Nutrition:     Eat at least 5 servings of fruits and vegetables each day.    Eat whole-grain bread, whole-wheat pasta and brown rice instead of white grains and rice.    Get adequate Calcium and Vitamin D.     Lifestyle    Exercise at least 150 minutes a week (30 minutes a day, 5 days a week). This will help you control your weight and prevent disease.    Limit alcohol to one drink per day or less.    No smoking.     Wear sunscreen to prevent skin cancer.     See your dentist every six months for an exam and cleaning.    See your eye doctor every 1 to 2 years.

## 2019-05-15 ENCOUNTER — TELEPHONE (OUTPATIENT)
Dept: PEDIATRICS | Facility: CLINIC | Age: 59
End: 2019-05-15

## 2019-05-15 DIAGNOSIS — I10 ESSENTIAL HYPERTENSION, BENIGN: ICD-10-CM

## 2019-05-16 RX ORDER — LISINOPRIL 40 MG/1
TABLET ORAL
Qty: 90 TABLET | Refills: 3 | Status: SHIPPED | OUTPATIENT
Start: 2019-05-16 | End: 2020-03-15

## 2019-05-16 NOTE — TELEPHONE ENCOUNTER
"Requested Prescriptions   Pending Prescriptions Disp Refills     lisinopril (PRINIVIL/ZESTRIL) 40 MG tablet  Last Written Prescription Date:  7/13/2018  Last Fill Quantity: 90 tablet,  # refills: 3   Last office visit: 3/12/2019 with prescribing provider:  Jennifer Dickerson   Future Office Visit:     90 tablet 3     Sig: Take 1 tablet (40 mg) by mouth daily       ACE Inhibitors (Including Combos) Protocol Passed - 5/15/2019  3:58 PM        Passed - Blood pressure under 140/90 in past 12 months     BP Readings from Last 3 Encounters:   03/12/19 130/60   09/11/18 128/72   07/13/18 132/80             Passed - Recent (12 mo) or future (30 days) visit within the authorizing provider's specialty     Patient had office visit in the last 12 months or has a visit in the next 30 days with authorizing provider or within the authorizing provider's specialty.  See \"Patient Info\" tab in inbasket, or \"Choose Columns\" in Meds & Orders section of the refill encounter.              Passed - Medication is active on med list        Passed - Patient is age 18 or older        Passed - No active pregnancy on record        Passed - Normal serum creatinine on file in past 12 months     Recent Labs   Lab Test 06/28/18  2245   CR 0.63             Passed - Normal serum potassium on file in past 12 months     Recent Labs   Lab Test 06/28/18  2245   POTASSIUM 3.5             Passed - No positive pregnancy test within past 12 months      Prescription approved per [facility] Refill Protocol.  Tejal Dominguez RN    "

## 2020-03-13 DIAGNOSIS — F41.0 PANIC DISORDER WITHOUT AGORAPHOBIA: ICD-10-CM

## 2020-03-13 DIAGNOSIS — I10 ESSENTIAL HYPERTENSION, BENIGN: ICD-10-CM

## 2020-03-13 NOTE — TELEPHONE ENCOUNTER
Called patient- no answer. Lvm to call clinic to schedule an appointment.       Lorraine Vazquez MA

## 2020-03-13 NOTE — TELEPHONE ENCOUNTER
busPIRone (BUSPAR) 10 MG tablet        Last written prescription date: 10/15/19        Last fill quantity: 180, # refills: 1        Last office visit: 3/12/19        Future office visit: N/A        Is this a controlled substance?  No       Routing refill request to provider for review/approval because:    Recent (12 mo) or future (30 days) visit within the authorizing provider's specialty     _____________________________________________      lisinopril (PRINIVIL/ZESTRIL) 40 MG tablet        Last written prescription date: 5/16/19        Last fill quantity: 90, # refills: 3        Last office visit: 3/12/19        Future office visit: N/A        Is this a controlled substance?  No       Routing refill request to provider for review/approval because:    Normal serum creatinine on file in past 12 months    Normal serum potassium on file in past 12 months      Blood pressure under 140/90 in past 12 months     Creatinine   Date Value Ref Range Status   06/28/2018 0.63 0.52 - 1.04 mg/dL Final     Potassium   Date Value Ref Range Status   06/28/2018 3.5 3.4 - 5.3 mmol/L Final     Marina ISAAC RN, BSN

## 2020-03-15 RX ORDER — BUSPIRONE HYDROCHLORIDE 10 MG/1
10 TABLET ORAL 2 TIMES DAILY
Qty: 180 TABLET | Refills: 0 | Status: SHIPPED | OUTPATIENT
Start: 2020-03-15 | End: 2020-04-17

## 2020-03-15 RX ORDER — LISINOPRIL 40 MG/1
TABLET ORAL
Qty: 30 TABLET | Refills: 0 | Status: SHIPPED | OUTPATIENT
Start: 2020-03-15 | End: 2020-04-17

## 2020-03-15 NOTE — TELEPHONE ENCOUNTER
She didn't do her labs last year either, so must schedule and do fasting labs before further refill.  Can wait to see me until summer.

## 2020-03-16 NOTE — TELEPHONE ENCOUNTER
The pt is aware of the providers message. Her appointment is scheduled and she will call back to schedule her lab work.   Dora Richardson on 3/16/2020 at 2:48 PM

## 2020-03-19 DIAGNOSIS — I10 ESSENTIAL HYPERTENSION, BENIGN: ICD-10-CM

## 2020-03-19 DIAGNOSIS — E78.5 HYPERLIPIDEMIA LDL GOAL <160: ICD-10-CM

## 2020-03-19 PROCEDURE — 80048 BASIC METABOLIC PNL TOTAL CA: CPT | Performed by: INTERNAL MEDICINE

## 2020-03-19 PROCEDURE — 36415 COLL VENOUS BLD VENIPUNCTURE: CPT | Performed by: INTERNAL MEDICINE

## 2020-03-19 PROCEDURE — 80061 LIPID PANEL: CPT | Performed by: INTERNAL MEDICINE

## 2020-03-20 LAB
ANION GAP SERPL CALCULATED.3IONS-SCNC: 5 MMOL/L (ref 3–14)
BUN SERPL-MCNC: 11 MG/DL (ref 7–30)
CALCIUM SERPL-MCNC: 8.4 MG/DL (ref 8.5–10.1)
CHLORIDE SERPL-SCNC: 102 MMOL/L (ref 94–109)
CHOLEST SERPL-MCNC: 169 MG/DL
CO2 SERPL-SCNC: 27 MMOL/L (ref 20–32)
CREAT SERPL-MCNC: 0.64 MG/DL (ref 0.52–1.04)
GFR SERPL CREATININE-BSD FRML MDRD: >90 ML/MIN/{1.73_M2}
GLUCOSE SERPL-MCNC: 112 MG/DL (ref 70–99)
HDLC SERPL-MCNC: 63 MG/DL
LDLC SERPL CALC-MCNC: 86 MG/DL
NONHDLC SERPL-MCNC: 106 MG/DL
POTASSIUM SERPL-SCNC: 4.2 MMOL/L (ref 3.4–5.3)
SODIUM SERPL-SCNC: 134 MMOL/L (ref 133–144)
TRIGL SERPL-MCNC: 101 MG/DL

## 2020-04-15 ENCOUNTER — OFFICE VISIT (OUTPATIENT)
Dept: PEDIATRICS | Facility: CLINIC | Age: 60
End: 2020-04-15

## 2020-04-15 ENCOUNTER — TELEPHONE (OUTPATIENT)
Dept: PEDIATRICS | Facility: CLINIC | Age: 60
End: 2020-04-15

## 2020-04-15 VITALS
SYSTOLIC BLOOD PRESSURE: 157 MMHG | OXYGEN SATURATION: 100 % | TEMPERATURE: 97.2 F | DIASTOLIC BLOOD PRESSURE: 89 MMHG | WEIGHT: 166 LBS | BODY MASS INDEX: 29.88 KG/M2 | HEART RATE: 95 BPM

## 2020-04-15 DIAGNOSIS — M54.50 LEFT LOW BACK PAIN, UNSPECIFIED CHRONICITY, UNSPECIFIED WHETHER SCIATICA PRESENT: Primary | ICD-10-CM

## 2020-04-15 PROCEDURE — 99213 OFFICE O/P EST LOW 20 MIN: CPT | Performed by: INTERNAL MEDICINE
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
## 2020-04-15 NOTE — PATIENT INSTRUCTIONS
Please follow up with your primary care provider in one week, sooner if any increasing, worsening or new symptoms as discussed.

## 2020-04-15 NOTE — TELEPHONE ENCOUNTER
"Spoke to patient:    Started Monday morning. Low back pain around tail bone and left gluteous    Day 1: could hardly get out of bed  Day 2: slightly better and patient was able to walk but had to hunched over  Day 3: patient still feels like she needs to be hunched over but not as far  *pain is still severe when walking    Patient states that the pain will shoot down her left leg.    Patient denies any heavy lifting or extraneous exercise prior to injury.    Patient has tried ice and heating. Patient states that heating helps the most. Patient has tried orphenadrine, tylenol and naproxen with some relief.    Patient is unable to stand up straight without severe pain. Patient states pain is a 5 when laying still and when up walking it hurts so much she has \"a hard time catching my breath\"    Patient denies fever, nausea, cough, pain or blood in urine.     Recommend patient be seen with [facility] provider. Patient requesting appointment in [place] since Dr. Dickerson is not seeing patients in clinic. Patient is ok with coming to the [place] clinic if [place] is not an option.  Marina ISAAC RN, BSN    "

## 2020-04-15 NOTE — PROGRESS NOTES
Escorting patient to exit to [facility] on 2nd floor, while walking her she slams her body against the wall. I call out for help thinking she was falling down to floor due to pain. Called out for help so someone can watch her while I get wheelchair, as that was happening Patricia FIGUEROA comes to help and sees patient deny help and on wall. As we offer help together she walks away on her own. Violetta Hahn CMA on 4/15/2020 at 4:14 PM

## 2020-04-15 NOTE — TELEPHONE ENCOUNTER
Reason for call:  Other   Patient called regarding (reason for call): call back  Additional comments: Patient states she is having pain on her left side that travels between her back and her leg, affecting mobility. It has been present for about 3 days, no other symptoms. She would like to speak with a nurse or Dr Dickerson.     Phone number to reach patient:  Home number on file [phone] (home)    Best Time:  Any    Can we leave a detailed message on this number?  YES    Travel screening: Not Applicable

## 2020-04-16 ENCOUNTER — HOSPITAL ENCOUNTER (EMERGENCY)
Facility: CLINIC | Age: 60
Discharge: HOME | End: 2020-04-16
Attending: PHYSICIAN ASSISTANT | Admitting: PHYSICIAN ASSISTANT

## 2020-04-16 ENCOUNTER — APPOINTMENT (OUTPATIENT)
Dept: GENERAL RADIOLOGY | Facility: CLINIC | Age: 60
End: 2020-04-16
Attending: PHYSICIAN ASSISTANT

## 2020-04-16 ENCOUNTER — APPOINTMENT (OUTPATIENT)
Dept: ULTRASOUND IMAGING | Facility: CLINIC | Age: 60
End: 2020-04-16
Attending: PHYSICIAN ASSISTANT

## 2020-04-16 VITALS
OXYGEN SATURATION: 97 % | DIASTOLIC BLOOD PRESSURE: 91 MMHG | HEART RATE: 79 BPM | SYSTOLIC BLOOD PRESSURE: 147 MMHG | RESPIRATION RATE: 18 BRPM | TEMPERATURE: 98.5 F

## 2020-04-16 DIAGNOSIS — M79.662 PAIN OF LEFT LOWER LEG: ICD-10-CM

## 2020-04-16 DIAGNOSIS — G89.29 CHRONIC BILATERAL LOW BACK PAIN WITHOUT SCIATICA: ICD-10-CM

## 2020-04-16 DIAGNOSIS — M62.838 MUSCLE SPASM OF LEFT LOWER EXTREMITY: ICD-10-CM

## 2020-04-16 DIAGNOSIS — M54.50 CHRONIC BILATERAL LOW BACK PAIN WITHOUT SCIATICA: ICD-10-CM

## 2020-04-16 DIAGNOSIS — M16.12 OSTEOARTHRITIS OF LEFT HIP, UNSPECIFIED OSTEOARTHRITIS TYPE: ICD-10-CM

## 2020-04-16 LAB
ALBUMIN SERPL-MCNC: 4.3 G/DL (ref 3.4–5)
ALP SERPL-CCNC: 92 U/L (ref 40–150)
ALT SERPL W P-5'-P-CCNC: 29 U/L (ref 0–50)
ANION GAP SERPL CALCULATED.3IONS-SCNC: 8 MMOL/L (ref 3–14)
AST SERPL W P-5'-P-CCNC: 18 U/L (ref 0–45)
BASOPHILS # BLD AUTO: 0 10E9/L (ref 0–0.2)
BASOPHILS NFR BLD AUTO: 0.7 %
BILIRUB SERPL-MCNC: 0.3 MG/DL (ref 0.2–1.3)
BUN SERPL-MCNC: 7 MG/DL (ref 7–30)
CALCIUM SERPL-MCNC: 9.2 MG/DL (ref 8.5–10.1)
CHLORIDE SERPL-SCNC: 104 MMOL/L (ref 94–109)
CO2 SERPL-SCNC: 26 MMOL/L (ref 20–32)
CREAT SERPL-MCNC: 0.68 MG/DL (ref 0.52–1.04)
DIFFERENTIAL METHOD BLD: NORMAL
EOSINOPHIL # BLD AUTO: 0.2 10E9/L (ref 0–0.7)
EOSINOPHIL NFR BLD AUTO: 2.8 %
ERYTHROCYTE [DISTWIDTH] IN BLOOD BY AUTOMATED COUNT: 13.1 % (ref 10–15)
GFR SERPL CREATININE-BSD FRML MDRD: >90 ML/MIN/{1.73_M2}
GLUCOSE SERPL-MCNC: 117 MG/DL (ref 70–99)
HCT VFR BLD AUTO: 44.9 % (ref 35–47)
HGB BLD-MCNC: 14.6 G/DL (ref 11.7–15.7)
IMM GRANULOCYTES # BLD: 0 10E9/L (ref 0–0.4)
IMM GRANULOCYTES NFR BLD: 0.5 %
LYMPHOCYTES # BLD AUTO: 1.5 10E9/L (ref 0.8–5.3)
LYMPHOCYTES NFR BLD AUTO: 26.9 %
MAGNESIUM SERPL-MCNC: 2.2 MG/DL (ref 1.6–2.3)
MCH RBC QN AUTO: 30.8 PG (ref 26.5–33)
MCHC RBC AUTO-ENTMCNC: 32.5 G/DL (ref 31.5–36.5)
MCV RBC AUTO: 95 FL (ref 78–100)
MONOCYTES # BLD AUTO: 0.4 10E9/L (ref 0–1.3)
MONOCYTES NFR BLD AUTO: 7 %
NEUTROPHILS # BLD AUTO: 3.5 10E9/L (ref 1.6–8.3)
NEUTROPHILS NFR BLD AUTO: 62.1 %
NRBC # BLD AUTO: 0 10*3/UL
NRBC BLD AUTO-RTO: 0 /100
PLATELET # BLD AUTO: 355 10E9/L (ref 150–450)
POTASSIUM SERPL-SCNC: 3.9 MMOL/L (ref 3.4–5.3)
PROT SERPL-MCNC: 7.6 G/DL (ref 6.8–8.8)
RBC # BLD AUTO: 4.74 10E12/L (ref 3.8–5.2)
SODIUM SERPL-SCNC: 138 MMOL/L (ref 133–144)
WBC # BLD AUTO: 5.7 10E9/L (ref 4–11)

## 2020-04-16 PROCEDURE — 25000132 ZZH RX MED GY IP 250 OP 250 PS 637: Performed by: PHYSICIAN ASSISTANT

## 2020-04-16 PROCEDURE — 99285 EMERGENCY DEPT VISIT HI MDM: CPT | Mod: 25

## 2020-04-16 PROCEDURE — 96374 THER/PROPH/DIAG INJ IV PUSH: CPT

## 2020-04-16 PROCEDURE — 73502 X-RAY EXAM HIP UNI 2-3 VIEWS: CPT

## 2020-04-16 PROCEDURE — 85025 COMPLETE CBC W/AUTO DIFF WBC: CPT | Performed by: PHYSICIAN ASSISTANT

## 2020-04-16 PROCEDURE — 80053 COMPREHEN METABOLIC PANEL: CPT | Performed by: PHYSICIAN ASSISTANT

## 2020-04-16 PROCEDURE — 83735 ASSAY OF MAGNESIUM: CPT | Performed by: PHYSICIAN ASSISTANT

## 2020-04-16 PROCEDURE — 93971 EXTREMITY STUDY: CPT | Mod: LT

## 2020-04-16 PROCEDURE — 25000128 H RX IP 250 OP 636: Performed by: PHYSICIAN ASSISTANT

## 2020-04-16 RX ORDER — CYCLOBENZAPRINE HCL 10 MG
10 TABLET ORAL 3 TIMES DAILY PRN
Qty: 8 TABLET | Refills: 0 | Status: SHIPPED | OUTPATIENT
Start: 2020-04-16 | End: 2020-04-17 | Stop reason: ALTCHOICE

## 2020-04-16 RX ORDER — HYDROCODONE BITARTRATE AND ACETAMINOPHEN 5; 325 MG/1; MG/1
1 TABLET ORAL EVERY 6 HOURS PRN
Qty: 5 TABLET | Refills: 0 | Status: SHIPPED | OUTPATIENT
Start: 2020-04-16 | End: 2020-04-17

## 2020-04-16 RX ORDER — CYCLOBENZAPRINE HCL 10 MG
10 TABLET ORAL ONCE
Status: COMPLETED | OUTPATIENT
Start: 2020-04-16 | End: 2020-04-16

## 2020-04-16 RX ORDER — HYDROMORPHONE HYDROCHLORIDE 1 MG/ML
0.5 INJECTION, SOLUTION INTRAMUSCULAR; INTRAVENOUS; SUBCUTANEOUS ONCE
Status: COMPLETED | OUTPATIENT
Start: 2020-04-16 | End: 2020-04-16

## 2020-04-16 RX ORDER — ORPHENADRINE CITRATE 100 MG/1
100 TABLET, EXTENDED RELEASE ORAL 2 TIMES DAILY PRN
Qty: 60 TABLET | Refills: 0 | Status: SHIPPED | OUTPATIENT
Start: 2020-04-16 | End: 2020-04-17 | Stop reason: ALTCHOICE

## 2020-04-16 RX ADMIN — CYCLOBENZAPRINE HYDROCHLORIDE 10 MG: 10 TABLET, FILM COATED ORAL at 13:12

## 2020-04-16 RX ADMIN — HYDROMORPHONE HYDROCHLORIDE 0.5 MG: 1 INJECTION, SOLUTION INTRAMUSCULAR; INTRAVENOUS; SUBCUTANEOUS at 13:56

## 2020-04-16 ASSESSMENT — ENCOUNTER SYMPTOMS
DYSURIA: 0
CONSTIPATION: 0
HEMATURIA: 0
BACK PAIN: 1
DIARRHEA: 0
FREQUENCY: 0
SHORTNESS OF BREATH: 0
NUMBNESS: 1
ABDOMINAL PAIN: 0
COUGH: 0

## 2020-04-16 NOTE — DISCHARGE INSTRUCTIONS
Rest, use ice/ibuprofen/tylenol.   Take Flexeril to see if this will help with some of the leg spasms.   Take a Norco only as needed for breakthrough pain.   We can try to get you into the pain clinic as long as primary care place a referral in the computer.   Otherwise, call orthopedics for follow up.     An Emergency Dept follow up phone appointment has been made for you on  4/17/2020 at 3:20 PM.  Dr Dickerson can make a referral for you to the [facility] in [place].  If you would like to make any changes, please phone them at:   Telephone Visit with Jennifer Dickerson MD   [facility] ([facility])  [address] [place] [address]   [address]   [facility] [address] [phone]    [facility]  Note: this is not an onsite visit; there is no need to come to the facility.  Please have a list of all current medications available for appointment.

## 2020-04-16 NOTE — ED AVS SNAPSHOT
[facility] Emergency Department  [address]  [facility] [address]  Phone:  [phone]  Fax:  [phone]                                    Mariah Mart   MRN: [id number]    Department:  [facility] Emergency Department   Date of Visit:  4/16/2020           After Visit Summary Signature Page    I have received my discharge instructions, and my questions have been answered. I have discussed any challenges I see with this plan with the nurse or doctor.    ..........................................................................................................................................  Patient/Patient Representative Signature      ..........................................................................................................................................  Patient Representative Print Name and Relationship to Patient    ..................................................               ................................................  Date                                   Time    ..........................................................................................................................................  Reviewed by Signature/Title    ...................................................              ..............................................  Date                                               Time          22EPIC Rev 08/18
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
## 2020-04-16 NOTE — TELEPHONE ENCOUNTER
Routing refill request to provider for review/approval because:  Drug not on the [facility] refill protocol     Sharon BLUNTN, RN

## 2020-04-17 ENCOUNTER — VIRTUAL VISIT (OUTPATIENT)
Dept: PEDIATRICS | Facility: CLINIC | Age: 60
End: 2020-04-17

## 2020-04-17 ENCOUNTER — TRANSFERRED RECORDS (OUTPATIENT)
Dept: HEALTH INFORMATION MANAGEMENT | Facility: CLINIC | Age: 60
End: 2020-04-17

## 2020-04-17 DIAGNOSIS — M16.12 PRIMARY OSTEOARTHRITIS OF LEFT HIP: Primary | ICD-10-CM

## 2020-04-17 DIAGNOSIS — E78.5 HYPERLIPIDEMIA LDL GOAL <160: ICD-10-CM

## 2020-04-17 DIAGNOSIS — G44.89 OTHER HEADACHE SYNDROME: ICD-10-CM

## 2020-04-17 DIAGNOSIS — I10 ESSENTIAL HYPERTENSION, BENIGN: ICD-10-CM

## 2020-04-17 DIAGNOSIS — F41.0 PANIC DISORDER WITHOUT AGORAPHOBIA: ICD-10-CM

## 2020-04-17 DIAGNOSIS — M25.552 HIP PAIN, LEFT: ICD-10-CM

## 2020-04-17 PROCEDURE — 99214 OFFICE O/P EST MOD 30 MIN: CPT | Mod: 95 | Performed by: INTERNAL MEDICINE

## 2020-04-17 RX ORDER — METAXALONE 800 MG/1
800 TABLET ORAL 3 TIMES DAILY
Qty: 30 TABLET | Refills: 1 | Status: SHIPPED | OUTPATIENT
Start: 2020-04-17 | End: 2020-05-28

## 2020-04-17 RX ORDER — HYDROCODONE BITARTRATE AND ACETAMINOPHEN 5; 325 MG/1; MG/1
1 TABLET ORAL EVERY 6 HOURS PRN
Qty: 12 TABLET | Refills: 0 | Status: ON HOLD | OUTPATIENT
Start: 2020-04-17 | End: 2020-06-02

## 2020-04-17 RX ORDER — LISINOPRIL 40 MG/1
40 TABLET ORAL DAILY
Qty: 90 TABLET | Refills: 0 | Status: SHIPPED | OUTPATIENT
Start: 2020-04-17 | End: 2020-09-01

## 2020-04-17 RX ORDER — SIMVASTATIN 40 MG
40 TABLET ORAL DAILY
Qty: 90 TABLET | Refills: 1 | Status: SHIPPED | OUTPATIENT
Start: 2020-04-17 | End: 2021-02-04

## 2020-04-17 RX ORDER — AMLODIPINE BESYLATE 5 MG/1
TABLET ORAL
Qty: 90 TABLET | Refills: 0 | Status: SHIPPED | OUTPATIENT
Start: 2020-04-17 | End: 2020-08-04

## 2020-04-17 RX ORDER — DULOXETIN HYDROCHLORIDE 60 MG/1
60 CAPSULE, DELAYED RELEASE ORAL DAILY
Qty: 90 CAPSULE | Refills: 3 | Status: SHIPPED | OUTPATIENT
Start: 2020-04-17 | End: 2021-04-19

## 2020-04-17 RX ORDER — BUSPIRONE HYDROCHLORIDE 10 MG/1
10 TABLET ORAL 2 TIMES DAILY
Qty: 180 TABLET | Refills: 3 | Status: SHIPPED | OUTPATIENT
Start: 2020-04-17 | End: 2021-05-07
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
## 2020-04-20 ENCOUNTER — TRANSFERRED RECORDS (OUTPATIENT)
Dept: HEALTH INFORMATION MANAGEMENT | Facility: CLINIC | Age: 60
End: 2020-04-20

## 2020-04-24 ENCOUNTER — TRANSFERRED RECORDS (OUTPATIENT)
Dept: HEALTH INFORMATION MANAGEMENT | Facility: CLINIC | Age: 60
End: 2020-04-24

## 2020-04-28 ENCOUNTER — VIRTUAL VISIT (OUTPATIENT)
Dept: NEUROSURGERY | Facility: CLINIC | Age: 60
End: 2020-04-28
Attending: INTERNAL MEDICINE

## 2020-04-28 DIAGNOSIS — M54.16 LUMBAR RADICULOPATHY: Primary | ICD-10-CM

## 2020-04-28 DIAGNOSIS — M54.50 LEFT LOW BACK PAIN, UNSPECIFIED CHRONICITY, UNSPECIFIED WHETHER SCIATICA PRESENT: ICD-10-CM

## 2020-04-28 PROCEDURE — 99203 OFFICE O/P NEW LOW 30 MIN: CPT | Mod: 95 | Performed by: PHYSICIAN ASSISTANT

## 2020-04-28 RX ORDER — ACETAMINOPHEN 500 MG
500 TABLET ORAL 2 TIMES DAILY PRN
COMMUNITY
End: 2021-06-18

## 2020-04-28 RX ORDER — IBUPROFEN 800 MG/1
800 TABLET, FILM COATED ORAL EVERY 8 HOURS PRN
COMMUNITY
End: 2021-06-18

## 2020-04-28 NOTE — PROGRESS NOTES
"Per Heriberto Mims PA-C , \"Will you please set up a left L4 TFESI for this patient\"  Order placed. Contacted patient with phone number to schedule. No questions, provided her with our office number to call with questions or concerns.  "

## 2020-04-28 NOTE — PROGRESS NOTES
NEUROSURGERY PHONE CONSULT NOTE     DATE OF VISIT: 4/28/2020     SUBJECTIVE:     Mariah Mart is a pleasant 59 year old female who I spoke to on the phone today for consultation on lumbar spine pain with left leg radiculopathy. She is referred to the Neurosurgery Clinic by Dr. Granados in . Pertinent medical history consists of advanced osteoarthritis of the left hip .   Today, Ms. Mart reports a 10 day history of symptoms. She describes constant, sharp, burning pain that initiates in the midline low lumbar region and radiates distally in what sounds like the left L4 distribution. This pain is accompanied by paresthesias, numbness and left leg spasms in the same distribution. Prolonged walking, weight bearing and left leg movement aggravate the symptoms, while alleviation is obtained by medications. No mechanism of injury such as trauma or a fall is associated with the onset of the pain. There are no bowel or bladder changes. She denies saddle anesthesia. She denies changes in gait, instability, or falling episodes. There has been no significant change in her handwriting or hand dexterity.  The patient has not participated in any conservative therapies. The patient does not smoke cigarettes.       Current Outpatient Medications:      acetaminophen (TYLENOL) 500 MG tablet, Take 500 mg by mouth 2 times daily as needed for mild pain, Disp: , Rfl:      ALPRAZolam (XANAX) 0.25 MG tablet, Take 1 tablet (0.25 mg) by mouth 3 times daily as needed for anxiety, Disp: 20 tablet, Rfl: 0     amLODIPine (NORVASC) 5 MG tablet, Take 1 tablet (5 mg) by mouth daily, Disp: 90 tablet, Rfl: 0     busPIRone (BUSPAR) 10 MG tablet, Take 1 tablet (10 mg) by mouth 2 times daily, Disp: 180 tablet, Rfl: 3     Calcium-Magnesium-Vitamin D 500-250-200 MG-MG-UNIT TABS, Take 2 tablets by mouth At Bedtime, Disp: , Rfl:      DULoxetine (CYMBALTA) 60 MG capsule, Take 1 capsule (60 mg) by mouth daily, Disp: 90 capsule, Rfl: 3      HYDROcodone-acetaminophen (NORCO) 5-325 MG tablet, Take 1 tablet by mouth every 6 hours as needed for severe pain, Disp: 12 tablet, Rfl: 0     ibuprofen (ADVIL/MOTRIN) 800 MG tablet, Take 800 mg by mouth every 8 hours as needed for moderate pain, Disp: , Rfl:      LANsoprazole (PREVACID) 30 MG CR capsule, Take 30 mg by mouth At Bedtime, Disp: , Rfl:      lisinopril (ZESTRIL) 40 MG tablet, Take 1 tablet (40 mg) by mouth daily, Disp: 90 tablet, Rfl: 0     MULTIVITAMIN TABS   OR, , Disp: , Rfl:      simvastatin (ZOCOR) 40 MG tablet, Take 1 tablet (40 mg) by mouth daily, Disp: 90 tablet, Rfl: 1     Boswellia-Glucosamine-Vit D (GLUCOSAMINE COMPLEX PO), Take 2 tablets by mouth At Bedtime , Disp: , Rfl:      metaxalone (SKELAXIN) 800 MG tablet, Take 1 tablet (800 mg) by mouth 3 times daily (Patient not taking: Reported on 4/28/2020), Disp: 30 tablet, Rfl: 1     Allergies   Allergen Reactions     Gabapentin Other (See Comments)     Pt feels out of it        Past Medical History:   Diagnosis Date     Essential hypertension, benign      Hypopotassemia     on diuretics     Internal hemorrhoids     ligated     Intractable headache, unspecified chronicity pattern, unspecified headache type 4/18/2017     Mixed hyperlipidemia      Obesity 12/28/2010        ROS: 10 point review of symptoms are negative other than the symptoms noted above in the HPI.     Family History has been reviewed with the patient, there are no pertinent findings to presenting concern.     Past Surgical History:   Procedure Laterality Date     LAPAROSCOPIC APPENDECTOMY N/A 1/10/2018    Procedure: LAPAROSCOPIC APPENDECTOMY;  LAPAROSCOPIC APPENDECTOMY ;  Surgeon: Eugenia De La Rosa MD;  Location:  OR     TONSILLECTOMY & ADENOIDECTOMY          Social History     Tobacco Use     Smoking status: Never Smoker     Smokeless tobacco: Never Used   Substance Use Topics     Alcohol use: Yes     Comment: 1 mixed drink per month     Drug use: No        OBJECTIVE:    LMP 05/30/2007    There is no height or weight on file to calculate BMI.     Imaging: Findings, per radiologist read, notable for:      PELVIS AND HIP LEFT ONE VIEW April 16, 2020 2:13 PM     No acute bony abnormality. There is advanced osteoarthritis of the left hip and moderate osteoarthritis of the right hip. This has progressed bilaterally.    Lumbar Spine MRI was also reviewed with the patient.     Full radiological report in chart. Imaging reviewed with with patient today.     ASSESSMENT/PLAN:     Mariah Mart is a 59 year old female who presents to the clinic for consultation on lumbar spine pain with left leg radiculopathy. Pertinent medical history consists of advanced osteoarthritis of the left hip.   The patient's most recent imaging was reviewed with her today. It was explained that images show foraminal stenosis at the left L4-5 level, which correlates to today's clinical examination. The patient has not attempted conservative management.     It was suggested that Ms. Mart may need a left ANGELICA, however, she would like to rule out any lumbar pathology prior to proceeding with a ANGELICA, this is certainly reasonable.     We feel that it would be in her best interest to try a conservative approach by obtaining a transforaminal epidural steroid injection at the left L4 level which she would like to proceed with.     We would like to talk to her after she has obtained her ZACHARY as needed to further discuss possible surgical interventions or other conservative therapies. In the event that patient's symptoms worsen or change we would like to see her sooner. We also discussed signs of a worsening problem that she should seek being evaluated.        Respectfully,     RASHARD Mancilla, NOY     Exam, imaging, and plan reviewed by Dr. Damon.     25 minutes

## 2020-04-30 ENCOUNTER — TELEPHONE (OUTPATIENT)
Dept: MEDSURG UNIT | Facility: CLINIC | Age: 60
End: 2020-04-30

## 2020-05-01 ENCOUNTER — HOSPITAL ENCOUNTER (OUTPATIENT)
Facility: CLINIC | Age: 60
Discharge: HOME | End: 2020-05-01
Admitting: PHYSICIAN ASSISTANT
Payer: COMMERCIAL

## 2020-05-01 ENCOUNTER — HOSPITAL ENCOUNTER (OUTPATIENT)
Dept: GENERAL RADIOLOGY | Facility: CLINIC | Age: 60
End: 2020-05-01
Attending: PHYSICIAN ASSISTANT
Payer: COMMERCIAL

## 2020-05-01 VITALS
HEART RATE: 80 BPM | OXYGEN SATURATION: 98 % | TEMPERATURE: 97.1 F | RESPIRATION RATE: 18 BRPM | DIASTOLIC BLOOD PRESSURE: 75 MMHG | SYSTOLIC BLOOD PRESSURE: 129 MMHG

## 2020-05-01 DIAGNOSIS — M54.16 LUMBAR RADICULOPATHY: ICD-10-CM

## 2020-05-01 PROCEDURE — 40000863 ZZH STATISTIC RADIOLOGY XRAY, US, CT, MAR, NM

## 2020-05-01 PROCEDURE — 25500064 ZZH RX 255 OP 636: Performed by: PHYSICIAN ASSISTANT

## 2020-05-01 PROCEDURE — 25000125 ZZHC RX 250: Performed by: PHYSICIAN ASSISTANT

## 2020-05-01 PROCEDURE — 62323 NJX INTERLAMINAR LMBR/SAC: CPT

## 2020-05-01 PROCEDURE — 25000128 H RX IP 250 OP 636: Performed by: PHYSICIAN ASSISTANT

## 2020-05-01 RX ORDER — LIDOCAINE HYDROCHLORIDE 10 MG/ML
30 INJECTION, SOLUTION EPIDURAL; INFILTRATION; INTRACAUDAL; PERINEURAL ONCE
Status: COMPLETED | OUTPATIENT
Start: 2020-05-01 | End: 2020-05-01

## 2020-05-01 RX ORDER — IOPAMIDOL 408 MG/ML
10 INJECTION, SOLUTION INTRATHECAL ONCE
Status: COMPLETED | OUTPATIENT
Start: 2020-05-01 | End: 2020-05-01

## 2020-05-01 RX ORDER — DEXAMETHASONE SODIUM PHOSPHATE 10 MG/ML
20 INJECTION, SOLUTION INTRAMUSCULAR; INTRAVENOUS ONCE
Status: COMPLETED | OUTPATIENT
Start: 2020-05-01 | End: 2020-05-01

## 2020-05-01 RX ADMIN — DEXAMETHASONE SODIUM PHOSPHATE 20 MG: 10 INJECTION, SOLUTION INTRAMUSCULAR; INTRAVENOUS at 14:18

## 2020-05-01 RX ADMIN — LIDOCAINE HYDROCHLORIDE 6 ML: 10 INJECTION, SOLUTION EPIDURAL; INFILTRATION; INTRACAUDAL; PERINEURAL at 14:18

## 2020-05-01 RX ADMIN — IOPAMIDOL 1 ML: 408 INJECTION, SOLUTION INTRATHECAL at 14:15

## 2020-05-01 NOTE — PROGRESS NOTES
Care Suites Post Procedure Note    Patient Information  Name: Mariah Mart  Age: 59 year old    Post Procedure  Procedure: epidural  Time patient returned to Care Suites: 1435  Concerns/abnormal assessment: VSS. Pain down to 4 from 9. Left back and leg. Site left lower back is CDI with bandaid. Given beverage and crackers.   If abnormal assessment, provider notified: N/A  Plan/Other: per orders    Alissa Foster RN     1500 Care Suites Discharge Nursing Note    Patient Information  Name: Mariah Mart  Age: 59 year old    Discharge Education:  Discharge instructions reviewed: Yes  Additional education/resources provided: na  Patient/patient representative verbalizes understanding: Yes  Patient discharging on new medications: No  Medication education completed: N/A    Discharge Plans:   Discharge location: home  Discharge ride contacted: Yes  Approximate discharge time: 1505    Discharge Criteria:  Discharge criteria met and vital signs stable: Yes. Site CDI with bandaid. Pain down to 2    Patient Belongs:  Patient belongings returned to patient: Yes    Alissa Foster RN

## 2020-05-01 NOTE — DISCHARGE INSTRUCTIONS
Steroid Injection Discharge Instructions     After you go home:      You may resume your normal diet.    Care of Puncture Site:      If you have a bandaid on your puncture site, you may remove it the next morning    You may shower tomorrow    No bath tubs, whirlpools or swimming for at least 3 days     Activity:      You may go back to normal activity in 24 hours    You should let pain be your guide as to the extent of your activities    Maintain any activity limitations as ordered by your provider    Do NOT drive a vehicle if you develop numbness in your arm or leg    Medicines:      You may resume all medications    Resume your Warfarin/Coumadin at your regular dose today. Follow up with your provider to have your INR rechecked    Resume your Platelet Inhibitors and Aspirin tomorrow at your regular dose    For minor pain, you may take Acetaminophen (Tylenol) or Ibuprofen (Advil)    Pain:       You may experience increased or different pain over the next 24-48 hours    For the next 48 hrs - you may use ice packs for discomfort     Call your primary care doctor if:      You have severe pain that does not improve with pain medication    You have chills or a fever greater than 101 F (38 C)    The site is red, swollen, hot or tender    New problems with your bowel or bladder    Any questions or concerns    Other Instructions:      New numbness down your leg post injection is temporary and may last for up to 6 hours. You may need assistance with activity until your leg has normal sensation.    If you are diabetic, monitor your blood sugar closely. Contact the provider who manages your diabetes to help you control your blood sugar if needed.    For Your Information:      A steroid was injected to help decrease swelling and may help to reduce pain. It may take up to 7-10 days to obtain full results.    Some patients will get lasting relief from a single injection. Others may require up to 3 injections to get results. If  you have more than one steroid injection, they should be given 2 weeks apart.    Side effects of your steroid injection are mild and will go away in 2-3 days  - Insomnia  - Heartburn  - Flushed face  - Water retention  - Increased appetite  - Increased blood sugar      If you have questions call:        [facility] Radiology Dept @ [phone]      The provider who performed your procedure was Dr Ruiz or PA

## 2020-05-01 NOTE — PROCEDURES
[facility]    Procedure: Left L4-L5 TFESI    Date/Time: 5/1/2020 2:43 PM  Performed by: Julian Mcqueen PA-C  Authorized by: Julian Mcqueen PA-C   IR Fellow Physician: NOY Mcqueen    UNIVERSAL PROTOCOL   Site Marked: Yes  Prior Images Obtained and Reviewed:  Yes  Required items: Required blood products, implants, devices and special equipment available    Patient identity confirmed:  Verbally with patient  Patient was reevaluated immediately before administering moderate or deep sedation or anesthesia  Confirmation Checklist:  Patient's identity using two indicators, relevant allergies, procedure was appropriate and matched the consent or emergent situation and correct equipment/implants were available  Time out: Immediately prior to the procedure a time out was called    Universal Protocol: the Joint Commission Universal Protocol was followed    Preparation: Patient was prepped and draped in usual sterile fashion           ANESTHESIA    Local Anesthetic: Lidocaine 1% without epinephrine      SEDATION    Patient Sedated: No    See dictated procedure note for full details.  PROCEDURE   Patient Tolerance:  Patient tolerated the procedure well with no immediate complications  Describe Procedure: Left leg pain with the injection down to her toes.  Pt reports it is the same distribution as the previous pain.  Length of time physician/provider present for 1:1 monitoring during sedation: 0

## 2020-05-01 NOTE — PROGRESS NOTES
PATIENT WELLNESS SCREENING    Step 1: Answer all screening questions 1-3.    1. In the last month, have you been in contact with someone who was confirmed or suspected to have Coronavirus/COVID-19? No    2. Do you have the following symptoms?   Fever? No   Cough? No   Shortness of breath? No   Skin rash? No    3. Have you traveled internationally in the last month? No   If so, where? na    Step 2: Refer to logic grid below for actions    NO SYMPTOM(S)    ACTIONS:  1. Standard rooming process  2. Provider to assess per normal protocol    POSITIVE SYMPTOM(S)  If positive for ANY of the following symptoms: fever, cough, shortness of breath, rash    ACTIONS  1. Mask patient  2. Room patient as soon as possible  3. Don appropriate PPE when entering room  4. Provider evaluation  Wearing mask    Care Suites Admission Nursing Note    Patient Information  Name: Mariah Mart  Age: 59 year old  Reason for admission: epidural lumbar injection  Care Suites arrival time: 1315    Patient Admission/Assessment   Pre-procedure assessment complete: Yes  If abnormal assessment/labs, provider notified: N/A  NPO: N/A  Medications held per instructions/orders: Yes  Consent: obtained per md  If applicable, pregnancy test status: declined  Patient oriented to room: Yes  Education/questions answered: Yes, reviewed avs discharge with pt and copy given. Explained procedure  Plan/other: per orders    Discharge Planning  Accompanied by: manuel will pick her up and be with her 6 hrs after  Discharge name/phone number: [phone]  Overnight post sedation caregiver: manuel  Discharge location: home    Alissa Foster RN

## 2020-05-04 DIAGNOSIS — Z20.822 ENCOUNTER FOR LABORATORY TESTING FOR COVID-19 VIRUS: Primary | ICD-10-CM

## 2020-05-13 ENCOUNTER — OFFICE VISIT (OUTPATIENT)
Dept: PEDIATRICS | Facility: CLINIC | Age: 60
End: 2020-05-13
Payer: COMMERCIAL

## 2020-05-13 VITALS
TEMPERATURE: 98 F | OXYGEN SATURATION: 100 % | BODY MASS INDEX: 29.48 KG/M2 | SYSTOLIC BLOOD PRESSURE: 129 MMHG | DIASTOLIC BLOOD PRESSURE: 78 MMHG | HEART RATE: 93 BPM | WEIGHT: 163.8 LBS

## 2020-05-13 DIAGNOSIS — Z01.818 PREOP GENERAL PHYSICAL EXAM: Primary | ICD-10-CM

## 2020-05-13 DIAGNOSIS — M16.12 PRIMARY OSTEOARTHRITIS OF LEFT HIP: ICD-10-CM

## 2020-05-13 DIAGNOSIS — Z12.31 ENCOUNTER FOR SCREENING MAMMOGRAM FOR BREAST CANCER: ICD-10-CM

## 2020-05-13 LAB
HGB BLD-MCNC: 13 G/DL (ref 11.7–15.7)
MRSA DNA SPEC QL NAA+PROBE: NEGATIVE
SPECIMEN SOURCE: NORMAL

## 2020-05-13 PROCEDURE — 85018 HEMOGLOBIN: CPT | Performed by: NURSE PRACTITIONER

## 2020-05-13 PROCEDURE — 87641 MR-STAPH DNA AMP PROBE: CPT | Performed by: NURSE PRACTITIONER

## 2020-05-13 PROCEDURE — 99215 OFFICE O/P EST HI 40 MIN: CPT | Performed by: NURSE PRACTITIONER

## 2020-05-13 PROCEDURE — 93000 ELECTROCARDIOGRAM COMPLETE: CPT | Performed by: NURSE PRACTITIONER

## 2020-05-13 PROCEDURE — 36415 COLL VENOUS BLD VENIPUNCTURE: CPT | Performed by: NURSE PRACTITIONER

## 2020-05-13 PROCEDURE — 87640 STAPH A DNA AMP PROBE: CPT | Mod: 59 | Performed by: NURSE PRACTITIONER

## 2020-05-13 PROCEDURE — 80048 BASIC METABOLIC PNL TOTAL CA: CPT | Performed by: NURSE PRACTITIONER
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
## 2020-05-13 NOTE — PATIENT INSTRUCTIONS
Continue to avoid all aspirin and NSAIDs prior to your surgery, use tylenol instead.   Remember to HOLD your lisinopril the day before surgery.   You can take all other medications the night prior to/morning of surgery. I recommend continuing your amlodipine 5 mg to avoid elevated blood pressure.     Before Your Surgery      Call your surgeon if there is any change in your health. This includes signs of a cold or flu (such as a sore throat, runny nose, cough, rash or fever).    Do not smoke, drink alcohol or take over the counter medicine (unless your surgeon or primary care doctor tells you to) for the 24 hours before and after surgery.    If you take prescribed drugs: Follow your doctor s orders about which medicines to take and which to stop until after surgery.    Eating and drinking prior to surgery: follow the instructions from your surgeon    Take a shower or bath the night before surgery. Use the soap your surgeon gave you to gently clean your skin. If you do not have soap from your surgeon, use your regular soap. Do not shave or scrub the surgery site.  Wear clean pajamas and have clean sheets on your bed.

## 2020-05-14 LAB
ANION GAP SERPL CALCULATED.3IONS-SCNC: 5 MMOL/L (ref 3–14)
BUN SERPL-MCNC: 8 MG/DL (ref 7–30)
CALCIUM SERPL-MCNC: 8.7 MG/DL (ref 8.5–10.1)
CHLORIDE SERPL-SCNC: 105 MMOL/L (ref 94–109)
CO2 SERPL-SCNC: 28 MMOL/L (ref 20–32)
CREAT SERPL-MCNC: 0.54 MG/DL (ref 0.52–1.04)
GFR SERPL CREATININE-BSD FRML MDRD: >90 ML/MIN/{1.73_M2}
GLUCOSE SERPL-MCNC: 91 MG/DL (ref 70–99)
POTASSIUM SERPL-SCNC: 3.9 MMOL/L (ref 3.4–5.3)
SODIUM SERPL-SCNC: 138 MMOL/L (ref 133–144)

## 2020-05-27 ASSESSMENT — MIFFLIN-ST. JEOR: SCORE: 1278.49

## 2020-05-28 RX ORDER — MULTIPLE VITAMINS W/ MINERALS TAB 9MG-400MCG
1 TAB ORAL AT BEDTIME
COMMUNITY
End: 2022-08-09

## 2020-05-28 RX ORDER — METAXALONE 800 MG/1
800 TABLET ORAL 3 TIMES DAILY PRN
COMMUNITY
End: 2020-06-08

## 2020-05-28 NOTE — PHARMACY-ADMISSION MEDICATION HISTORY
Medication reconciliation completed by pre-admitting.    Prior to Admission medications    Medication Sig Last Dose Taking? Auth Provider   acetaminophen (TYLENOL) 500 MG tablet Take 500 mg by mouth 2 times daily as needed for mild pain  Yes Reported, Patient   ALPRAZolam (XANAX) 0.25 MG tablet Take 1 tablet (0.25 mg) by mouth 3 times daily as needed for anxiety  Yes Jennifer Dickerson MD   amLODIPine (NORVASC) 5 MG tablet Take 1 tablet (5 mg) by mouth daily  Yes Jennifer Dickerson MD   busPIRone (BUSPAR) 10 MG tablet Take 1 tablet (10 mg) by mouth 2 times daily  Yes Jennifer Dickerson MD   Calcium-Magnesium-Vitamin D 500-250-200 MG-MG-UNIT TABS Take 2 tablets by mouth At Bedtime  Yes Unknown, Entered By History   DULoxetine (CYMBALTA) 60 MG capsule Take 1 capsule (60 mg) by mouth daily  Yes Jennifer Dickerson MD   HYDROcodone-acetaminophen (NORCO) 5-325 MG tablet Take 1 tablet by mouth every 6 hours as needed for severe pain  Yes Jayla, Jennifer RAY MD   ibuprofen (ADVIL/MOTRIN) 800 MG tablet Take 800 mg by mouth every 8 hours as needed for moderate pain  Yes Reported, Patient   LANsoprazole (PREVACID) 30 MG CR capsule Take 30 mg by mouth At Bedtime  Yes Unknown, Entered By History   lisinopril (ZESTRIL) 40 MG tablet Take 1 tablet (40 mg) by mouth daily  Yes Jennifer Dickerson MD   metaxalone (SKELAXIN) 800 MG tablet Take 800 mg by mouth 3 times daily as needed for moderate pain  Yes Unknown, Entered By History   multivitamin w/minerals (THERA-VIT-M) tablet Take 1 tablet by mouth At Bedtime  Yes Unknown, Entered By History   simvastatin (ZOCOR) 40 MG tablet Take 1 tablet (40 mg) by mouth daily  Yes Jennifer Dickerson MD

## 2020-05-29 DIAGNOSIS — Z20.822 ENCOUNTER FOR LABORATORY TESTING FOR COVID-19 VIRUS: ICD-10-CM

## 2020-05-29 PROCEDURE — 99000 SPECIMEN HANDLING OFFICE-LAB: CPT | Performed by: ORTHOPAEDIC SURGERY

## 2020-05-29 PROCEDURE — U0003 INFECTIOUS AGENT DETECTION BY NUCLEIC ACID (DNA OR RNA); SEVERE ACUTE RESPIRATORY SYNDROME CORONAVIRUS 2 (SARS-COV-2) (CORONAVIRUS DISEASE [COVID-19]), AMPLIFIED PROBE TECHNIQUE, MAKING USE OF HIGH THROUGHPUT TECHNOLOGIES AS DESCRIBED BY CMS-2020-01-R: HCPCS | Mod: 90 | Performed by: ORTHOPAEDIC SURGERY

## 2020-05-29 PROCEDURE — 99207 ZZC NO BILLABLE SERVICE THIS VISIT: CPT

## 2020-05-30 LAB
SARS-COV-2 RNA SPEC QL NAA+PROBE: NOT DETECTED
SPECIMEN SOURCE: NORMAL

## 2020-06-01 ENCOUNTER — APPOINTMENT (OUTPATIENT)
Dept: GENERAL RADIOLOGY | Facility: CLINIC | Age: 60
End: 2020-06-01
Attending: ORTHOPAEDIC SURGERY
Payer: COMMERCIAL

## 2020-06-01 ENCOUNTER — ANESTHESIA EVENT (OUTPATIENT)
Dept: SURGERY | Facility: CLINIC | Age: 60
End: 2020-06-01
Payer: COMMERCIAL

## 2020-06-01 ENCOUNTER — APPOINTMENT (OUTPATIENT)
Dept: PHYSICAL THERAPY | Facility: CLINIC | Age: 60
End: 2020-06-01
Attending: ORTHOPAEDIC SURGERY
Payer: COMMERCIAL

## 2020-06-01 ENCOUNTER — ANESTHESIA (OUTPATIENT)
Dept: SURGERY | Facility: CLINIC | Age: 60
End: 2020-06-01
Payer: COMMERCIAL

## 2020-06-01 ENCOUNTER — HOSPITAL ENCOUNTER (OUTPATIENT)
Facility: CLINIC | Age: 60
Discharge: HOME | End: 2020-06-02
Attending: ORTHOPAEDIC SURGERY | Admitting: ORTHOPAEDIC SURGERY
Payer: COMMERCIAL

## 2020-06-01 DIAGNOSIS — Z96.642 STATUS POST TOTAL REPLACEMENT OF LEFT HIP: Primary | ICD-10-CM

## 2020-06-01 PROBLEM — Z96.649 S/P TOTAL HIP ARTHROPLASTY: Status: ACTIVE | Noted: 2020-06-01

## 2020-06-01 LAB
CREAT SERPL-MCNC: 0.54 MG/DL (ref 0.52–1.04)
GFR SERPL CREATININE-BSD FRML MDRD: >90 ML/MIN/{1.73_M2}
PLATELET # BLD AUTO: 339 10E9/L (ref 150–450)

## 2020-06-01 PROCEDURE — 36415 COLL VENOUS BLD VENIPUNCTURE: CPT | Performed by: ORTHOPAEDIC SURGERY

## 2020-06-01 PROCEDURE — 85049 AUTOMATED PLATELET COUNT: CPT | Performed by: ORTHOPAEDIC SURGERY

## 2020-06-01 PROCEDURE — 40000985 XR HIP PORTABLE LEFT 2-3 VIEWS: Mod: TC

## 2020-06-01 PROCEDURE — 27211024 ZZHC OR SUPPLY OTHER OPNP: Performed by: ORTHOPAEDIC SURGERY

## 2020-06-01 PROCEDURE — 97116 GAIT TRAINING THERAPY: CPT | Mod: GP | Performed by: PHYSICAL THERAPIST

## 2020-06-01 PROCEDURE — 99207 ZZC CDG-CODE CATEGORY CHANGED: CPT | Performed by: INTERNAL MEDICINE

## 2020-06-01 PROCEDURE — 97110 THERAPEUTIC EXERCISES: CPT | Mod: GP | Performed by: PHYSICAL THERAPIST

## 2020-06-01 PROCEDURE — 25800030 ZZH RX IP 258 OP 636

## 2020-06-01 PROCEDURE — 82565 ASSAY OF CREATININE: CPT | Performed by: ORTHOPAEDIC SURGERY

## 2020-06-01 PROCEDURE — 25800030 ZZH RX IP 258 OP 636: Performed by: ORTHOPAEDIC SURGERY

## 2020-06-01 PROCEDURE — 27210794 ZZH OR GENERAL SUPPLY STERILE: Performed by: ORTHOPAEDIC SURGERY

## 2020-06-01 PROCEDURE — 25000132 ZZH RX MED GY IP 250 OP 250 PS 637: Performed by: INTERNAL MEDICINE

## 2020-06-01 PROCEDURE — 25000125 ZZHC RX 250: Performed by: PHYSICIAN ASSISTANT

## 2020-06-01 PROCEDURE — 36000063 ZZH SURGERY LEVEL 4 EA 15 ADDTL MIN: Performed by: ORTHOPAEDIC SURGERY

## 2020-06-01 PROCEDURE — 40000306 ZZH STATISTIC PRE PROC ASSESS II: Performed by: ORTHOPAEDIC SURGERY

## 2020-06-01 PROCEDURE — 25000132 ZZH RX MED GY IP 250 OP 250 PS 637: Performed by: PHYSICIAN ASSISTANT

## 2020-06-01 PROCEDURE — 71000012 ZZH RECOVERY PHASE 1 LEVEL 1 FIRST HR: Performed by: ORTHOPAEDIC SURGERY

## 2020-06-01 PROCEDURE — C1713 ANCHOR/SCREW BN/BN,TIS/BN: HCPCS | Performed by: ORTHOPAEDIC SURGERY

## 2020-06-01 PROCEDURE — 99202 OFFICE O/P NEW SF 15 MIN: CPT | Performed by: INTERNAL MEDICINE

## 2020-06-01 PROCEDURE — 25800025 ZZH RX 258: Performed by: ORTHOPAEDIC SURGERY

## 2020-06-01 PROCEDURE — 25000125 ZZHC RX 250

## 2020-06-01 PROCEDURE — 25000128 H RX IP 250 OP 636: Performed by: ANESTHESIOLOGY

## 2020-06-01 PROCEDURE — 97530 THERAPEUTIC ACTIVITIES: CPT | Mod: GP | Performed by: PHYSICAL THERAPIST

## 2020-06-01 PROCEDURE — 25000128 H RX IP 250 OP 636: Performed by: PHYSICIAN ASSISTANT

## 2020-06-01 PROCEDURE — 40000986 XR PELVIS AND HIP LEFT 1 VIEW

## 2020-06-01 PROCEDURE — 25000128 H RX IP 250 OP 636: Performed by: ORTHOPAEDIC SURGERY

## 2020-06-01 PROCEDURE — 36000093 ZZH SURGERY LEVEL 4 1ST 30 MIN: Performed by: ORTHOPAEDIC SURGERY

## 2020-06-01 PROCEDURE — C1776 JOINT DEVICE (IMPLANTABLE): HCPCS | Performed by: ORTHOPAEDIC SURGERY

## 2020-06-01 PROCEDURE — 12000000 ZZH R&B MED SURG/OB

## 2020-06-01 PROCEDURE — 37000009 ZZH ANESTHESIA TECHNICAL FEE, EACH ADDTL 15 MIN: Performed by: ORTHOPAEDIC SURGERY

## 2020-06-01 PROCEDURE — 25000132 ZZH RX MED GY IP 250 OP 250 PS 637: Performed by: ORTHOPAEDIC SURGERY

## 2020-06-01 PROCEDURE — 37000008 ZZH ANESTHESIA TECHNICAL FEE, 1ST 30 MIN: Performed by: ORTHOPAEDIC SURGERY

## 2020-06-01 PROCEDURE — 25800030 ZZH RX IP 258 OP 636: Performed by: ANESTHESIOLOGY

## 2020-06-01 PROCEDURE — 25000128 H RX IP 250 OP 636

## 2020-06-01 PROCEDURE — 97161 PT EVAL LOW COMPLEX 20 MIN: CPT | Mod: GP | Performed by: PHYSICAL THERAPIST

## 2020-06-01 DEVICE — IMPLANTABLE DEVICE
Type: IMPLANTABLE DEVICE | Site: HIP | Status: FUNCTIONAL
Brand: G7® ACETABULAR SYSTEM

## 2020-06-01 DEVICE — IMP SCR ZIM 6.5X20MM ACET CUP SELF TAP 00-6250-065-20: Type: IMPLANTABLE DEVICE | Site: HIP | Status: FUNCTIONAL

## 2020-06-01 DEVICE — IMPLANTABLE DEVICE
Type: IMPLANTABLE DEVICE | Site: HIP | Status: FUNCTIONAL
Brand: G7 ACETABULAR LINER

## 2020-06-01 DEVICE — IMPLANTABLE DEVICE
Type: IMPLANTABLE DEVICE | Site: HIP | Status: FUNCTIONAL
Brand: TAPERLOC COMPLETE PRIMARY FEMORAL

## 2020-06-01 RX ORDER — PROPOFOL 10 MG/ML
INJECTION, EMULSION INTRAVENOUS CONTINUOUS PRN
Status: DISCONTINUED | OUTPATIENT
Start: 2020-06-01 | End: 2020-06-01

## 2020-06-01 RX ORDER — PROPOFOL 10 MG/ML
INJECTION, EMULSION INTRAVENOUS PRN
Status: DISCONTINUED | OUTPATIENT
Start: 2020-06-01 | End: 2020-06-01

## 2020-06-01 RX ORDER — HYDRALAZINE HYDROCHLORIDE 20 MG/ML
2.5-5 INJECTION INTRAMUSCULAR; INTRAVENOUS EVERY 10 MIN PRN
Status: DISCONTINUED | OUTPATIENT
Start: 2020-06-01 | End: 2020-06-01 | Stop reason: HOSPADM

## 2020-06-01 RX ORDER — CELECOXIB 200 MG/1
400 CAPSULE ORAL ONCE
Status: COMPLETED | OUTPATIENT
Start: 2020-06-01 | End: 2020-06-01

## 2020-06-01 RX ORDER — DEXAMETHASONE SODIUM PHOSPHATE 4 MG/ML
4 INJECTION, SOLUTION INTRA-ARTICULAR; INTRALESIONAL; INTRAMUSCULAR; INTRAVENOUS; SOFT TISSUE
Status: DISCONTINUED | OUTPATIENT
Start: 2020-06-01 | End: 2020-06-01 | Stop reason: HOSPADM

## 2020-06-01 RX ORDER — TRANEXAMIC ACID 10 MG/ML
1 INJECTION, SOLUTION INTRAVENOUS ONCE
Status: DISCONTINUED | OUTPATIENT
Start: 2020-06-01 | End: 2020-06-02 | Stop reason: HOSPADM

## 2020-06-01 RX ORDER — ONDANSETRON 4 MG/1
4 TABLET, ORALLY DISINTEGRATING ORAL EVERY 30 MIN PRN
Status: DISCONTINUED | OUTPATIENT
Start: 2020-06-01 | End: 2020-06-01 | Stop reason: HOSPADM

## 2020-06-01 RX ORDER — NALOXONE HYDROCHLORIDE 0.4 MG/ML
.1-.4 INJECTION, SOLUTION INTRAMUSCULAR; INTRAVENOUS; SUBCUTANEOUS
Status: DISCONTINUED | OUTPATIENT
Start: 2020-06-01 | End: 2020-06-02 | Stop reason: HOSPADM

## 2020-06-01 RX ORDER — NEOSTIGMINE METHYLSULFATE 1 MG/ML
VIAL (ML) INJECTION PRN
Status: DISCONTINUED | OUTPATIENT
Start: 2020-06-01 | End: 2020-06-01

## 2020-06-01 RX ORDER — GLYCOPYRROLATE 0.2 MG/ML
INJECTION, SOLUTION INTRAMUSCULAR; INTRAVENOUS PRN
Status: DISCONTINUED | OUTPATIENT
Start: 2020-06-01 | End: 2020-06-01

## 2020-06-01 RX ORDER — ACETAMINOPHEN 325 MG/1
650 TABLET ORAL EVERY 4 HOURS PRN
Status: DISCONTINUED | OUTPATIENT
Start: 2020-06-04 | End: 2020-06-02 | Stop reason: HOSPADM

## 2020-06-01 RX ORDER — ALPRAZOLAM 0.25 MG
0.25 TABLET ORAL 3 TIMES DAILY PRN
Status: DISCONTINUED | OUTPATIENT
Start: 2020-06-01 | End: 2020-06-02 | Stop reason: HOSPADM

## 2020-06-01 RX ORDER — ONDANSETRON 4 MG/1
4 TABLET, ORALLY DISINTEGRATING ORAL EVERY 6 HOURS PRN
Status: DISCONTINUED | OUTPATIENT
Start: 2020-06-01 | End: 2020-06-02 | Stop reason: HOSPADM

## 2020-06-01 RX ORDER — METOCLOPRAMIDE 10 MG/1
10 TABLET ORAL EVERY 6 HOURS PRN
Status: DISCONTINUED | OUTPATIENT
Start: 2020-06-01 | End: 2020-06-02 | Stop reason: HOSPADM

## 2020-06-01 RX ORDER — PANTOPRAZOLE SODIUM 40 MG/1
40 TABLET, DELAYED RELEASE ORAL ONCE
Status: COMPLETED | OUTPATIENT
Start: 2020-06-01 | End: 2020-06-01

## 2020-06-01 RX ORDER — SIMVASTATIN 40 MG
40 TABLET ORAL AT BEDTIME
Status: DISCONTINUED | OUTPATIENT
Start: 2020-06-01 | End: 2020-06-02 | Stop reason: HOSPADM

## 2020-06-01 RX ORDER — BUSPIRONE HYDROCHLORIDE 10 MG/1
10 TABLET ORAL 2 TIMES DAILY
Status: DISCONTINUED | OUTPATIENT
Start: 2020-06-01 | End: 2020-06-02 | Stop reason: HOSPADM

## 2020-06-01 RX ORDER — MECOBALAMIN 5000 MCG
30 TABLET,DISINTEGRATING ORAL AT BEDTIME
Status: DISCONTINUED | OUTPATIENT
Start: 2020-06-01 | End: 2020-06-02 | Stop reason: HOSPADM

## 2020-06-01 RX ORDER — KETOROLAC TROMETHAMINE 30 MG/ML
INJECTION, SOLUTION INTRAMUSCULAR; INTRAVENOUS PRN
Status: DISCONTINUED | OUTPATIENT
Start: 2020-06-01 | End: 2020-06-01

## 2020-06-01 RX ORDER — ONDANSETRON 2 MG/ML
INJECTION INTRAMUSCULAR; INTRAVENOUS PRN
Status: DISCONTINUED | OUTPATIENT
Start: 2020-06-01 | End: 2020-06-01

## 2020-06-01 RX ORDER — KETOROLAC TROMETHAMINE 30 MG/ML
30 INJECTION, SOLUTION INTRAMUSCULAR; INTRAVENOUS
Status: DISCONTINUED | OUTPATIENT
Start: 2020-06-01 | End: 2020-06-01 | Stop reason: HOSPADM

## 2020-06-01 RX ORDER — METOPROLOL TARTRATE 1 MG/ML
1-2 INJECTION, SOLUTION INTRAVENOUS EVERY 5 MIN PRN
Status: DISCONTINUED | OUTPATIENT
Start: 2020-06-01 | End: 2020-06-01 | Stop reason: HOSPADM

## 2020-06-01 RX ORDER — LISINOPRIL 40 MG/1
40 TABLET ORAL DAILY
Status: DISCONTINUED | OUTPATIENT
Start: 2020-06-01 | End: 2020-06-02 | Stop reason: HOSPADM

## 2020-06-01 RX ORDER — CEFAZOLIN SODIUM 1 G/50ML
1 INJECTION, SOLUTION INTRAVENOUS EVERY 8 HOURS
Status: COMPLETED | OUTPATIENT
Start: 2020-06-01 | End: 2020-06-02

## 2020-06-01 RX ORDER — OXYCODONE AND ACETAMINOPHEN 5; 325 MG/1; MG/1
1-2 TABLET ORAL EVERY 4 HOURS PRN
Qty: 60 TABLET | Refills: 0 | Status: SHIPPED | OUTPATIENT
Start: 2020-06-01 | End: 2021-06-18

## 2020-06-01 RX ORDER — HYDROMORPHONE HYDROCHLORIDE 1 MG/ML
.3-.5 INJECTION, SOLUTION INTRAMUSCULAR; INTRAVENOUS; SUBCUTANEOUS EVERY 5 MIN PRN
Status: DISCONTINUED | OUTPATIENT
Start: 2020-06-01 | End: 2020-06-01 | Stop reason: HOSPADM

## 2020-06-01 RX ORDER — BISACODYL 10 MG
10 SUPPOSITORY, RECTAL RECTAL DAILY PRN
Status: DISCONTINUED | OUTPATIENT
Start: 2020-06-01 | End: 2020-06-02 | Stop reason: HOSPADM

## 2020-06-01 RX ORDER — AMLODIPINE BESYLATE 5 MG/1
5 TABLET ORAL DAILY
Status: DISCONTINUED | OUTPATIENT
Start: 2020-06-01 | End: 2020-06-02 | Stop reason: HOSPADM

## 2020-06-01 RX ORDER — FENTANYL CITRATE 50 UG/ML
INJECTION, SOLUTION INTRAMUSCULAR; INTRAVENOUS PRN
Status: DISCONTINUED | OUTPATIENT
Start: 2020-06-01 | End: 2020-06-01

## 2020-06-01 RX ORDER — SODIUM CHLORIDE 9 MG/ML
INJECTION, SOLUTION INTRAVENOUS CONTINUOUS
Status: DISCONTINUED | OUTPATIENT
Start: 2020-06-01 | End: 2020-06-02 | Stop reason: HOSPADM

## 2020-06-01 RX ORDER — LIDOCAINE HYDROCHLORIDE 10 MG/ML
INJECTION, SOLUTION INFILTRATION; PERINEURAL PRN
Status: DISCONTINUED | OUTPATIENT
Start: 2020-06-01 | End: 2020-06-01

## 2020-06-01 RX ORDER — DULOXETIN HYDROCHLORIDE 60 MG/1
60 CAPSULE, DELAYED RELEASE ORAL DAILY
Status: DISCONTINUED | OUTPATIENT
Start: 2020-06-01 | End: 2020-06-02 | Stop reason: HOSPADM

## 2020-06-01 RX ORDER — MULTIPLE VITAMINS W/ MINERALS TAB 9MG-400MCG
1 TAB ORAL AT BEDTIME
Status: DISCONTINUED | OUTPATIENT
Start: 2020-06-01 | End: 2020-06-02 | Stop reason: HOSPADM

## 2020-06-01 RX ORDER — AMOXICILLIN 250 MG
2 CAPSULE ORAL 2 TIMES DAILY
Status: DISCONTINUED | OUTPATIENT
Start: 2020-06-01 | End: 2020-06-02 | Stop reason: HOSPADM

## 2020-06-01 RX ORDER — TRANEXAMIC ACID 10 MG/ML
1 INJECTION, SOLUTION INTRAVENOUS ONCE
Status: COMPLETED | OUTPATIENT
Start: 2020-06-01 | End: 2020-06-01

## 2020-06-01 RX ORDER — FENTANYL CITRATE 50 UG/ML
25-50 INJECTION, SOLUTION INTRAMUSCULAR; INTRAVENOUS
Status: DISCONTINUED | OUTPATIENT
Start: 2020-06-01 | End: 2020-06-01 | Stop reason: HOSPADM

## 2020-06-01 RX ORDER — OXYCODONE HYDROCHLORIDE 5 MG/1
5-10 TABLET ORAL
Status: DISCONTINUED | OUTPATIENT
Start: 2020-06-01 | End: 2020-06-02 | Stop reason: HOSPADM

## 2020-06-01 RX ORDER — CEFAZOLIN SODIUM 1 G/3ML
1 INJECTION, POWDER, FOR SOLUTION INTRAMUSCULAR; INTRAVENOUS SEE ADMIN INSTRUCTIONS
Status: DISCONTINUED | OUTPATIENT
Start: 2020-06-01 | End: 2020-06-01

## 2020-06-01 RX ORDER — GLYCINE 1.5 G/100ML
SOLUTION IRRIGATION PRN
Status: DISCONTINUED | OUTPATIENT
Start: 2020-06-01 | End: 2020-06-01 | Stop reason: HOSPADM

## 2020-06-01 RX ORDER — METAXALONE 800 MG/1
800 TABLET ORAL 3 TIMES DAILY PRN
Status: DISCONTINUED | OUTPATIENT
Start: 2020-06-01 | End: 2020-06-02 | Stop reason: HOSPADM

## 2020-06-01 RX ORDER — ONDANSETRON 2 MG/ML
4 INJECTION INTRAMUSCULAR; INTRAVENOUS EVERY 6 HOURS
Status: DISCONTINUED | OUTPATIENT
Start: 2020-06-01 | End: 2020-06-02

## 2020-06-01 RX ORDER — CEFAZOLIN SODIUM 2 G/100ML
2 INJECTION, SOLUTION INTRAVENOUS
Status: COMPLETED | OUTPATIENT
Start: 2020-06-01 | End: 2020-06-01

## 2020-06-01 RX ORDER — CELECOXIB 200 MG/1
200 CAPSULE ORAL 2 TIMES DAILY
Status: DISCONTINUED | OUTPATIENT
Start: 2020-06-02 | End: 2020-06-02 | Stop reason: HOSPADM

## 2020-06-01 RX ORDER — AMOXICILLIN 250 MG
1 CAPSULE ORAL 2 TIMES DAILY
Qty: 30 TABLET | Refills: 0 | Status: SHIPPED | OUTPATIENT
Start: 2020-06-01 | End: 2021-06-18

## 2020-06-01 RX ORDER — METOCLOPRAMIDE HYDROCHLORIDE 5 MG/ML
10 INJECTION INTRAMUSCULAR; INTRAVENOUS EVERY 6 HOURS PRN
Status: DISCONTINUED | OUTPATIENT
Start: 2020-06-01 | End: 2020-06-02 | Stop reason: HOSPADM

## 2020-06-01 RX ORDER — SODIUM CHLORIDE, SODIUM LACTATE, POTASSIUM CHLORIDE, CALCIUM CHLORIDE 600; 310; 30; 20 MG/100ML; MG/100ML; MG/100ML; MG/100ML
INJECTION, SOLUTION INTRAVENOUS CONTINUOUS PRN
Status: DISCONTINUED | OUTPATIENT
Start: 2020-06-01 | End: 2020-06-01

## 2020-06-01 RX ORDER — DIMENHYDRINATE 50 MG/ML
25 INJECTION, SOLUTION INTRAMUSCULAR; INTRAVENOUS
Status: DISCONTINUED | OUTPATIENT
Start: 2020-06-01 | End: 2020-06-01 | Stop reason: HOSPADM

## 2020-06-01 RX ORDER — ACETAMINOPHEN 325 MG/1
975 TABLET ORAL EVERY 8 HOURS
Status: DISCONTINUED | OUTPATIENT
Start: 2020-06-01 | End: 2020-06-02 | Stop reason: HOSPADM

## 2020-06-01 RX ORDER — ONDANSETRON 2 MG/ML
4 INJECTION INTRAMUSCULAR; INTRAVENOUS EVERY 6 HOURS PRN
Status: DISCONTINUED | OUTPATIENT
Start: 2020-06-01 | End: 2020-06-02 | Stop reason: HOSPADM

## 2020-06-01 RX ORDER — MEPERIDINE HYDROCHLORIDE 50 MG/ML
12.5 INJECTION INTRAMUSCULAR; INTRAVENOUS; SUBCUTANEOUS EVERY 5 MIN PRN
Status: DISCONTINUED | OUTPATIENT
Start: 2020-06-01 | End: 2020-06-01 | Stop reason: HOSPADM

## 2020-06-01 RX ORDER — LIDOCAINE 40 MG/G
CREAM TOPICAL
Status: DISCONTINUED | OUTPATIENT
Start: 2020-06-01 | End: 2020-06-02 | Stop reason: HOSPADM

## 2020-06-01 RX ORDER — DEXAMETHASONE SODIUM PHOSPHATE 4 MG/ML
INJECTION, SOLUTION INTRA-ARTICULAR; INTRALESIONAL; INTRAMUSCULAR; INTRAVENOUS; SOFT TISSUE PRN
Status: DISCONTINUED | OUTPATIENT
Start: 2020-06-01 | End: 2020-06-01

## 2020-06-01 RX ORDER — HYDROXYZINE HYDROCHLORIDE 25 MG/1
25 TABLET, FILM COATED ORAL EVERY 6 HOURS PRN
Status: DISCONTINUED | OUTPATIENT
Start: 2020-06-01 | End: 2020-06-02 | Stop reason: HOSPADM

## 2020-06-01 RX ORDER — SODIUM CHLORIDE, SODIUM LACTATE, POTASSIUM CHLORIDE, CALCIUM CHLORIDE 600; 310; 30; 20 MG/100ML; MG/100ML; MG/100ML; MG/100ML
INJECTION, SOLUTION INTRAVENOUS CONTINUOUS
Status: DISCONTINUED | OUTPATIENT
Start: 2020-06-01 | End: 2020-06-01 | Stop reason: HOSPADM

## 2020-06-01 RX ORDER — ONDANSETRON 2 MG/ML
4 INJECTION INTRAMUSCULAR; INTRAVENOUS EVERY 30 MIN PRN
Status: DISCONTINUED | OUTPATIENT
Start: 2020-06-01 | End: 2020-06-01 | Stop reason: HOSPADM

## 2020-06-01 RX ORDER — HYDROMORPHONE HYDROCHLORIDE 1 MG/ML
0.2 INJECTION, SOLUTION INTRAMUSCULAR; INTRAVENOUS; SUBCUTANEOUS
Status: DISCONTINUED | OUTPATIENT
Start: 2020-06-01 | End: 2020-06-02 | Stop reason: HOSPADM

## 2020-06-01 RX ORDER — POLYETHYLENE GLYCOL 3350 17 G/17G
17 POWDER, FOR SOLUTION ORAL DAILY
Status: DISCONTINUED | OUTPATIENT
Start: 2020-06-02 | End: 2020-06-02 | Stop reason: HOSPADM

## 2020-06-01 RX ADMIN — BUSPIRONE HYDROCHLORIDE 10 MG: 10 TABLET ORAL at 20:22

## 2020-06-01 RX ADMIN — LIDOCAINE HYDROCHLORIDE 50 MG: 10 INJECTION, SOLUTION INFILTRATION; PERINEURAL at 07:29

## 2020-06-01 RX ADMIN — Medication 4 MG: at 09:34

## 2020-06-01 RX ADMIN — HYDROMORPHONE HYDROCHLORIDE 1 MG: 1 INJECTION, SOLUTION INTRAMUSCULAR; INTRAVENOUS; SUBCUTANEOUS at 07:41

## 2020-06-01 RX ADMIN — OXYCODONE HYDROCHLORIDE 5 MG: 5 TABLET ORAL at 20:22

## 2020-06-01 RX ADMIN — FENTANYL CITRATE 50 MCG: 50 INJECTION, SOLUTION INTRAMUSCULAR; INTRAVENOUS at 08:08

## 2020-06-01 RX ADMIN — ROCURONIUM BROMIDE 10 MG: 10 INJECTION INTRAVENOUS at 08:26

## 2020-06-01 RX ADMIN — SENNOSIDES AND DOCUSATE SODIUM 2 TABLET: 8.6; 5 TABLET ORAL at 20:21

## 2020-06-01 RX ADMIN — SODIUM CHLORIDE, POTASSIUM CHLORIDE, SODIUM LACTATE AND CALCIUM CHLORIDE: 600; 310; 30; 20 INJECTION, SOLUTION INTRAVENOUS at 10:18

## 2020-06-01 RX ADMIN — GLYCOPYRROLATE 0.4 MG: 0.2 INJECTION, SOLUTION INTRAMUSCULAR; INTRAVENOUS at 09:34

## 2020-06-01 RX ADMIN — DEXAMETHASONE SODIUM PHOSPHATE 4 MG: 4 INJECTION, SOLUTION INTRA-ARTICULAR; INTRALESIONAL; INTRAMUSCULAR; INTRAVENOUS; SOFT TISSUE at 07:29

## 2020-06-01 RX ADMIN — SODIUM CHLORIDE: 9 INJECTION, SOLUTION INTRAVENOUS at 11:41

## 2020-06-01 RX ADMIN — SODIUM CHLORIDE, POTASSIUM CHLORIDE, SODIUM LACTATE AND CALCIUM CHLORIDE: 600; 310; 30; 20 INJECTION, SOLUTION INTRAVENOUS at 06:43

## 2020-06-01 RX ADMIN — PROPOFOL 170 MG: 10 INJECTION, EMULSION INTRAVENOUS at 07:29

## 2020-06-01 RX ADMIN — GLYCOPYRROLATE 0.2 MG: 0.2 INJECTION, SOLUTION INTRAMUSCULAR; INTRAVENOUS at 07:29

## 2020-06-01 RX ADMIN — PROPOFOL 50 MCG/KG/MIN: 10 INJECTION, EMULSION INTRAVENOUS at 07:47

## 2020-06-01 RX ADMIN — FENTANYL CITRATE 50 MCG: 50 INJECTION, SOLUTION INTRAMUSCULAR; INTRAVENOUS at 07:39

## 2020-06-01 RX ADMIN — MULTIPLE VITAMINS W/ MINERALS TAB 1 TABLET: TAB at 21:30

## 2020-06-01 RX ADMIN — HYDROMORPHONE HYDROCHLORIDE 0.5 MG: 1 INJECTION, SOLUTION INTRAMUSCULAR; INTRAVENOUS; SUBCUTANEOUS at 10:09

## 2020-06-01 RX ADMIN — HYDROMORPHONE HYDROCHLORIDE 0.5 MG: 1 INJECTION, SOLUTION INTRAMUSCULAR; INTRAVENOUS; SUBCUTANEOUS at 09:51

## 2020-06-01 RX ADMIN — FENTANYL CITRATE 50 MCG: 50 INJECTION INTRAMUSCULAR; INTRAVENOUS at 10:34

## 2020-06-01 RX ADMIN — SIMVASTATIN 40 MG: 40 TABLET, FILM COATED ORAL at 21:30

## 2020-06-01 RX ADMIN — ACETAMINOPHEN 975 MG: 325 TABLET, FILM COATED ORAL at 13:36

## 2020-06-01 RX ADMIN — HYDROMORPHONE HYDROCHLORIDE 0.2 MG: 1 INJECTION, SOLUTION INTRAMUSCULAR; INTRAVENOUS; SUBCUTANEOUS at 13:45

## 2020-06-01 RX ADMIN — TRANEXAMIC ACID 1 G: 10 INJECTION, SOLUTION INTRAVENOUS at 09:03

## 2020-06-01 RX ADMIN — MIDAZOLAM 2 MG: 1 INJECTION INTRAMUSCULAR; INTRAVENOUS at 07:20

## 2020-06-01 RX ADMIN — CEFAZOLIN SODIUM 1 G: 1 INJECTION, SOLUTION INTRAVENOUS at 23:47

## 2020-06-01 RX ADMIN — FENTANYL CITRATE 50 MCG: 50 INJECTION, SOLUTION INTRAMUSCULAR; INTRAVENOUS at 07:53

## 2020-06-01 RX ADMIN — CEFAZOLIN SODIUM 2 G: 2 INJECTION, SOLUTION INTRAVENOUS at 06:45

## 2020-06-01 RX ADMIN — SODIUM CHLORIDE, POTASSIUM CHLORIDE, SODIUM LACTATE AND CALCIUM CHLORIDE: 600; 310; 30; 20 INJECTION, SOLUTION INTRAVENOUS at 07:55

## 2020-06-01 RX ADMIN — FENTANYL CITRATE 100 MCG: 50 INJECTION, SOLUTION INTRAMUSCULAR; INTRAVENOUS at 07:29

## 2020-06-01 RX ADMIN — ACETAMINOPHEN 975 MG: 325 TABLET, FILM COATED ORAL at 21:25

## 2020-06-01 RX ADMIN — CEFAZOLIN SODIUM 1 G: 1 INJECTION, SOLUTION INTRAVENOUS at 15:34

## 2020-06-01 RX ADMIN — LANSOPRAZOLE 30 MG: 15 CAPSULE, DELAYED RELEASE ORAL at 21:26

## 2020-06-01 RX ADMIN — AMLODIPINE BESYLATE 5 MG: 5 TABLET ORAL at 15:34

## 2020-06-01 RX ADMIN — ROCURONIUM BROMIDE 40 MG: 10 INJECTION INTRAVENOUS at 07:29

## 2020-06-01 RX ADMIN — ONDANSETRON 4 MG: 2 INJECTION INTRAMUSCULAR; INTRAVENOUS at 20:22

## 2020-06-01 RX ADMIN — KETOROLAC TROMETHAMINE 30 MG: 30 INJECTION, SOLUTION INTRAMUSCULAR at 09:21

## 2020-06-01 RX ADMIN — OXYCODONE HYDROCHLORIDE 5 MG: 5 TABLET ORAL at 15:34

## 2020-06-01 RX ADMIN — OYSTER SHELL CALCIUM WITH VITAMIN D 2 TABLET: 500; 200 TABLET, FILM COATED ORAL at 21:30

## 2020-06-01 RX ADMIN — TRANEXAMIC ACID 1 G: 10 INJECTION, SOLUTION INTRAVENOUS at 07:25

## 2020-06-01 RX ADMIN — SODIUM CHLORIDE 1000 ML: 9 INJECTION, SOLUTION INTRAVENOUS at 11:41

## 2020-06-01 RX ADMIN — DULOXETINE HYDROCHLORIDE 60 MG: 60 CAPSULE, DELAYED RELEASE ORAL at 15:34

## 2020-06-01 RX ADMIN — CELECOXIB 400 MG: 200 CAPSULE ORAL at 06:22

## 2020-06-01 RX ADMIN — CEFAZOLIN SODIUM 1 G: 2 INJECTION, SOLUTION INTRAVENOUS at 08:45

## 2020-06-01 RX ADMIN — ONDANSETRON 4 MG: 2 INJECTION INTRAMUSCULAR; INTRAVENOUS at 13:36

## 2020-06-01 RX ADMIN — ONDANSETRON HYDROCHLORIDE 4 MG: 2 INJECTION, SOLUTION INTRAVENOUS at 08:27

## 2020-06-01 RX ADMIN — PANTOPRAZOLE SODIUM 40 MG: 40 TABLET, DELAYED RELEASE ORAL at 06:22

## 2020-06-01 RX ADMIN — ALPRAZOLAM 0.25 MG: 0.25 TABLET ORAL at 21:52

## 2020-06-01 RX ADMIN — LISINOPRIL 40 MG: 40 TABLET ORAL at 15:34

## 2020-06-01 ASSESSMENT — ACTIVITIES OF DAILY LIVING (ADL)
ADLS_ACUITY_SCORE: 14
ADLS_ACUITY_SCORE: 11
ADLS_ACUITY_SCORE: 14

## 2020-06-01 ASSESSMENT — MIFFLIN-ST. JEOR: SCORE: 1267.61

## 2020-06-01 NOTE — ANESTHESIA PREPROCEDURE EVALUATION
Anesthesia Pre-Procedure Evaluation    Patient: Mariah Mart   MRN: [id number] : 1960          Preoperative Diagnosis: DJD (degenerative joint disease) [M19.90]    Procedure(s):  Left total hip arthroplasty    Past Medical History:   Diagnosis Date     Essential hypertension, benign      Hypopotassemia     on diuretics     Internal hemorrhoids     ligated     Intractable headache, unspecified chronicity pattern, unspecified headache type 2017     Mixed hyperlipidemia      Obesity 2010     Past Surgical History:   Procedure Laterality Date     LAPAROSCOPIC APPENDECTOMY N/A 1/10/2018    Procedure: LAPAROSCOPIC APPENDECTOMY;  LAPAROSCOPIC APPENDECTOMY ;  Surgeon: Eugenia De La Rosa MD;  Location: [facility] OR     ORTHOPEDIC SURGERY Left     Closed reduction of elbow     TONSILLECTOMY & ADENOIDECTOMY       Anesthesia Evaluation     . Pt has had prior anesthetic. Type: General           ROS/MED HX    ENT/Pulmonary:  - neg pulmonary ROS     Neurologic:  - neg neurologic ROS     Cardiovascular:     (+) Dyslipidemia, hypertension----. : . . . :. .       METS/Exercise Tolerance:     Hematologic:  - neg hematologic  ROS       Musculoskeletal:   (+) arthritis,  other musculoskeletal- DDD hip      GI/Hepatic:     (+) GERD Asymptomatic on medication,       Renal/Genitourinary:  - ROS Renal section negative       Endo:     (+) Obesity, .      Psychiatric:  - neg psychiatric ROS       Infectious Disease:  - neg infectious disease ROS       Malignancy:      - no malignancy   Other:    (+) No chance of pregnancy C-spine cleared: N/A, no H/O Chronic Pain,no other significant disability                         Physical Exam  Normal systems: cardiovascular, pulmonary and dental    Airway   Mallampati: II  TM distance: >3 FB  Neck ROM: full    Dental     Cardiovascular       Pulmonary             Lab Results   Component Value Date    WBC 5.7 2020    HGB 13.0 2020    HCT 44.9 2020      "04/16/2020     05/13/2020    POTASSIUM 3.9 05/13/2020    CHLORIDE 105 05/13/2020    CO2 28 05/13/2020    BUN 8 05/13/2020    CR 0.54 05/13/2020    GLC 91 05/13/2020    CECILIA 8.7 05/13/2020    MAG 2.2 04/16/2020    ALBUMIN 4.3 04/16/2020    PROTTOTAL 7.6 04/16/2020    ALT 29 04/16/2020    AST 18 04/16/2020    ALKPHOS 92 04/16/2020    BILITOTAL 0.3 04/16/2020    TSH 1.55 08/25/2004    HCG Negative 06/22/2006    HCGS Negative 01/10/2018       Preop Vitals  BP Readings from Last 3 Encounters:   06/01/20 (!) 155/87   05/13/20 129/78   05/01/20 129/75    Pulse Readings from Last 3 Encounters:   05/13/20 93   05/01/20 80   04/16/20 79      Resp Readings from Last 3 Encounters:   06/01/20 16   05/01/20 18   04/16/20 18    SpO2 Readings from Last 3 Encounters:   06/01/20 98%   05/13/20 100%   05/01/20 98%      Temp Readings from Last 1 Encounters:   06/01/20 97.7  F (36.5  C) (Temporal)    Ht Readings from Last 1 Encounters:   06/01/20 1.6 m (5' 3\")      Wt Readings from Last 1 Encounters:   06/01/20 72.8 kg (160 lb 9.6 oz)    Estimated body mass index is 28.45 kg/m  as calculated from the following:    Height as of this encounter: 1.6 m (5' 3\").    Weight as of this encounter: 72.8 kg (160 lb 9.6 oz).       Anesthesia Plan      History & Physical Review  History and physical reviewed and following examination; no interval change.    ASA Status:  2 .        Plan for General with Propofol and Intravenous induction. Maintenance will be Balanced.    PONV prophylaxis:  Ondansetron (or other 5HT-3) and Dexamethasone or Solumedrol  Pt refuses a spinal. Requests GA.        Postoperative Care  Postoperative pain management:  IV analgesics.      Consents  Anesthetic plan, risks, benefits and alternatives discussed with:  Patient.  Use of blood products discussed: Yes.   Use of blood products discussed with Patient.  Consented to blood products.  .                 Jimbo Blank MD                    .  "

## 2020-06-01 NOTE — ANESTHESIA CARE TRANSFER NOTE
Patient: Mariah Mart    Procedure(s):  Left total hip arthroplasty    Diagnosis: DJD (degenerative joint disease) [M19.90]  Diagnosis Additional Information: No value filed.    Anesthesia Type:   General     Note:  Airway :Face Mask  Patient transferred to:PACU  Comments: Pt to PACU, VSS, report to RNHandoff Report: Identifed the Patient, Identified the Reponsible Provider, Reviewed the pertinent medical history, Discussed the surgical course, Reviewed Intra-OP anesthesia mangement and issues during anesthesia, Set expectations for post-procedure period and Allowed opportunity for questions and acknowledgement of understanding      Vitals: (Last set prior to Anesthesia Care Transfer)    CRNA VITALS  6/1/2020 0912 - 6/1/2020 0950      6/1/2020             Resp Rate (observed):  (!) 5                Electronically Signed By: LAUREN Vitale CRNA  June 1, 2020  9:50 AM

## 2020-06-01 NOTE — ANESTHESIA POSTPROCEDURE EVALUATION
Patient: Mariah Mart    Procedure(s):  Left total hip arthroplasty    Diagnosis:DJD (degenerative joint disease) [M19.90]  Diagnosis Additional Information: No value filed.    Anesthesia Type:  General    Note:  Anesthesia Post Evaluation    Patient location during evaluation: PACU  Patient participation: Able to fully participate in evaluation  Level of consciousness: awake and alert  Pain management: adequate  Airway patency: patent  Cardiovascular status: acceptable  Respiratory status: acceptable  Hydration status: acceptable  PONV: controlled     Anesthetic complications: None          Last vitals:  Vitals:    06/01/20 1435 06/01/20 1522 06/01/20 1532   BP: 109/50 127/63    Pulse: 94     Resp: 16 12 14   Temp:  99  F (37.2  C)    SpO2: 95% 98% 96%         Electronically Signed By: Jimbo Blank MD  June 1, 2020  3:35 PM

## 2020-06-01 NOTE — OP NOTE
Procedure Date: 06/01/2020      PREOPERATIVE DIAGNOSIS:  Left hip primary osteoarthritis.      POSTOPERATIVE DIAGNOSIS:  Left hip primary osteoarthritis.      PROCEDURE:  Left total hip arthroplasty using a Biomet G7 acetabulum and Taperloc femoral stem.      SURGEON:  Lorenzo Hunt MD      FIRST ASSISTANT:  Maricruz Gamboa PA-C      INDICATIONS:  Ms. Mart is a very pleasant 59-year-old female who has had ongoing left hip pain for some time now, which has been excruciating now to the point where she is requiring narcotic medication, ambulates at times with gait aids and it is really impacting her daily life.  She has failed conservative management and now wishes to proceed with operative intervention.  We had a long discussion of risks, benefits and alternatives of total hip arthroplasty and wishes to proceed with surgery.      NARRATIVE EVENTS:  After thorough evaluation and proper identification of patient's extremity to be operated on, Ms. Mart was taken to the operating room where she underwent a general anesthetic.  She was placed in the right lateral decubitus position with the left hip up and her left hip was prepped and draped in the usual sterile manner.  After appropriate surgical pause to confirm the patient's extremity to be operated on, the patient received 2 grams of Ancef.  Her left hip was approached through a lateral incision centered over the greater trochanter.  Skin and soft tissue were sharply dissected down to the tensor fascia.  The fascia was then split in line with the fibers in line with the femur and taken down to the trochanteric bursa.  The bursa tissue was removed.  The anterior one-third of the gluteus medius was removed off the greater trochanter with modified Hardinge fashion.  This took us down onto the joint capsule, which was T'd and the femoral head and neck were surgically dislocated from the acetabulum.  At this point, we then made our femoral neck osteotomy 12 mm  proximal to the lesser trochanter in accordance with our preoperative templating.  We removed the femoral head from the field and exposed the acetabulum, removed ligament of teres, transverse acetabular ligament and labrum, sequentially reamed the acetabulum up to fit a size 50 Biomet G7 acetabular component.  We impacted this into position approximately 20 degrees of anteversion, 40 degrees of abduction.  We had good primary stability of the cup after impaction, but we augmented this with 2 screws, 1 in the inner table of the pelvis, 1 in the posterior column.  Once this was done, we removed the excess osteophytes from around the acetabulum and then placed our flat polyethylene liner to fit a size 36 mm femoral head in the acetabulum.  At this point, we then paid our attention to the femur.  Here we removed the bone from the piriformis fossa, used the entry reamer, lateralized and sequentially broached up to fit a size 8 Biomet Taperloc femoral stem with a standard neck offset and a -6 neck length on a 36 mm femoral head.  This gave us good stability and full range of motion and appropriate leg length and soft tissue tension when we reduced the hip.  At this point, we then prepared to place our final implants into position, a size   8 Biomet Taperloc femoral stem with a standard neck offset.  Once this was solidly into position,  we then retrialed our femoral heads and found that the -6 neck length and a 36 mm femoral head gave us the best stability and full range of motion, appropriate leg length and soft tissue tension.  So a cobalt chrome low friction metal ion femoral head was impacted on the trunnion.  The hip was reduced and the patient tolerated the procedure without difficulty.  We thoroughly irrigated the wound with saline and IrriSept solution.  We closed the joint capsule with interrupted 0 Vicryl sutures.  We closed the abductor with #5 Ethibond sutures through bone tunnels, closed the tensor fascia  with interrupted and running 0 Vicryl sutures and a running #1 Stratafix suture.  We closed the skin and soft tissue with absorbable sutures and staples in the skin.  The patient was placed in a well-padded postoperative dressing and taken to the recovery room in stable condition.  She tolerated the procedure without difficulty.         ROBERT BAI MD             D: 2020   T: 2020   MT: GH      Name:     EDUARDO MIDDLETON   MRN:      [id number]        Account:        [id number]   :      1960           Procedure Date: 2020      Document: [id number]

## 2020-06-01 NOTE — PLAN OF CARE
Pt arrived on floor at 1115 from PACU. Alert and oriented. Tolerating regular diet. Transfers with Ax1, gait belt, and walker. Dressing CDI, CMS intact. Voiding in adequate amounts. Pain managed with scheduled Tylenol, prn IV Dilaudid x1, and Oxycodone. Plans d/c to home tomorrow.

## 2020-06-01 NOTE — PROGRESS NOTES
06/01/20 1608   Quick Adds   Type of Visit Initial PT Evaluation   Living Environment   Living Environment Comment Pt lives in a house with significant other and children. Pt has nurses that come in to assist helping with children. Sister will also be around to help out as needed. 14 steps to bedroom and bathroom, unilateral railing on R. Reports ind at baseline, used cane when pain flared up.    Self-Care   Usual Activity Tolerance excellent   Current Activity Tolerance moderate   Equipment Currently Used at Home walker, rolling;cane, straight   Functional Level Prior   Ambulation 0-->independent   Transferring 0-->independent   Toileting 0-->independent   Bathing 0-->independent   Fall history within last six months no   General Information   Onset of Illness/Injury or Date of Surgery - Date 06/01/20   Referring Physician Lorenzo Hunt MD    Patient/Family Goals Statement To go home   Pertinent History of Current Problem (include personal factors and/or comorbidities that impact the POC) Pt is a 59 year old woman POD0 L ANGELICA.   Precautions/Limitations no known precautions/limitations   Weight-Bearing Status - LLE weight-bearing as tolerated   Cognitive Status Examination   Orientation orientation to person, place and time   Level of Consciousness alert   Follows Commands and Answers Questions able to follow multistep instructions;100% of the time   Personal Safety and Judgment intact   Memory intact   Pain Assessment   Patient Currently in Pain Yes, see Vital Sign flowsheet   Range of Motion (ROM)   ROM Comment R LE WFL, L LE decreased   Strength   Strength Comments R grossly 4/5;  L LE   Bed Mobility   Bed Mobility Comments Marvin supine to sit   Transfer Skills   Transfer Comments CGA sit to stand   Gait   Gait Comments CGA with FWW   Balance   Balance Comments Good unsupported sitting, good standing with FWW   Sensory Examination   Sensory Perception Comments no c/o N/B/T   General Therapy  "Interventions   Planned Therapy Interventions balance training;bed mobility training;gait training;ROM;strengthening;transfer training;home program guidelines;progressive activity/exercise   Clinical Impression   Criteria for Skilled Therapeutic Intervention yes, treatment indicated   PT Diagnosis impaired functional mobility   Influenced by the following impairments decreased L LE ROM/strength s/p ANGELICA   Functional limitations due to impairments impaired bed mobility, transfers, ambulation, stairs   Clinical Presentation Stable/Uncomplicated   Clinical Presentation Rationale pt is medically stable   Clinical Decision Making (Complexity) Low complexity   Therapy Frequency Daily   Predicted Duration of Therapy Intervention (days/wks) 2 days   Anticipated Equipment Needs at Discharge walker   Anticipated Discharge Disposition Home with Assist   Risk & Benefits of therapy have been explained Yes   Patient, Family & other staff in agreement with plan of care Yes   [facility] TM \"6 Clicks\"   2016, Trustees of [facility], under license to [employer].  All rights reserved.   6 Clicks Short Forms Basic Mobility Inpatient Short Form   [facility]  \"6 Clicks\" V.2 Basic Mobility Inpatient Short Form   1. Turning from your back to your side while in a flat bed without using bedrails? 3 - A Little   2. Moving from lying on your back to sitting on the side of a flat bed without using bedrails? 3 - A Little   3. Moving to and from a bed to a chair (including a wheelchair)? 3 - A Little   4. Standing up from a chair using your arms (e.g., wheelchair, or bedside chair)? 3 - A Little   5. To walk in hospital room? 3 - A Little   6. Climbing 3-5 steps with a railing? 3 - A Little   Basic Mobility Raw Score (Score out of 24.Lower scores equate to lower levels of function) 18   Total Evaluation Time   Total Evaluation Time (Minutes) 9     "

## 2020-06-01 NOTE — CONSULTS
[facility]  Hospitalist Consult Note  Name: Mariah Mart    MRN: [id number]  YOB: 1960    Age: 59 year old  Date of admission: 6/1/2020  Primary care provider: Jennifer Dickerson     Requesting Physician:  Dr. Hunt  Reason for consult: Management of chronic medical comorbidities    Mariah Mart is a 59 year old female with past medical history including hypertension, anxiety, acid reflux, osteoarthritis who presents with left total hip arthroplasty by Dr. Hunt 6/1.  I was asked to consult to help with management of her chronic medical conditions.  Postoperatively she feels she is doing well overall.         Assessment and Plan:   1.  Status post left total hip arthroplasty on 6/1/2020: PT/OT ordered.  Pain control, DVT prophylaxis, diet and activity as per primary orthopedic service.      2.  Hx of HTN: Agree with resuming home amlodipine and lisinopril but I will add hold parameters to these.    3.  History of anxiety: Resume BuSpar and Cymbalta.  Some increase in anxiety symptoms related to recent pandemic and social interest.    4.  History of acid reflux: Resume home Prevacid             History of Present Illness:   Mariah Mart is a 59 year old female with past medical history including hypertension, anxiety, acid reflux, osteoarthritis who presents with left total hip arthroplasty by Dr. Hunt 6/1.  I was asked to consult to help with management of her chronic medical conditions.  Postoperatively she denies any chest pain, shortness of breath, dizziness or nausea and vomiting.  She feels well overall and is about to work with physical therapy.  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  She does have visiting nurses that help her care for these children at her home.  Per operative notes estimated blood loss was less than 50  noreen.                  Past Medical History:     Past Medical History:   Diagnosis Date     Essential hypertension, benign      Hypopotassemia     on diuretics     Internal hemorrhoids     ligated     Intractable headache, unspecified chronicity pattern, unspecified headache type 2017     Mixed hyperlipidemia      Obesity 2010             Past Surgical History:     Past Surgical History:   Procedure Laterality Date     LAPAROSCOPIC APPENDECTOMY N/A 1/10/2018    Procedure: LAPAROSCOPIC APPENDECTOMY;  LAPAROSCOPIC APPENDECTOMY ;  Surgeon: Eugenia De La Rosa MD;  Location: [facility] OR     ORTHOPEDIC SURGERY Left     Closed reduction of elbow     TONSILLECTOMY & ADENOIDECTOMY                 Social History:     Social History     Tobacco Use     Smoking status: Never Smoker     Smokeless tobacco: Never Used   Substance Use Topics     Alcohol use: Yes     Comment: 1 mixed drink per month             Family History:     Family History   Problem Relation Age of Onset     Hypertension Mother      Heart Disease Mother         MI  at 52     Heart Disease Father         : CABG     Respiratory Father         COPD     Hypertension Father      Hypertension Brother      Genitourinary Problems Sister         interstital cystitis     Breast Cancer No family hx of      Cancer - colorectal No family hx of      Diabetes No family hx of              Allergies:     Allergies   Allergen Reactions     Gabapentin Other (See Comments)     Pt feels out of it             Medications:       acetaminophen  975 mg Oral Q8H     amLODIPine  5 mg Oral Daily     busPIRone  10 mg Oral BID     calcium carbonate-vitamin D  2 tablet Oral At Bedtime     ceFAZolin  1 g Intravenous Q8H     [START ON 2020] celecoxib  200 mg Oral BID     DULoxetine  60 mg Oral Daily     [START ON 2020] enoxaparin ANTICOAGULANT  40 mg Subcutaneous Q24H     LANsoprazole  30 mg Oral At Bedtime     lisinopril  40 mg Oral Daily     multivitamin  "w/minerals  1 tablet Oral At Bedtime     ondansetron  4 mg Intravenous Q6H     [START ON 6/2/2020] polyethylene glycol  17 g Oral Daily     senna-docusate  2 tablet Oral BID     simvastatin  40 mg Oral At Bedtime     sodium chloride (PF)  3 mL Intracatheter Q8H     tranexamic acid  1 g Intravenous Once             Review of Systems:   A comprehensive greater than 10 system review of systems was carried out.  Pertinent positives and negatives are noted above.  Otherwise negative for contributory info.            Physical Exam:   Blood pressure 127/63, pulse 94, temperature 99  F (37.2  C), temperature source Temporal, resp. rate 14, height 1.6 m (5' 3\"), weight 72.8 kg (160 lb 9.6 oz), last menstrual period 05/30/2007, SpO2 96 %.  Exam:  General: Alert, awake, no acute distress.  HEENT: NC/AT, eyes anicteric, external occular movements intact, face symmetric.  Dentition WNL, MM moist.  Cardiac: RRR, S1, S2.  No murmurs appreciated.  Pulmonary: Normal chest rise, normal work of breathing.  Lungs CTA BL  Abdomen: soft, non-tender, non-distended.  Bowel Sounds Present.  No guarding.  Extremities: no deformities.  Warm, well perfused.  Skin: no rashes or lesions noted.  Warm and Dry.  Neuro: No focal deficits noted.  Speech clear.  Coordination and strength grossly normal.  Psych: Appropriate affect.           Data:     Lab Results   Component Value Date    WBC 5.7 04/16/2020     Lab Results   Component Value Date    RBC 4.74 04/16/2020     Lab Results   Component Value Date    HGB 13.0 05/13/2020     Lab Results   Component Value Date    HCT 44.9 04/16/2020     Lab Results   Component Value Date    MCV 95 04/16/2020     Lab Results   Component Value Date    MCH 30.8 04/16/2020     Lab Results   Component Value Date    MCHC 32.5 04/16/2020     Lab Results   Component Value Date    RDW 13.1 04/16/2020     Lab Results   Component Value Date     06/01/2020       Imaging:    Recent Results (from the past 24 hour(s)) "   XR Hip Port Left G/E 2 Views    Narrative    This exam was marked as non-reportable because it will not be read by a   radiologist or a [place] non-radiologist provider.         XR Pelvis w Hip Left 1 View    Narrative    PELVIS AND HIP LEFT ONE VIEW   6/1/2020 11:11 AM     HISTORY: Postoperative.    COMPARISON: Intraoperative film from earlier today.      Impression    IMPRESSION: Left total hip arthroplasty in place. No evidence of  complication.

## 2020-06-01 NOTE — PLAN OF CARE
PT: order received and evaluation complete. Pt is a 59 year old woman POD0 L ANGELICA, history of HTN and anxiety. Pt lives in a house with significant other and fostered/adopted children with rare genetic diseases. Pt has nurses that come in to assist helping with children. Sister will also be around for near future to help out as needed. Ramp to enter house, 14 steps to pt bedroom and bathroom w/ unilateral railing on R. Reports ind at baseline, used cane recently when pain flared up.     Patient plan: home with assist  Current status: Pt supine, willing to participate in PT. Educated pt in WBAT status L LE. Pt required Marvin with supine to sit transfer to R at L LE. Pt with good unsupported sitting balance. Sit <> stand with CGA and FWW, cuing for hand placement. Pt perform toilet transfer with good eccentric control and use of handrail. Assisted pt in donning pants EOB. Pt required Marvin sit to supine transfer.  Instructed patient to ambulate with  feet CGA/SBA. Pt demonstrated step to patterning initially, cuing for equal step length bilaterally. Pt able to modify with upright posturing. Instructed pt in navigation of 2x4 stairs with CGA. First trial with bilateral railings, second with R rail and SEC in L UE. Pt performed with step to patterning. No LOB or instability throughout ambulation. Sp02 measured 98% following.   Anticipated status at discharge: Marvin with bed mobility, SBA with transfers ambulation and stairs w/FWW

## 2020-06-01 NOTE — BRIEF OP NOTE
[facility]    Brief Operative Note    Pre-operative diagnosis: DJD (degenerative joint disease) [M19.90]  Post-operative diagnosis Same as pre-operative diagnosis    Procedure: Procedure(s):  Left total hip arthroplasty  Surgeon: Surgeon(s) and Role:     * Lorenzo Hunt MD - Primary     * Maricruz Gamoba PA-C - Assisting  Anesthesia: General   Estimated blood loss: Less than 50 ml  Drains: None  Specimens: * No specimens in log *  Findings:   None.  Complications: None.  Implants:   Implant Name Type Inv. Item Serial No.  Lot No. LRB No. Used Action   IMP LINER BIOM G7 ACET LUIS ARCOMXL CRSLNK 36MM SZ D [id number] Total Joint Component/Insert IMP LINER BIOM G7 ACET LUIS ARCOMXL CRSLNK 36MM SZ D [id number]  [employer] [id number] Left 1 Implanted   IMP SHELL BIOM G7 ACETAB PPS CALDERA HOLE 50MM SZ D [id number] Total Joint Component/Insert IMP SHELL BIOM G7 ACETAB PPS CALDERA HOLE 50MM SZ D [id number]  [employer] [id number] Left 1 Implanted   IMP SCR ZIM 6.5X20MM ACET CUP SELF TAP -896-20 Metallic Hardware/[place] IMP SCR ZIM 6.5X20MM ACET CUP SELF TAP -544-20  [employer] [id number] Left 1 Implanted   IMP SCR ZIM 6.5X20MM ACET CUP SELF TAP -892-20 Metallic Hardware/[place] IMP SCR ZIM 6.5X20MM ACET CUP SELF TAP -792-20  [employer] [id number] Left 1 Implanted   Taperloc Complete Primary Femoral Porous Coated Stem [id number] Standard Offset Type 1 Taper    BIOMET [id number] Left 1 Implanted   Biolox Delta Modular Ceramic Head 36mm, Type 1 Taper, -3mm Neck    BIOMET [id number] Left 1 Wasted   Biomet Hip System Modular Head Component, 36mm, Type 1 Taper, -6mm Neck    BIOMET [id number] Left 1 Implanted

## 2020-06-02 ENCOUNTER — APPOINTMENT (OUTPATIENT)
Dept: PHYSICAL THERAPY | Facility: CLINIC | Age: 60
End: 2020-06-02
Attending: ORTHOPAEDIC SURGERY
Payer: COMMERCIAL

## 2020-06-02 VITALS
SYSTOLIC BLOOD PRESSURE: 135 MMHG | OXYGEN SATURATION: 98 % | RESPIRATION RATE: 16 BRPM | HEIGHT: 63 IN | WEIGHT: 160.6 LBS | HEART RATE: 93 BPM | TEMPERATURE: 98.6 F | DIASTOLIC BLOOD PRESSURE: 60 MMHG | BODY MASS INDEX: 28.46 KG/M2

## 2020-06-02 LAB
GLUCOSE SERPL-MCNC: 107 MG/DL (ref 70–99)
HGB BLD-MCNC: 9.8 G/DL (ref 11.7–15.7)

## 2020-06-02 PROCEDURE — 99212 OFFICE O/P EST SF 10 MIN: CPT | Performed by: INTERNAL MEDICINE

## 2020-06-02 PROCEDURE — 85018 HEMOGLOBIN: CPT | Performed by: ORTHOPAEDIC SURGERY

## 2020-06-02 PROCEDURE — 97110 THERAPEUTIC EXERCISES: CPT | Mod: GP

## 2020-06-02 PROCEDURE — 99207 ZZC CDG-CODE CATEGORY CHANGED: CPT | Performed by: INTERNAL MEDICINE

## 2020-06-02 PROCEDURE — 25000132 ZZH RX MED GY IP 250 OP 250 PS 637: Performed by: INTERNAL MEDICINE

## 2020-06-02 PROCEDURE — 25000128 H RX IP 250 OP 636: Performed by: ORTHOPAEDIC SURGERY

## 2020-06-02 PROCEDURE — 40000894 ZZH STATISTIC OT IP EVAL DEFER: Performed by: REHABILITATION PRACTITIONER

## 2020-06-02 PROCEDURE — 36415 COLL VENOUS BLD VENIPUNCTURE: CPT | Performed by: ORTHOPAEDIC SURGERY

## 2020-06-02 PROCEDURE — 97116 GAIT TRAINING THERAPY: CPT | Mod: GP

## 2020-06-02 PROCEDURE — 82947 ASSAY GLUCOSE BLOOD QUANT: CPT | Performed by: ORTHOPAEDIC SURGERY

## 2020-06-02 PROCEDURE — 25000132 ZZH RX MED GY IP 250 OP 250 PS 637: Performed by: ORTHOPAEDIC SURGERY

## 2020-06-02 RX ADMIN — ONDANSETRON 4 MG: 2 INJECTION INTRAMUSCULAR; INTRAVENOUS at 03:34

## 2020-06-02 RX ADMIN — SENNOSIDES AND DOCUSATE SODIUM 2 TABLET: 8.6; 5 TABLET ORAL at 08:46

## 2020-06-02 RX ADMIN — OXYCODONE HYDROCHLORIDE 5 MG: 5 TABLET ORAL at 06:57

## 2020-06-02 RX ADMIN — BUSPIRONE HYDROCHLORIDE 10 MG: 10 TABLET ORAL at 08:45

## 2020-06-02 RX ADMIN — ACETAMINOPHEN 975 MG: 325 TABLET, FILM COATED ORAL at 06:57

## 2020-06-02 RX ADMIN — ENOXAPARIN SODIUM 40 MG: 40 INJECTION SUBCUTANEOUS at 08:43

## 2020-06-02 RX ADMIN — CELECOXIB 200 MG: 200 CAPSULE ORAL at 08:45

## 2020-06-02 RX ADMIN — OXYCODONE HYDROCHLORIDE 5 MG: 5 TABLET ORAL at 10:07

## 2020-06-02 RX ADMIN — DULOXETINE HYDROCHLORIDE 60 MG: 60 CAPSULE, DELAYED RELEASE ORAL at 08:46

## 2020-06-02 RX ADMIN — AMLODIPINE BESYLATE 5 MG: 5 TABLET ORAL at 08:45

## 2020-06-02 RX ADMIN — OXYCODONE HYDROCHLORIDE 5 MG: 5 TABLET ORAL at 03:33

## 2020-06-02 RX ADMIN — LISINOPRIL 40 MG: 40 TABLET ORAL at 08:46

## 2020-06-02 ASSESSMENT — ACTIVITIES OF DAILY LIVING (ADL)
ADLS_ACUITY_SCORE: 11
ADLS_ACUITY_SCORE: 11

## 2020-06-02 NOTE — PLAN OF CARE
Patient plan: home, rider waiting upon OT arrival  Current status: Patient reported to OT that therapy session was not needed, she has enough A at home and feels she can manage all personal cares like she was prior to surgery. Attempted to further explain OT POC and purpose, patient declined, reported she wanted to leave and asked for someone bring her a wc and down to front door. OT offered to get her nurse and RN was updated.  Anticipated status at discharge: patient declined OT services, unable to assess status, defer to PT.

## 2020-06-02 NOTE — PLAN OF CARE
Reviewed discharge instructions and medications with patient and sister. Questions answered. Pt refused occupational therapy. Patient discharged to home with sister as transport, discharge instructions, medications (Oxycodone, Senna, and Aspirin), and belongings at this time.

## 2020-06-02 NOTE — PLAN OF CARE
Alert and oriented X4. VSS. Pain managed with oral medications. Assist of 1 with a walker and gait belt. Voiding adequately.

## 2020-06-02 NOTE — PROGRESS NOTES
"[facility]  Hospitalist consult progress Note  Anders Parker MD 06/02/20    Reason for Stay (Diagnosis): Left total hip arthroplasty         Assessment and Plan:      Summary of Stay: Mariah Mart is a 59 year old female with past medical history including hypertension, anxiety, acid reflux, osteoarthritis who presents with left total hip arthroplasty by Dr. Hunt 6/1.  I was asked to consult to help with management of her chronic medical conditions.  Postoperatively she feels she is doing well overall and plans to discharge home today.  No major changes made to her home medications I note with a new prescription for oxycodone I have discontinued her previous prescription for Norco to avoid duplicate medications.           1.  Status post left total hip arthroplasty on 6/1/2020: PT/OT ordered.  Pain control, DVT prophylaxis, diet and activity as per primary orthopedic service.       2.  Hx of HTN: Agree with resuming home amlodipine and lisinopril, seems to be tolerating so far, is normotensive this morning.     3.  History of anxiety: Resumed BuSpar and Cymbalta.  Some increase in anxiety symptoms related to recent pandemic and social interest.     4.  History of acid reflux: Resume home Prevacid                   Interval History (Subjective):      No acute events overnight, patient plans to discharge home this morning  I did discontinue previous prescription for Norco in the med rec as I note she has a new prescription for oxycodone to avoid duplication.                    Physical Exam:      Last Vital Signs:  /60 (BP Location: Right arm)   Pulse 93   Temp 98.6  F (37  C) (Temporal)   Resp 16   Ht 1.6 m (5' 3\")   Wt 72.8 kg (160 lb 9.6 oz)   LMP 05/30/2007   SpO2 98%   BMI 28.45 kg/m        Intake/Output Summary (Last 24 hours) at 6/2/2020 0851  Last data filed at 6/2/2020 0840  Gross per 24 hour   Intake 4593.33 ml   Output 1675 ml   Net 2918.33 ml       General: " Alert, awake, no acute distress.  HEENT: NC/AT, eyes anicteric, external occular movements intact, face symmetric.  Dentition WNL, MM moist.  Cardiac: RRR, S1, S2.  No murmurs appreciated.  Pulmonary: Normal chest rise, normal work of breathing.  Lungs CTA BL  Abdomen: soft, non-tender, non-distended.  Bowel Sounds Present.  No guarding.  Extremities: no deformities.  Warm, well perfused.  Skin: no rashes or lesions noted.  Warm and Dry.  Neuro: No focal deficits noted.  Speech clear.  Coordination and strength grossly normal.  Psych: Appropriate affect.         Medications:      All current medications were reviewed with changes reflected in problem list.         Data:      All new lab and imaging data was reviewed.   Labs:  Recent Labs   Lab 06/02/20 0629 06/01/20  1242   *  --    CR  --  0.54   GFRESTIMATED  --  >90   GFRESTBLACK  --  >90     Recent Labs   Lab 06/02/20 0629 06/01/20  1242   HGB 9.8*  --    PLT  --  339      Imaging:   Recent Results (from the past 48 hour(s))   XR Hip Port Left G/E 2 Views    Narrative    This exam was marked as non-reportable because it will not be read by a   radiologist or a [place] non-radiologist provider.         XR Pelvis w Hip Left 1 View    Narrative    PELVIS AND HIP LEFT ONE VIEW   6/1/2020 11:11 AM     HISTORY: Postoperative.    COMPARISON: Intraoperative film from earlier today.      Impression    IMPRESSION: Left total hip arthroplasty in place. No evidence of  complication.    MD Anders LAMAS MD , MD.

## 2020-06-02 NOTE — PLAN OF CARE
Patient plan: Home, today  Current status: Patient completing supine<>sit with Min A to support L LE with transition to sitting at EOB, sit<>stand with FWW and mod I, gait x 120 feet with FWW and supervision to mod I, slow partial passing gait with some pain noted; navigation of 4 steps with single hand rail and SEC with SBA. Reviewed supine ANGELICA exercises and provided with handout, instructed on frequency of completion at home. All IP PT goals sufficiently met for discharge.   Anticipated status at discharge: FWW for mobility/ambulation, Min A for bed mobility, SBA for stair navigation with SEC    Physical Therapy Discharge Summary    Reason for therapy discharge:    All goals sufficiently met for discharge    Progress towards therapy goal(s). See goals on Care Plan in Epic electronic health record for goal details.  All goals sufficiently met for discharge    Therapy recommendation(s):    FWW for mobility/ambulation, Min A for bed mobility, SBA for stair navigation with SEC

## 2020-06-02 NOTE — PROGRESS NOTES
"Orthopedic Surgery  6/2/2020    S: Patient voices no complaints today.     O: Blood pressure 136/67, pulse 99, temperature 99  F (37.2  C), temperature source Temporal, resp. rate 16, height 1.6 m (5' 3\"), weight 72.8 kg (160 lb 9.6 oz), last menstrual period 05/30/2007, SpO2 100 %.  Lab Results   Component Value Date    HGB 13.0 05/13/2020     No results found for: INR     Neurovascularly intact.  Calves are negative bilaterally, both soft and nontender.  The wound is C/D/I.  The wound looks good with minimal erythema of the surrounding skin.    A: Ms. Mart is doing well status post Procedure(s):  Left total hip arthroplasty.    P: Continue physical therapy.   Anticoagulation home on ASA  Pain management  Discharge planning, home today    Lorenzo Hunt MD  [phone]    "

## 2020-06-03 DIAGNOSIS — F41.0 PANIC DISORDER WITHOUT AGORAPHOBIA: ICD-10-CM

## 2020-06-03 RX ORDER — ALPRAZOLAM 0.25 MG
0.25 TABLET ORAL 3 TIMES DAILY PRN
Qty: 20 TABLET | Refills: 0 | Status: CANCELLED | OUTPATIENT
Start: 2020-06-03

## 2020-06-03 NOTE — PLAN OF CARE
[facility]      OUTPATIENT PHYSICAL THERAPY EVALUATION  PLAN OF TREATMENT FOR OUTPATIENT REHABILITATION  (COMPLETE FOR INITIAL CLAIMS ONLY)  Patient's Last Name, First Name, M.I.  YOB: 1960  Mariah Mart                        Provider's Name  [facility] Medical Record No.  [id number]                               Onset Date:  06/01/20   Start of Care Date:    6/1/2020     Type:     _X_PT   ___OT   ___SLP Medical Diagnosis:   L ANGELICA                        PT Diagnosis:  impaired functional mobility   Visits from SOC:  1   _________________________________________________________________________________  Plan of Treatment/Functional Goals    Planned Interventions:  ,    balance training, bed mobility training, gait training, ROM, strengthening, transfer training, home program guidelines, progressive activity/exercise,       Goals: See Physical Therapy Goals on Care Plan in [facility] electronic health record.    Therapy Frequency: Daily  Predicted Duration of Therapy Intervention: 2 days  _________________________________________________________________________________    I CERTIFY THE NEED FOR THESE SERVICES FURNISHED UNDER        THIS PLAN OF TREATMENT AND WHILE UNDER MY CARE     (Physician co-signature of this document indicates review and certification of the therapy plan).               ,      Referring Physician: Lorenzo Hunt MD             Initial Assessment        See Physical Therapy evaluation dated   in Epic electronic health record.

## 2020-06-04 RX ORDER — HYDROXYZINE PAMOATE 50 MG/1
50-100 CAPSULE ORAL 3 TIMES DAILY PRN
Qty: 30 CAPSULE | Refills: 0 | Status: SHIPPED | OUTPATIENT
Start: 2020-06-04 | End: 2020-06-23

## 2020-06-04 NOTE — TELEPHONE ENCOUNTER
Last prescription 3/19.  Gets for panic.  review reveals she has also been getting narcotics from ortho for her hip arthritis (6/1 got oxycodone 5mg 60 tabs so is taking regularly) and the two cannot be taken together.  Rx changed to hydroxyzine instead.  Please let her know

## 2020-06-04 NOTE — TELEPHONE ENCOUNTER
Spoke to patient. Relayed provider message below. Patient agreeable to plan.  Marina ISAAC RN, BSN

## 2020-06-07 DIAGNOSIS — M54.50 CHRONIC BILATERAL LOW BACK PAIN WITHOUT SCIATICA: Primary | ICD-10-CM

## 2020-06-07 DIAGNOSIS — G89.29 CHRONIC BILATERAL LOW BACK PAIN WITHOUT SCIATICA: Primary | ICD-10-CM

## 2020-06-08 RX ORDER — METAXALONE 800 MG/1
TABLET ORAL
Qty: 30 TABLET | Refills: 0 | Status: SHIPPED | OUTPATIENT
Start: 2020-06-08 | End: 2020-08-04

## 2020-06-15 ENCOUNTER — TRANSFERRED RECORDS (OUTPATIENT)
Dept: HEALTH INFORMATION MANAGEMENT | Facility: CLINIC | Age: 60
End: 2020-06-15

## 2020-06-19 ENCOUNTER — TELEPHONE (OUTPATIENT)
Dept: PEDIATRICS | Facility: CLINIC | Age: 60
End: 2020-06-19

## 2020-06-19 NOTE — TELEPHONE ENCOUNTER
I called and spoke to the pt and she does want to go on this medication before her appointment on 7/7/20. She declines seeing anyone else and wants to see her pcp. Please advise when and where to overbook or give her a short term fill until she can be seen. Thank you.   Dora Richardson on 6/19/2020 at 12:06 PM

## 2020-06-19 NOTE — TELEPHONE ENCOUNTER
Medication Question or Refill  Who is calling: Patient  What medication are you calling about (include dose and sig)?: xanax  Controlled Substance Agreement on file: No  Who prescribed the medication?: Dr Dickerson  Do you need a refill? Yes: wants to get back on this medication  When did you use the medication last? Over a year  Patient offered an appointment? No  Do you have any questions or concerns?  Yes: wants to get back on to help get off narcotics.    Requested Pharmacy: [facility] to leave a detailed message?: Yes at Cell number on file:    Telephone Information:   Mobile [phone]

## 2020-06-19 NOTE — TELEPHONE ENCOUNTER
The pt is aware and scheduled for her upcoming appointment.   Dora Richardson on 6/19/2020 at 1:53 PM

## 2020-06-19 NOTE — TELEPHONE ENCOUNTER
Pt is scheduled for a physical.  If she wants this medication now then she needs a sooner appt to discuss this specifically with provider.    Master Barton RN, BSN

## 2020-06-23 ENCOUNTER — VIRTUAL VISIT (OUTPATIENT)
Dept: PEDIATRICS | Facility: CLINIC | Age: 60
End: 2020-06-23
Payer: COMMERCIAL

## 2020-06-23 DIAGNOSIS — F41.0 PANIC DISORDER WITHOUT AGORAPHOBIA: ICD-10-CM

## 2020-06-23 DIAGNOSIS — M25.552 HIP PAIN, LEFT: ICD-10-CM

## 2020-06-23 DIAGNOSIS — F51.04 PSYCHOPHYSIOLOGICAL INSOMNIA: Primary | ICD-10-CM

## 2020-06-23 PROCEDURE — 99214 OFFICE O/P EST MOD 30 MIN: CPT | Mod: 95 | Performed by: INTERNAL MEDICINE

## 2020-06-23 RX ORDER — HYDROXYZINE PAMOATE 50 MG/1
50-100 CAPSULE ORAL 3 TIMES DAILY PRN
Qty: 180 CAPSULE | Refills: 1 | Status: SHIPPED | OUTPATIENT
Start: 2020-06-23 | End: 2021-06-18

## 2020-06-23 NOTE — PATIENT INSTRUCTIONS
Patient Education     Treating Insomnia     Learning to relax before bedtime can improve your sleep.   Good sleeping habits are a key part of treatment. If needed, some medicines may help you sleep better at first. Making healthy lifestyle changes and learning to relax can improve your sleep. Treating insomnia takes commitment. But trust that your efforts will pay off. Be sure to talk with your healthcare provider before taking any medicine.  Healthy lifestyle  Your lifestyle affects your health and your sleep. Here are some healthy habits:    Keep a regular sleep schedule. Go to bed and get up at the same time each day.    Exercise regularly. It may help you reduce stress. Avoid strenuous exercise for 2 to 4 hours before bedtime.    Avoid or limit naps, especially in the late afternoon.    Use your bed only for sleep and sex.    Don t spend too much time in bed trying to fall asleep. If you can t fall asleep, get up and do something (no electronics) until you become tired and drowsy.    Avoid or limit caffeine and nicotine for up to 6 hours before bedtime. They can keep you awake at night.     Also avoid alcohol for at least 4 to 6 hours before bedtime. It may help you fall asleep at first. But you will have more awakenings during the night. And your sleep will not be restful.  Before bedtime  To sleep better every night, try these tips:    Have a bedtime routine to let your body and mind know when it s time to sleep.    Think of going to bed as relaxing and enjoyable. Sleep will come sooner.    If your worries don t let you sleep, write them down in a diary. Then close it, and go to bed.    Make sure the room is not too hot or too cold. If it s not dark enough, an eye mask can help. If it s noisy, try using earplugs.    Don't eat a large meal just before bedtime.    Remove noises, bright lights, TVs, cell phones, and computers from your sleeping environment.    Use a comfortable mattress and pillow.  Learn to  relax  Stress, anxiety, and body tension may keep you awake at night. To unwind before bedtime, try a warm bath, meditation, or yoga. Also try the following:    Deep breathing. Sit or lie back in a chair. Take a slow, deep breath. Hold it for 5 counts. Then breathe out slowly through your mouth. Keep doing this until you feel relaxed.    Progressive muscle relaxation. Tense and then relax the muscles in your body as you breathe deeply. Start with your feet and work up your body to your neck and face.  Cognitive Behavioral Therapy (CBT)  CBT is the most effective treatment for long-term insomnia. It tries to address the underlying causes of your sleep problems, including your habits and how you think about sleep.  Individual Therapy  Ld Daniel PsyD, BYRON  Insomnia   [place] [facility]: [phone]    [facility]: [phone]  [facility]  Visit www.[place].org or call [phone] to find a clinic close to you.  Suggested resources  The resources below may help you relax. This list is for information only. [facility] is not responsible for the quality of services or the actions of any person or organization. There may be a fee to use some of these resources.  Insomnia treatment books  Khalida Mind by Gayatri German and Patricia Tobin (2013)  Overcoming Insomnia by Nikhil Waters and Gayatri German (2008)  Quiet Your Mind and Get to Sleep: Solutions to Insomnia for Those with Depression, Anxiety or Chronic Pain by Gayatri German and Patricia Tobin (2009)  No More Sleepless Nights by Dilip Molina and Antonette Lake (1996)  Say Khalida to Insomnia by Faustino Barron (2009)  The Insomnia Workbook by Michelle Hood and Nathan Cohen (2009)  The Insomnia Answer by Angus Morales and Venkata Lopes (2006)  Stress management and relaxation books  The Relaxation and Stress Reduction Workbook by Cecilia Persaud,  Eugenia Diaz and Anders Sauceda (2008)  Stress Management Workbook: Techniques and Self-Assessment Procedures by Ary Morgan and Narayan Randolph (1997)  A Mindfulness-Based Stress Reduction Workbook by Tyrone Shah and Aimee Larios (2010)  The Complete Stress Management Workbook by Navarro Moreno, Oscar Cadet and Rhys Rowell (1996)  Online programs  www.SHUTi.me (pronounced shut eye). There is a fee for this program.   www.sleepIO.com (pronounced sleep ee oh). There is a fee for this program.  Other online resources  Deep breathing and progressive muscle relaxation (PMR):    http://www.Recommendi  Meditation:    www.LendingStarranOutdoor Promotions    www.Attractive Black Singles LLCguidedIgnite100meditationIgnite100site.com  Guided imagery:    http://www.Recommendi    http://DIIME.Vendormate  (click on  Resource Library,  then choose  Meditation, Relaxation, Guided Imagery )  Apps for your mobile device:    Autogenic Training Progressive Muscle Relaxation by Conjure Mark Anthony    Calm: Meditation and Sleep Stories by Calm.com, Inc.    Seclore Timer - Meditation Sergio by LensVector.  This is not a prescription and these resources are optional. You must pay for any costs when using these resources. Please ask your insurance carrier if you can be reimbursed for these resources. If so, you are responsible for sending the needed details to your insurance carrier. These resources may also be tax deductible as medical expenses. Check with your .  Date Last Reviewed: 5/18/2018  Modifications clinically reviewed by Ld Daniel PsyD, BYRON, [facility], Director of the Insomnia and Behavioral Sleep Health Program, [facility].    0501-9396 The kwiry. [address] [place] [address], [place] [address]. All rights reserved. This information is not intended as a substitute for professional medical care. Always follow your healthcare professional's instructions. This information has been modified by  your health care provider with permission from the publisher.

## 2020-06-23 NOTE — PROGRESS NOTES
"Mariah Mart is a 59 year old female who is being evaluated via a billable telephone visit.      The patient has been notified of following:     \"This telephone visit will be conducted via a call between you and your physician/provider. We have found that certain health care needs can be provided without the need for a physical exam.  This service lets us provide the care you need with a short phone conversation.  If a prescription is necessary we can send it directly to your pharmacy.  If lab work is needed we can place an order for that and you can then stop by our lab to have the test done at a later time.    Telephone visits are billed at different rates depending on your insurance coverage. During this emergency period, for some insurers they may be billed the same as an in-person visit.  Please reach out to your insurance provider with any questions.    If during the course of the call the physician/provider feels a telephone visit is not appropriate, you will not be charged for this service.\"    Patient has given verbal consent for Telephone visit?  Yes    What phone number would you like to be contacted at? [phone]     How would you like to obtain your AVS? 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     OTTO-7 SCORE 9/9/2014 11/29/2016 2/2/2018   Total Score 5 - -   Total Score - 13 14     PHQ-9 SCORE 6/16/2009 11/29/2016 2/2/2018   PHQ-9 Total Score 1 - -   PHQ-9 Total Score - 8 8       Pain with ANGELICA on 6/3/2020 - has been using walker and hard to transition off due to pain.  Got PT referral to " help and now using cane. Did ultrasound with PT which seems to help some but back has excruciating pain.  Has follow-up appts made.  Is on percocet due to pain but can't sleep.  Wakes to urinate.  Has a hard time sleeping - mind races and one area of pain in back.  Lidocaine patch doesn't help.   Has had multiple oxycodone per , no benzo. Wants to get off narcotics but feels like needs them to sleep now.  Skelaxin has helped taking three times daily for back pain  Hydroxyzine also helpful, has not tried 2 tabs  Tylenol 1000 three times daily   Has side effects with gabapentin but has not had amitriptyline or trazodone.       Reviewed and updated as needed this visit by Provider  Meds         Review of Systems          Objective   Reported vitals:  LMP 05/30/2007    healthy, alert and no distress  PSYCH: Alert and oriented times 3; coherent speech, normal   rate and volume, able to articulate logical thoughts, able   to abstract reason, no tangential thoughts, no hallucinations   or delusions  Her affect is normal  RESP: No cough, no audible wheezing, able to talk in full sentences  Remainder of exam unable to be completed due to telephone visits          Assessment/Plan:  1. Psychophysiological insomnia  The problem of recurrent insomnia is discussed. Avoidance of caffeine sources is strongly encouraged. Sleep hygiene issues are reviewed. The use of medications including sedative hypnotics, anti-depressants and other medications were reviewed.  Potential for dependence on some of these drugs was discussed.  Rx per orders. Avoid benzo if at all possible discussed, ulices with narcotics at home    2. Panic disorder without agoraphobia  Declines increase in buspar for now, continue vistaril as needed with extra dose at bedtime as needed   - hydrOXYzine (VISTARIL) 50 MG capsule; Take 1-2 capsules ( mg) by mouth 3 times daily as needed for anxiety (insomnia)  Dispense: 180 capsule; Refill: 1    3. Hip pain,  left  Continue PT, follow-up with ortho, wean off narcotics.      Return in about 3 months (around 9/23/2020) for Physical Exam.      Phone call duration:  22 minutes    Jennifer Dickerson MD

## 2020-07-14 ENCOUNTER — TRANSFERRED RECORDS (OUTPATIENT)
Dept: HEALTH INFORMATION MANAGEMENT | Facility: CLINIC | Age: 60
End: 2020-07-14

## 2020-08-03 DIAGNOSIS — I10 ESSENTIAL HYPERTENSION, BENIGN: ICD-10-CM

## 2020-08-03 DIAGNOSIS — G89.29 CHRONIC BILATERAL LOW BACK PAIN WITHOUT SCIATICA: ICD-10-CM

## 2020-08-03 DIAGNOSIS — M54.50 CHRONIC BILATERAL LOW BACK PAIN WITHOUT SCIATICA: ICD-10-CM

## 2020-08-04 RX ORDER — AMLODIPINE BESYLATE 5 MG/1
TABLET ORAL
Qty: 90 TABLET | Refills: 1 | Status: SHIPPED | OUTPATIENT
Start: 2020-08-04 | End: 2020-12-31

## 2020-08-04 RX ORDER — METAXALONE 800 MG/1
800 TABLET ORAL 3 TIMES DAILY PRN
Qty: 90 TABLET | Refills: 1 | Status: SHIPPED | OUTPATIENT
Start: 2020-08-04 | End: 2022-08-09

## 2020-08-04 NOTE — TELEPHONE ENCOUNTER
Routing refill request to provider for review/approval because:  Drug interaction warning and not on protocol     Shahnaz Santiago RN

## 2020-08-25 ENCOUNTER — TRANSFERRED RECORDS (OUTPATIENT)
Dept: HEALTH INFORMATION MANAGEMENT | Facility: CLINIC | Age: 60
End: 2020-08-25

## 2020-09-01 DIAGNOSIS — I10 ESSENTIAL HYPERTENSION, BENIGN: ICD-10-CM

## 2020-09-01 RX ORDER — LISINOPRIL 40 MG/1
40 TABLET ORAL DAILY
Qty: 90 TABLET | Refills: 1 | Status: SHIPPED | OUTPATIENT
Start: 2020-09-01 | End: 2021-02-04

## 2020-09-01 NOTE — TELEPHONE ENCOUNTER
Prescription approved per [facility] Protocol.    Mason Hernandez RN

## 2020-12-07 ENCOUNTER — TRANSFERRED RECORDS (OUTPATIENT)
Dept: HEALTH INFORMATION MANAGEMENT | Facility: CLINIC | Age: 60
End: 2020-12-07

## 2020-12-29 DIAGNOSIS — I10 ESSENTIAL HYPERTENSION, BENIGN: ICD-10-CM

## 2020-12-31 RX ORDER — AMLODIPINE BESYLATE 5 MG/1
TABLET ORAL
Qty: 90 TABLET | Refills: 1 | Status: SHIPPED | OUTPATIENT
Start: 2020-12-31 | End: 2021-06-18

## 2020-12-31 NOTE — TELEPHONE ENCOUNTER
Routing refill request to provider for review/approval because:  Drug interaction warning    Joseluis PEARCE RN, BSN

## 2021-03-11 DIAGNOSIS — E78.5 HYPERLIPIDEMIA LDL GOAL <160: ICD-10-CM

## 2021-03-12 RX ORDER — SIMVASTATIN 40 MG
TABLET ORAL
Qty: 90 TABLET | Refills: 0 | Status: SHIPPED | OUTPATIENT
Start: 2021-03-12 | End: 2021-06-18

## 2021-03-12 NOTE — TELEPHONE ENCOUNTER
Routing refill request to provider for review/approval because:  Drug interaction warning  Labs not current:  Soon to be expiring LDL    Shavon Awad RN on 3/12/2021 at 9:14 AM

## 2021-04-17 DIAGNOSIS — G44.89 OTHER HEADACHE SYNDROME: ICD-10-CM

## 2021-04-17 DIAGNOSIS — F41.0 PANIC DISORDER WITHOUT AGORAPHOBIA: ICD-10-CM

## 2021-04-19 RX ORDER — DULOXETIN HYDROCHLORIDE 60 MG/1
60 CAPSULE, DELAYED RELEASE ORAL DAILY
Qty: 90 CAPSULE | Refills: 0 | Status: SHIPPED | OUTPATIENT
Start: 2021-04-19 | End: 2021-06-14

## 2021-05-06 DIAGNOSIS — F41.0 PANIC DISORDER WITHOUT AGORAPHOBIA: ICD-10-CM

## 2021-05-07 RX ORDER — BUSPIRONE HYDROCHLORIDE 10 MG/1
10 TABLET ORAL 2 TIMES DAILY
Qty: 180 TABLET | Refills: 0 | Status: SHIPPED | OUTPATIENT
Start: 2021-05-07 | End: 2021-06-18

## 2021-05-07 NOTE — TELEPHONE ENCOUNTER
The pt is aware and scheduled for her upcoming appointment.   Dora Richardson on 5/7/2021 at 11:29 AM

## 2021-05-07 NOTE — TELEPHONE ENCOUNTER
Medication is being filled for 1 time refill only due to:  Patient needs to be seen because needs appt June.    Routing to MA/TC pool. The Pt is due for a visit with PCP    Master Barton RN, BSN

## 2021-06-18 ENCOUNTER — OFFICE VISIT (OUTPATIENT)
Dept: PEDIATRICS | Facility: CLINIC | Age: 61
End: 2021-06-18
Payer: COMMERCIAL

## 2021-06-18 VITALS
RESPIRATION RATE: 14 BRPM | HEART RATE: 78 BPM | TEMPERATURE: 97.5 F | WEIGHT: 170.5 LBS | HEIGHT: 63 IN | BODY MASS INDEX: 30.21 KG/M2 | SYSTOLIC BLOOD PRESSURE: 110 MMHG | OXYGEN SATURATION: 99 % | DIASTOLIC BLOOD PRESSURE: 66 MMHG

## 2021-06-18 DIAGNOSIS — E78.5 HYPERLIPIDEMIA LDL GOAL <160: ICD-10-CM

## 2021-06-18 DIAGNOSIS — Z12.4 CERVICAL CANCER SCREENING: ICD-10-CM

## 2021-06-18 DIAGNOSIS — F41.0 PANIC DISORDER WITHOUT AGORAPHOBIA: ICD-10-CM

## 2021-06-18 DIAGNOSIS — Z11.59 SCREENING FOR VIRAL DISEASE: ICD-10-CM

## 2021-06-18 DIAGNOSIS — I10 ESSENTIAL HYPERTENSION, BENIGN: ICD-10-CM

## 2021-06-18 DIAGNOSIS — G44.89 OTHER HEADACHE SYNDROME: ICD-10-CM

## 2021-06-18 DIAGNOSIS — Z12.31 VISIT FOR SCREENING MAMMOGRAM: ICD-10-CM

## 2021-06-18 DIAGNOSIS — Z00.00 ROUTINE GENERAL MEDICAL EXAMINATION AT A HEALTH CARE FACILITY: Primary | ICD-10-CM

## 2021-06-18 LAB
ANION GAP SERPL CALCULATED.3IONS-SCNC: 4 MMOL/L (ref 3–14)
BUN SERPL-MCNC: 9 MG/DL (ref 7–30)
CALCIUM SERPL-MCNC: 9.5 MG/DL (ref 8.5–10.1)
CHLORIDE SERPL-SCNC: 104 MMOL/L (ref 94–109)
CHOLEST SERPL-MCNC: 176 MG/DL
CO2 SERPL-SCNC: 29 MMOL/L (ref 20–32)
CREAT SERPL-MCNC: 0.69 MG/DL (ref 0.52–1.04)
GFR SERPL CREATININE-BSD FRML MDRD: >90 ML/MIN/{1.73_M2}
GLUCOSE SERPL-MCNC: 93 MG/DL (ref 70–99)
HDLC SERPL-MCNC: 60 MG/DL
LDLC SERPL CALC-MCNC: 97 MG/DL
NONHDLC SERPL-MCNC: 116 MG/DL
POTASSIUM SERPL-SCNC: 4.4 MMOL/L (ref 3.4–5.3)
SODIUM SERPL-SCNC: 137 MMOL/L (ref 133–144)
TRIGL SERPL-MCNC: 96 MG/DL

## 2021-06-18 PROCEDURE — 99213 OFFICE O/P EST LOW 20 MIN: CPT | Mod: 25 | Performed by: INTERNAL MEDICINE

## 2021-06-18 PROCEDURE — 36415 COLL VENOUS BLD VENIPUNCTURE: CPT | Performed by: INTERNAL MEDICINE

## 2021-06-18 PROCEDURE — 99396 PREV VISIT EST AGE 40-64: CPT | Mod: 25 | Performed by: INTERNAL MEDICINE

## 2021-06-18 PROCEDURE — 86769 SARS-COV-2 COVID-19 ANTIBODY: CPT | Performed by: INTERNAL MEDICINE

## 2021-06-18 PROCEDURE — 86769 SARS-COV-2 COVID-19 ANTIBODY: CPT | Mod: 59 | Performed by: INTERNAL MEDICINE

## 2021-06-18 PROCEDURE — 87624 HPV HI-RISK TYP POOLED RSLT: CPT | Performed by: INTERNAL MEDICINE

## 2021-06-18 PROCEDURE — 90471 IMMUNIZATION ADMIN: CPT | Performed by: INTERNAL MEDICINE

## 2021-06-18 PROCEDURE — 80061 LIPID PANEL: CPT | Performed by: INTERNAL MEDICINE

## 2021-06-18 PROCEDURE — 90750 HZV VACC RECOMBINANT IM: CPT | Performed by: INTERNAL MEDICINE

## 2021-06-18 PROCEDURE — 80048 BASIC METABOLIC PNL TOTAL CA: CPT | Performed by: INTERNAL MEDICINE

## 2021-06-18 PROCEDURE — G0145 SCR C/V CYTO,THINLAYER,RESCR: HCPCS | Performed by: INTERNAL MEDICINE

## 2021-06-18 RX ORDER — ATORVASTATIN CALCIUM 20 MG/1
20 TABLET, FILM COATED ORAL DAILY
Qty: 90 TABLET | Refills: 4 | Status: SHIPPED | OUTPATIENT
Start: 2021-06-18 | End: 2022-08-09

## 2021-06-18 RX ORDER — AMLODIPINE BESYLATE 5 MG/1
5 TABLET ORAL DAILY
Qty: 90 TABLET | Refills: 3 | Status: SHIPPED | OUTPATIENT
Start: 2021-06-18 | End: 2022-05-04

## 2021-06-18 RX ORDER — LISINOPRIL 40 MG/1
40 TABLET ORAL DAILY
Qty: 90 TABLET | Refills: 3 | Status: SHIPPED | OUTPATIENT
Start: 2021-06-18 | End: 2022-08-09

## 2021-06-18 RX ORDER — BUSPIRONE HYDROCHLORIDE 15 MG/1
15 TABLET ORAL 2 TIMES DAILY
Qty: 180 TABLET | Refills: 0 | Status: SHIPPED | OUTPATIENT
Start: 2021-06-18 | End: 2021-10-14

## 2021-06-18 RX ORDER — DULOXETIN HYDROCHLORIDE 60 MG/1
60 CAPSULE, DELAYED RELEASE ORAL DAILY
Qty: 90 CAPSULE | Refills: 4 | Status: SHIPPED | OUTPATIENT
Start: 2021-06-18 | End: 2022-08-09

## 2021-06-18 ASSESSMENT — ENCOUNTER SYMPTOMS
HEMATURIA: 0
SORE THROAT: 0
DIARRHEA: 0
DYSURIA: 0
COUGH: 0
ABDOMINAL PAIN: 0
BREAST MASS: 0
HEADACHES: 0
MYALGIAS: 0
ARTHRALGIAS: 0
SHORTNESS OF BREATH: 0
CHILLS: 0
PARESTHESIAS: 0
PALPITATIONS: 0
NERVOUS/ANXIOUS: 1
HEMATOCHEZIA: 0
DIZZINESS: 0
EYE PAIN: 0
JOINT SWELLING: 0
FREQUENCY: 0
HEARTBURN: 0
NAUSEA: 0
WEAKNESS: 0
FEVER: 0
CONSTIPATION: 0

## 2021-06-18 ASSESSMENT — MIFFLIN-ST. JEOR: SCORE: 1303.54
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
## 2021-06-18 NOTE — PATIENT INSTRUCTIONS
Preventive Health Recommendations  Female Ages 50 - 64    Yearly exam: See your health care provider every year in order to  o Review health changes.   o Discuss preventive care.    o Review your medicines if your doctor has prescribed any.      Get a Pap test every three years (unless you have an abnormal result and your provider advises testing more often).    If you get Pap tests with HPV test, you only need to test every 5 years, unless you have an abnormal result.       Have a mammogram every 1 to 2 years.    Have a colonoscopy at age 50, or have a yearly FIT test (stool test). These exams screen for colon cancer.      Have a cholesterol test every year    Have a diabetes test (fasting glucose) every three years. If you are at risk for diabetes, you should have this test more often.       Shots: Get a flu shot each year. Get a tetanus shot every 10 years.  Get your second shingles shot in 2 months or more    Nutrition:     Eat at least 5 servings of fruits and vegetables each day.    Eat whole-grain bread, whole-wheat pasta and brown rice instead of white grains and rice.    Get adequate Calcium and Vitamin D.     Lifestyle    Exercise at least 150 minutes a week (30 minutes a day, 5 days a week). This will help you control your weight and prevent disease.    Limit alcohol to one drink per day.    No smoking.     Wear sunscreen to prevent skin cancer.     See your dentist every six months for an exam and cleaning.     See your eye doctor every 1 to 2 years.    Increase the buspar to 15mg twice daily.  Let me know if that isn't helping enough, it takes 6 weeks to see benefit.

## 2021-06-19 LAB
SARS-COV-2 AB PNL SERPL IA: POSITIVE
SARS-COV-2 IGG SERPL IA-ACNC: NORMAL

## 2021-06-22 LAB
COPATH REPORT: NORMAL
PAP: NORMAL

## 2021-06-24 LAB
FINAL DIAGNOSIS: NORMAL
HPV HR 12 DNA CVX QL NAA+PROBE: NEGATIVE
HPV16 DNA SPEC QL NAA+PROBE: NEGATIVE
HPV18 DNA SPEC QL NAA+PROBE: NEGATIVE
SPECIMEN DESCRIPTION: NORMAL
SPECIMEN SOURCE CVX/VAG CYTO: NORMAL

## 2021-07-02 ENCOUNTER — ANCILLARY PROCEDURE (OUTPATIENT)
Dept: MAMMOGRAPHY | Facility: CLINIC | Age: 61
End: 2021-07-02
Payer: COMMERCIAL

## 2021-07-02 DIAGNOSIS — Z12.31 VISIT FOR SCREENING MAMMOGRAM: ICD-10-CM

## 2021-07-02 PROCEDURE — 77067 SCR MAMMO BI INCL CAD: CPT | Mod: TC | Performed by: RADIOLOGY

## 2021-07-02 PROCEDURE — 77063 BREAST TOMOSYNTHESIS BI: CPT | Mod: TC | Performed by: RADIOLOGY

## 2021-10-13 DIAGNOSIS — F41.0 PANIC DISORDER WITHOUT AGORAPHOBIA: ICD-10-CM

## 2021-10-14 RX ORDER — BUSPIRONE HYDROCHLORIDE 15 MG/1
15 TABLET ORAL 2 TIMES DAILY
Qty: 180 TABLET | Refills: 1 | Status: SHIPPED | OUTPATIENT
Start: 2021-10-14 | End: 2022-04-18

## 2021-10-14 NOTE — TELEPHONE ENCOUNTER
Prescription approved per [facility] Refill Protocol.  Rosa Rebolledo RN

## 2021-10-20 ENCOUNTER — ALLIED HEALTH/NURSE VISIT (OUTPATIENT)
Dept: PEDIATRICS | Facility: CLINIC | Age: 61
End: 2021-10-20
Payer: COMMERCIAL

## 2021-10-20 DIAGNOSIS — Z23 ENCOUNTER FOR IMMUNIZATION: Primary | ICD-10-CM

## 2021-10-20 PROCEDURE — 99207 PR NO CHARGE NURSE ONLY: CPT

## 2021-10-20 PROCEDURE — 90686 IIV4 VACC NO PRSV 0.5 ML IM: CPT

## 2021-10-20 PROCEDURE — 90471 IMMUNIZATION ADMIN: CPT

## 2021-11-17 ENCOUNTER — TRANSFERRED RECORDS (OUTPATIENT)
Dept: HEALTH INFORMATION MANAGEMENT | Facility: CLINIC | Age: 61
End: 2021-11-17
Payer: COMMERCIAL

## 2022-02-17 ENCOUNTER — TRANSFERRED RECORDS (OUTPATIENT)
Dept: HEALTH INFORMATION MANAGEMENT | Facility: CLINIC | Age: 62
End: 2022-02-17

## 2022-05-02 DIAGNOSIS — I10 ESSENTIAL HYPERTENSION, BENIGN: ICD-10-CM

## 2022-05-04 RX ORDER — AMLODIPINE BESYLATE 5 MG/1
5 TABLET ORAL DAILY
Qty: 90 TABLET | Refills: 0 | Status: SHIPPED | OUTPATIENT
Start: 2022-05-04 | End: 2022-08-09

## 2022-05-04 NOTE — TELEPHONE ENCOUNTER
The pharmacy was calling and the pt has no remaining refills of this med and is out and would like this refilled as quickly as possible. Thank you.   Dora Richardson on 5/4/2022 at 11:30 AM

## 2022-08-09 ENCOUNTER — OFFICE VISIT (OUTPATIENT)
Dept: PEDIATRICS | Facility: CLINIC | Age: 62
End: 2022-08-09
Payer: COMMERCIAL

## 2022-08-09 VITALS
OXYGEN SATURATION: 98 % | TEMPERATURE: 98.1 F | DIASTOLIC BLOOD PRESSURE: 72 MMHG | SYSTOLIC BLOOD PRESSURE: 134 MMHG | HEIGHT: 63 IN | BODY MASS INDEX: 31.7 KG/M2 | HEART RATE: 86 BPM | WEIGHT: 178.9 LBS | RESPIRATION RATE: 16 BRPM

## 2022-08-09 DIAGNOSIS — E78.5 HYPERLIPIDEMIA LDL GOAL <160: ICD-10-CM

## 2022-08-09 DIAGNOSIS — I10 ESSENTIAL HYPERTENSION, BENIGN: ICD-10-CM

## 2022-08-09 DIAGNOSIS — G44.89 OTHER HEADACHE SYNDROME: ICD-10-CM

## 2022-08-09 DIAGNOSIS — Z13.1 SCREENING FOR DIABETES MELLITUS: ICD-10-CM

## 2022-08-09 DIAGNOSIS — Z12.31 VISIT FOR SCREENING MAMMOGRAM: ICD-10-CM

## 2022-08-09 DIAGNOSIS — Z00.00 ROUTINE GENERAL MEDICAL EXAMINATION AT A HEALTH CARE FACILITY: Primary | ICD-10-CM

## 2022-08-09 DIAGNOSIS — F41.0 PANIC DISORDER WITHOUT AGORAPHOBIA: ICD-10-CM

## 2022-08-09 PROBLEM — H35.30 MACULAR DEGENERATION (SENILE) OF RETINA: Status: ACTIVE | Noted: 2022-08-09

## 2022-08-09 LAB — HBA1C MFR BLD: 5.9 % (ref 0–5.6)

## 2022-08-09 PROCEDURE — 36415 COLL VENOUS BLD VENIPUNCTURE: CPT | Performed by: INTERNAL MEDICINE

## 2022-08-09 PROCEDURE — 83036 HEMOGLOBIN GLYCOSYLATED A1C: CPT | Performed by: INTERNAL MEDICINE

## 2022-08-09 PROCEDURE — 80048 BASIC METABOLIC PNL TOTAL CA: CPT | Performed by: INTERNAL MEDICINE

## 2022-08-09 PROCEDURE — 99396 PREV VISIT EST AGE 40-64: CPT | Performed by: INTERNAL MEDICINE

## 2022-08-09 PROCEDURE — 80061 LIPID PANEL: CPT | Performed by: INTERNAL MEDICINE

## 2022-08-09 RX ORDER — LISINOPRIL 40 MG/1
40 TABLET ORAL DAILY
Qty: 90 TABLET | Refills: 3 | Status: SHIPPED | OUTPATIENT
Start: 2022-08-09 | End: 2023-08-15

## 2022-08-09 RX ORDER — DULOXETIN HYDROCHLORIDE 60 MG/1
60 CAPSULE, DELAYED RELEASE ORAL DAILY
Qty: 90 CAPSULE | Refills: 4 | Status: SHIPPED | OUTPATIENT
Start: 2022-08-09 | End: 2023-09-19

## 2022-08-09 RX ORDER — BUSPIRONE HYDROCHLORIDE 15 MG/1
15 TABLET ORAL 2 TIMES DAILY
Qty: 180 TABLET | Refills: 3 | Status: SHIPPED | OUTPATIENT
Start: 2022-08-09 | End: 2023-08-15

## 2022-08-09 RX ORDER — AMLODIPINE BESYLATE 5 MG/1
5 TABLET ORAL DAILY
Qty: 90 TABLET | Refills: 3 | Status: SHIPPED | OUTPATIENT
Start: 2022-08-09 | End: 2023-09-19

## 2022-08-09 RX ORDER — ATORVASTATIN CALCIUM 20 MG/1
20 TABLET, FILM COATED ORAL DAILY
Qty: 90 TABLET | Refills: 4 | Status: SHIPPED | OUTPATIENT
Start: 2022-08-09 | End: 2023-09-19

## 2022-08-09 SDOH — HEALTH STABILITY: PHYSICAL HEALTH: ON AVERAGE, HOW MANY DAYS PER WEEK DO YOU ENGAGE IN MODERATE TO STRENUOUS EXERCISE (LIKE A BRISK WALK)?: 2 DAYS

## 2022-08-09 SDOH — ECONOMIC STABILITY: INCOME INSECURITY: HOW HARD IS IT FOR YOU TO PAY FOR THE VERY BASICS LIKE FOOD, HOUSING, MEDICAL CARE, AND HEATING?: NOT HARD AT ALL

## 2022-08-09 SDOH — HEALTH STABILITY: PHYSICAL HEALTH: ON AVERAGE, HOW MANY MINUTES DO YOU ENGAGE IN EXERCISE AT THIS LEVEL?: 10 MIN

## 2022-08-09 SDOH — ECONOMIC STABILITY: TRANSPORTATION INSECURITY
IN THE PAST 12 MONTHS, HAS LACK OF TRANSPORTATION KEPT YOU FROM MEETINGS, WORK, OR FROM GETTING THINGS NEEDED FOR DAILY LIVING?: NO

## 2022-08-09 SDOH — ECONOMIC STABILITY: FOOD INSECURITY: WITHIN THE PAST 12 MONTHS, YOU WORRIED THAT YOUR FOOD WOULD RUN OUT BEFORE YOU GOT MONEY TO BUY MORE.: NEVER TRUE

## 2022-08-09 SDOH — ECONOMIC STABILITY: INCOME INSECURITY: IN THE LAST 12 MONTHS, WAS THERE A TIME WHEN YOU WERE NOT ABLE TO PAY THE MORTGAGE OR RENT ON TIME?: NO

## 2022-08-09 SDOH — ECONOMIC STABILITY: FOOD INSECURITY: WITHIN THE PAST 12 MONTHS, THE FOOD YOU BOUGHT JUST DIDN'T LAST AND YOU DIDN'T HAVE MONEY TO GET MORE.: NEVER TRUE

## 2022-08-09 SDOH — ECONOMIC STABILITY: TRANSPORTATION INSECURITY
IN THE PAST 12 MONTHS, HAS THE LACK OF TRANSPORTATION KEPT YOU FROM MEDICAL APPOINTMENTS OR FROM GETTING MEDICATIONS?: NO

## 2022-08-09 ASSESSMENT — ENCOUNTER SYMPTOMS
WEAKNESS: 0
BREAST MASS: 0
PALPITATIONS: 0
HEADACHES: 0
SORE THROAT: 0
JOINT SWELLING: 0
DYSURIA: 0
HEMATURIA: 0
MYALGIAS: 0
CHILLS: 0
DIARRHEA: 0
DIZZINESS: 0
SHORTNESS OF BREATH: 0
PARESTHESIAS: 0
CONSTIPATION: 0
COUGH: 0
ARTHRALGIAS: 0
EYE PAIN: 1
HEARTBURN: 1
ABDOMINAL PAIN: 0
NERVOUS/ANXIOUS: 1
FREQUENCY: 0
HEMATOCHEZIA: 0
FEVER: 0
NAUSEA: 0

## 2022-08-09 ASSESSMENT — LIFESTYLE VARIABLES
HOW OFTEN DO YOU HAVE SIX OR MORE DRINKS ON ONE OCCASION: NEVER
SKIP TO QUESTIONS 9-10: 1
HOW MANY STANDARD DRINKS CONTAINING ALCOHOL DO YOU HAVE ON A TYPICAL DAY: 1 OR 2
HOW OFTEN DO YOU HAVE A DRINK CONTAINING ALCOHOL: MONTHLY OR LESS
AUDIT-C TOTAL SCORE: 1

## 2022-08-09 ASSESSMENT — SOCIAL DETERMINANTS OF HEALTH (SDOH)
HOW OFTEN DO YOU ATTEND CHURCH OR RELIGIOUS SERVICES?: 1 TO 4 TIMES PER YEAR
HOW OFTEN DO YOU GET TOGETHER WITH FRIENDS OR RELATIVES?: NEVER
IN A TYPICAL WEEK, HOW MANY TIMES DO YOU TALK ON THE PHONE WITH FAMILY, FRIENDS, OR NEIGHBORS?: MORE THAN THREE TIMES A WEEK
DO YOU BELONG TO ANY CLUBS OR ORGANIZATIONS SUCH AS CHURCH GROUPS UNIONS, FRATERNAL OR ATHLETIC GROUPS, OR SCHOOL GROUPS?: NO

## 2022-08-09 ASSESSMENT — PAIN SCALES - GENERAL: PAINLEVEL: NO PAIN (0)

## 2022-08-09 NOTE — LETTER
August 11, 2022      Mariah Mart  [address] [address] [facility] [address] [id number]        Dear ,    Your blood sugar is mildly elevated, as tested by the hgbA1C.  This measures average sugar over the past 3 mos.  5.7-6.4 is considered borderline elevation.  This is a pre-diabetic condition.  Eating healthily, exercising and maintaining a healthy weight can prevent the development of diabetes.  You should have your blood sugar checked yearly to monitor this.       Your electrolytes and kidney function are normal.     Your cholesterol is overall ok.  Your LDL is great.  Your HDL, good cholesterol, is lower.  Your trigylcerides are mildly elevated.  Dietary changes like drinking less alcohol, eating less sugar and processed foods and eating more good fats such as those in olive oil, canola oil, flaxseed and fish can help lower this.  This website has more information on reducing triglycerides: https://[email or web address].com/how_to_lower_triglycerides_naturally/article.htm   Medication adjustment is not needed at this time but we should recheck  next year.     Please let us know if you have any questions or concerns.  I hope you are able to find better help soon!         Jennifer Dickerson MD       Resulted Orders   Lipid panel reflex to direct LDL Non-fasting   Result Value Ref Range    Cholesterol 167 <200 mg/dL    Triglycerides 235 (H) <150 mg/dL    Direct Measure HDL 47 (L) >=50 mg/dL    LDL Cholesterol Calculated 73 <=100 mg/dL    Non HDL Cholesterol 120 <130 mg/dL    Patient Fasting > 8hrs? No     Narrative    Cholesterol  Desirable:  <200 mg/dL    Triglycerides  Normal:  Less than 150 mg/dL  Borderline High:  150-199 mg/dL  High:  200-499 mg/dL  Very High:  Greater than or equal to 500 mg/dL    Direct Measure HDL  Female:  Greater than or equal to 50 mg/dL   Male:  Greater than or equal to 40 mg/dL    LDL Cholesterol  Desirable:  <100mg/dL  Above Desirable:  100-129 mg/dL   Borderline High:  130-159  mg/dL   High:  160-189 mg/dL   Very High:  >= 190 mg/dL    Non HDL Cholesterol  Desirable:  130 mg/dL  Above Desirable:  130-159 mg/dL  Borderline High:  160-189 mg/dL  High:  190-219 mg/dL  Very High:  Greater than or equal to 220 mg/dL   BASIC METABOLIC PANEL   Result Value Ref Range    Sodium 139 133 - 144 mmol/L    Potassium 3.8 3.4 - 5.3 mmol/L    Chloride 107 94 - 109 mmol/L    Carbon Dioxide (CO2) 23 20 - 32 mmol/L    Anion Gap 9 3 - 14 mmol/L    Urea Nitrogen 9 7 - 30 mg/dL    Creatinine 0.64 0.52 - 1.04 mg/dL    Calcium 9.0 8.5 - 10.1 mg/dL    Glucose 109 (H) 70 - 99 mg/dL    GFR Estimate >90 >60 mL/min/1.73m2      Comment:      Effective December 21, 2021 eGFRcr in adults is calculated using the 2021 CKD-EPI creatinine equation which includes age and gender (Andres jackson al., NEJM, DOI: 10.1056/IKYIul3428313)   Hemoglobin A1c   Result Value Ref Range    Hemoglobin A1C 5.9 (H) 0.0 - 5.6 %      Comment:      Normal <5.7%   Prediabetes 5.7-6.4%    Diabetes 6.5% or higher     Note: Adopted from ADA consensus guidelines.
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
## 2022-08-09 NOTE — PATIENT INSTRUCTIONS
Preventive Health Recommendations  Female Ages 50 - 64    Yearly exam: See your health care provider every year in order to  Review health changes.   Discuss preventive care.    Review your medicines if your doctor has prescribed any.    If you get Pap tests with HPV test, you only need to test every 5 years, unless you have an abnormal result.     Have a mammogram every 1 to 2 years.  Have a colonoscopy at age 50, or have a yearly FIT test (stool test). These exams screen for colon cancer.    Have a cholesterol test every 5 years, or more often if advised.  Have a diabetes test (fasting glucose) every three years. If you are at risk for diabetes, you should have this test more often.       Shots: Get a flu shot each year. Get a tetanus shot every 10 years.  Get the covid booster in September when it comes out.    Nutrition:   Eat at least 5 servings of fruits and vegetables each day.  Eat whole-grain bread, whole-wheat pasta and brown rice instead of white grains and rice.  Get adequate Calcium and Vitamin D.     Lifestyle  Exercise at least 150 minutes a week (30 minutes a day, 5 days a week). This will help you control your weight and prevent disease.  Limit alcohol to one drink per day.  No smoking.   Wear sunscreen to prevent skin cancer.   See your dentist every six months for an exam and cleaning.  See your eye doctor every 1 to 2 years.

## 2022-08-10 LAB
ANION GAP SERPL CALCULATED.3IONS-SCNC: 9 MMOL/L (ref 3–14)
BUN SERPL-MCNC: 9 MG/DL (ref 7–30)
CALCIUM SERPL-MCNC: 9 MG/DL (ref 8.5–10.1)
CHLORIDE BLD-SCNC: 107 MMOL/L (ref 94–109)
CHOLEST SERPL-MCNC: 167 MG/DL
CO2 SERPL-SCNC: 23 MMOL/L (ref 20–32)
CREAT SERPL-MCNC: 0.64 MG/DL (ref 0.52–1.04)
FASTING STATUS PATIENT QL REPORTED: NO
GFR SERPL CREATININE-BSD FRML MDRD: >90 ML/MIN/1.73M2
GLUCOSE BLD-MCNC: 109 MG/DL (ref 70–99)
HDLC SERPL-MCNC: 47 MG/DL
LDLC SERPL CALC-MCNC: 73 MG/DL
NONHDLC SERPL-MCNC: 120 MG/DL
POTASSIUM BLD-SCNC: 3.8 MMOL/L (ref 3.4–5.3)
SODIUM SERPL-SCNC: 139 MMOL/L (ref 133–144)
TRIGL SERPL-MCNC: 235 MG/DL

## 2022-09-12 NOTE — CONSULTS
[facility]    Neurosurgery Consultation     Date of Admission:  6/29/2018  Date of Consult (When I saw the patient): 06/29/18    Assessment & Plan   Mariah Mart is a 57 year old female who was admitted on 6/29/2018. I was asked to see the patient for left frontal skull fracture.    Active Problems:    Skull fracture (H)    Assessment: Non surgical. Monitor.  Complete prescribed course of antibiotics.    Plan: Repeat head CT in 2 weeks, f/u with [facility].       I have discussed the following assessment and plan with Dr. Rachid Lewis who is in agreement with the initial plan and will follow up with further consultation recommendations.    Jose Laura PA-C  [facility]  [place] [address]    Tel [phone]  Pager [phone]        Code Status    Full Code    Reason for Consult   Reason for consult: Skull fracture    Primary Care Physician   Jennifer Dickerson    Chief Complaint   Skull fracture    History is obtained from the patient and electronic health record    History of Present Illness   Mariah Mart is a 57 year old female who presents with a left frontal skull fracture.  She had fallen down about 3 steps last night and struck her head.  She was brought into the emergency department at [facility], where head CT revealed a nondisplaced skull fracture in the left frontal bone, extending to the superior orbit.  She was transferred to [facility] for observation.  She is on Unasyn.  She is currently awake, alert and oriented.  She denies any headache.  She denies any visual changes or any balance or dizziness issues.  She states that she has appointments for her children this morning, and she also has nurses at home.    Past Medical History   I have reviewed this patient's medical history and updated it with pertinent information if needed.   Past Medical History:   Diagnosis Date      This 58 [address] Chapa spoke with pt and pt stated that he is having some mild to moderate chest pain and SOB not exacerbated by exertion. Pt took a Nitroglycerin tab and will monitor. Will report to the ED if chest pain and SOB persists. Denied any other worsening symptoms. Denied fever, chills, N/V at this time. Denied difficulty with urination, BMs or appetite. Pt taking all meds as prescribed. Pt has HFU on 22 with Cardio. Pt is with ex wife as she is assisting pt until pt feels stable enough to reside alone. Writer routed message to Cardio to advise of symptoms. Advised pt to immediately report any worsening symptoms to the PCP. Patient verbalized understanding and agreed. Arnoldo Tafoya LPN, 58 [address] Chapa  PH: [address] Transitions Initial Follow Up Call    Call within 2 business days of discharge: Yes    Patient: Gris Villa Patient : 1953   MRN: [id number]  Reason for Admission: Cardiac Cath  Discharge Date: 9/10/22 RARS: Readmission Risk Score: 12.8      Last Discharge [facility]       Date Complaint Diagnosis Description Type Department Provider    22  Progressive angina ([address] . .. [facility]             Spoke with: Richie 21: [facility]    Non-face-to-face services provided:  Obtained and reviewed discharge summary and/or continuity of care documents  Communication with specialists who will assume or re-assume care of the patient's system-specific problems-Chest pain  Transitions of Care Initial Call    Was this an external facility discharge? No Discharge Facility: [facility]    Challenges to be reviewed by the provider   Additional needs identified to be addressed with provider: Yes  none  This pt was discharged from [facility] on 22, s/p cardiac cath. Pt c/o mild to moderate chest pain that is intermittent and not exacerbated by exertion. Pt also c/o some SOB, not severe.  Pt advised to go to the ED with persistent and worsening Essential hypertension, benign      Hypopotassemia     on diuretics     Internal hemorrhoids     ligated     Mixed hyperlipidemia      Obesity 12/28/2010       Past Surgical History   I have reviewed this patient's surgical history and updated it with pertinent information if needed.  Past Surgical History:   Procedure Laterality Date     LAPAROSCOPIC APPENDECTOMY N/A 1/10/2018    Procedure: LAPAROSCOPIC APPENDECTOMY;  LAPAROSCOPIC APPENDECTOMY ;  Surgeon: Eugenia De La Rosa MD;  Location: [facility] OR     TONSILLECTOMY & ADENOIDECTOMY         Prior to Admission Medications   Prior to Admission Medications   Prescriptions Last Dose Informant Patient Reported? Taking?   ALPRAZolam (XANAX) 0.25 MG tablet  Self No Yes   Sig: Take 1 tablet (0.25 mg) by mouth 3 times daily as needed for anxiety   Boswellia-Glucosamine-Vit D (GLUCOSAMINE COMPLEX PO)  Self Yes Yes   Sig: Take 2 tablets by mouth At Bedtime    Calcium-Magnesium-Vitamin D 500-250-200 MG-MG-UNIT TABS  Self Yes Yes   Sig: Take 2 tablets by mouth At Bedtime   DULoxetine (CYMBALTA) 60 MG EC capsule  at pm Self No Yes   Sig: Take 1 capsule (60 mg) by mouth daily   LANsoprazole (PREVACID) 30 MG CR capsule  Self Yes Yes   Sig: Take 30 mg by mouth At Bedtime   MULTIVITAMIN TABS   OR  at pm Self No Yes   Patient taking differently: 1 tab at HS   amLODIPine (NORVASC) 5 MG tablet  at pm Self No Yes   Sig: Take 1 tablet (5 mg) by mouth daily   busPIRone (BUSPAR) 10 MG tablet  Self No Yes   Sig: TAKE ONE TABLET BY MOUTH TWICE DAILY    lisinopril (PRINIVIL/ZESTRIL) 40 MG tablet  at pm Self No Yes   Sig: Take 1 tablet (40 mg) by mouth daily   naproxen (NAPROSYN) 500 MG tablet  Self No Yes   Sig: Take 1 tablet (500 mg) by mouth 2 times daily as needed for moderate pain   orphenadrine (NORFLEX) 100 MG 12 hr tablet  Self Yes Yes   Sig: Take 100 mg by mouth 2 times daily as needed for muscle spasms   simvastatin (ZOCOR) 40 MG tablet  Self No Yes   Sig: Take 1 tablet (40 mg) by  chest pain and SOB, please advise, thank you. Method of communication with provider : chart routing    Advance Care Planning:   Does patient have an Advance Directive: reviewed and current. LPN Care Coordinator contacted the patient by telephone to perform post hospital discharge assessment. Verified name and  with patient as identifiers. Provided introduction to self, and explanation of the LPN CC role. LPN CC reviewed discharge instructions, medical action plan and red flags with patient who verbalized understanding. Patient given an opportunity to ask questions and does not have any further questions or concerns at this time. Were discharge instructions available to patient? Yes. Reviewed appropriate site of care based on symptoms and resources available to patient including: PCP  Specialist  Urgent care clinics  When to call [address] The patient agrees to contact the PCP office for questions related to their healthcare. Medication reconciliation was performed with patient, who verbalizes understanding of administration of home medications. Advised obtaining a 90-day supply of all daily and as-needed medications. Was patient discharged with a pulse oximeter? no    LPN CC provided contact information. Plan for follow-up call in 3-5 days based on severity of symptoms and risk factors.   Plan for next call: symptom management-Chest pain        Care Transitions 24 Hour Call    Schedule Follow Up Appointment with PCP: Completed  Do you have a copy of your discharge instructions?: Yes  Do you have all of your prescriptions and are they filled?: Yes  Have you been contacted by a [id number] [employer] ePrint Pharmacist?: No  Have you scheduled your follow up appointment?: Yes  How are you going to get to your appointment?: Car - family or friend to transport  Patient DME: Straight cane  Do you have support at home?: Alone  Do you feel like you have everything you need to keep you well at home?: Yes  Are you an active caregiver in your home?: No  Care Transitions Interventions  No Identified Needs         Follow Up  Future Appointments   Date Time Provider Vikki Caal   9/28/2022  9:00 AM SCHEDULE, [facility] RM 1 [facility] [place] [facility]   9/30/2022 10:00 AM ISIDRA Cates - CNP FF Cardio [facility]   10/17/2022  8:00 AM SCHEDULE, [place] REMOTE TRANSMISSION FF Cardio [facility]   10/18/2022  8:45 AM Obinna Guajardo MD PULM & CC [facility]   11/21/2022  8:00 AM SCHEDULE, [place] REMOTE TRANSMISSION FF Cardio MMA   12/26/2022 11:00 AM SCHEDULE, [place] REMOTE TRANSMISSION FF Cardio MMA   1/30/2023 11:00 AM SCHEDULE, [place] REMOTE TRANSMISSION FF Cardio MMA   3/6/2023 11:00 AM SCHEDULE, [place] REMOTE TRANSMISSION FF Cardio MMA   4/10/2023 11:00 AM SCHEDULE, [place] REMOTE TRANSMISSION FF Cardio MMA   5/15/2023 11:00 AM SCHEDULE, [place] REMOTE TRANSMISSION FF Cardio MMA   6/19/2023 11:00 AM SCHEDULE, [place] REMOTE TRANSMISSION FF Cardio MMA   7/24/2023 11:00 AM SCHEDULE, [place] REMOTE TRANSMISSION FF Cardio MMA   8/28/2023 11:00 AM SCHEDULE, [place] REMOTE TRANSMISSION FF Cardio MMA   10/2/2023 11:00 AM SCHEDULE, [place] REMOTE TRANSMISSION FF Cardio MMA   11/6/2023 11:00 AM SCHEDULE, [place] REMOTE TRANSMISSION FF Cardio MMA   12/11/2023 11:00 AM SCHEDULE, [place] REMOTE TRANSMISSION FF Cardio MMA       Marino Bautista LPN mouth At Bedtime      Facility-Administered Medications: None     Allergies   Allergies   Allergen Reactions     Gabapentin      Pt feels out of it       Social History   I have reviewed this patient's social history and updated it with pertinent information if needed. Mariah Mart  reports that she has never smoked. She has never used smokeless tobacco. She reports that she drinks alcohol. She reports that she does not use illicit drugs.    Family History   I have reviewed this patient's family history and updated it with pertinent information if needed.   Family History   Problem Relation Age of Onset     Hypertension Mother      HEART DISEASE Mother      MI  at 52     HEART DISEASE Father      : CABG     Respiratory Father      COPD     Hypertension Father      Hypertension Brother      Genitourinary Problems Sister      interstital cystitis     Breast Cancer No family hx of      Cancer - colorectal No family hx of      Diabetes No family hx of        Review of Systems   Comprehensive ROS is negative with the exception of the HPI and past medical history  Physical Exam   Temp: 97.8  F (36.6  C) Temp src: Oral BP: 117/71   Heart Rate: 76 Resp: 16 SpO2: 99 % O2 Device: Nasal cannula Oxygen Delivery: 2 LPM  Vital Signs with Ranges  Temp:  [97.8  F (36.6  C)-98.1  F (36.7  C)] 97.8  F (36.6  C)  Pulse:  [88] 88  Heart Rate:  [76-93] 76  Resp:  [16-18] 16  BP: (117-142)/(71-81) 117/71  SpO2:  [95 %-100 %] 99 %  0 lbs 0 oz    Heart Rate: 76, Blood pressure 117/71, temperature 97.8  F (36.6  C), temperature source Oral, resp. rate 16, last menstrual period 2007, SpO2 99 %.  0 lbs 0 oz  HEENT:  Normocephalic, atraumatic.  PERRLA.  EOM s intact.   Neck:  Supple, non-tender, without lymphadenopathy.  Heart:  No peripheral edema  Lungs:  No SOB  Abdomen:  Soft, non-tender, non-distended.  Skin:  Warm and dry, good capillary refill.  Extremities:  Good radial and dorsalis pedis pulses bilaterally,  no edema, cyanosis or clubbing.    NEUROLOGICAL EXAMINATION:     Mental status:  Alert and Oriented x 3, speech is fluent.  Cranial nerves:  II-XII intact.   Motor:  Strength is 5/5 throughout the upper and lower extremities  Sensation:  intact  Reflexes:   Negative Babinski.  Negative Clonus.    Coordination:  Smooth finger to nose testing.   Negative pronator drift.     Data   All new lab and imaging data was personally reviewed by me.  CT:IMPRESSION:  1. Nondisplaced left frontal bone fracture. This extends through the  left frontal sinus and into the left superior orbital rim, at risk for  infection.  2. No intracranial hemorrhage. No other acute findings.    CBC RESULTS:   Recent Labs   Lab Test  06/28/18   2245   WBC  7.3   RBC  4.00   HGB  12.4   HCT  38.7   MCV  97   MCH  31.0   MCHC  32.0   RDW  13.4   PLT  365     Basic Metabolic Panel:  Lab Results   Component Value Date     06/28/2018      Lab Results   Component Value Date    POTASSIUM 3.5 06/28/2018     Lab Results   Component Value Date    CHLORIDE 105 06/28/2018     Lab Results   Component Value Date    CECILIA 8.7 06/28/2018     Lab Results   Component Value Date    CO2 29 06/28/2018     Lab Results   Component Value Date    BUN 8 06/28/2018     Lab Results   Component Value Date    CR 0.63 06/28/2018     Lab Results   Component Value Date     06/28/2018     INR:  No results found for: INR

## 2022-09-19 ENCOUNTER — ANCILLARY PROCEDURE (OUTPATIENT)
Dept: MAMMOGRAPHY | Facility: CLINIC | Age: 62
End: 2022-09-19
Attending: INTERNAL MEDICINE
Payer: COMMERCIAL

## 2022-09-19 DIAGNOSIS — Z12.31 VISIT FOR SCREENING MAMMOGRAM: ICD-10-CM

## 2022-09-19 PROCEDURE — 77063 BREAST TOMOSYNTHESIS BI: CPT | Mod: TC | Performed by: RADIOLOGY

## 2022-09-19 PROCEDURE — 77067 SCR MAMMO BI INCL CAD: CPT | Mod: TC | Performed by: RADIOLOGY

## 2023-05-16 NOTE — TELEPHONE ENCOUNTER
"Requested Prescriptions   Pending Prescriptions Disp Refills     busPIRone (BUSPAR) 10 MG tablet      Last Written Prescription Date:  5/23/2018  Last Fill Quantity: 60,  # refills: 8   Last office visit: 9/11/2018 with prescribing provider:  Jennifer Dickerson     Future Office Visit:     60 tablet 8     Sig: Take 1 tablet (10 mg) by mouth 2 times daily    Atypical Antidepressants Protocol Passed - 2/13/2019  3:43 PM       Passed - Recent (12 mo) or future (30 days) visit within the authorizing provider's specialty    Patient had office visit in the last 12 months or has a visit in the next 30 days with authorizing provider or within the authorizing provider's specialty.  See \"Patient Info\" tab in inbasket, or \"Choose Columns\" in Meds & Orders section of the refill encounter.             Passed - Medication active on med list       Passed - Patient is age 18 or older       Passed - No active pregnancy on record       Passed - No positive pregnancy test in past 12 mos          "
Prescription approved per [facility], [facility] or [facility] refill protocol.  Briana SHORT RN - Triage  [facility]      
Chronic atrial fibrillation

## 2023-07-10 ENCOUNTER — PATIENT OUTREACH (OUTPATIENT)
Dept: CARE COORDINATION | Facility: CLINIC | Age: 63
End: 2023-07-10
Payer: COMMERCIAL

## 2023-07-24 ENCOUNTER — PATIENT OUTREACH (OUTPATIENT)
Dept: CARE COORDINATION | Facility: CLINIC | Age: 63
End: 2023-07-24
Payer: COMMERCIAL

## 2023-08-21 ENCOUNTER — PATIENT OUTREACH (OUTPATIENT)
Dept: CARE COORDINATION | Facility: CLINIC | Age: 63
End: 2023-08-21
Payer: COMMERCIAL

## 2023-08-28 ENCOUNTER — TELEPHONE (OUTPATIENT)
Dept: PEDIATRICS | Facility: CLINIC | Age: 63
End: 2023-08-28

## 2023-08-28 NOTE — TELEPHONE ENCOUNTER
Reason for Call:  Appointment Request    Patient requesting this type of appt:  Preventive     Requested provider:  open to providers in  [place] or [place]     Reason patient unable to be scheduled: Not within requested timeframe    When does patient want to be seen/preferred time: 1-2 weeks    Comments: Patient was told that she needs to be seen to get a refill on medication - she will be out of medication around the next 1-2 weeks     Okay to leave a detailed message?: Yes at Cell number on file:    Telephone Information:   Mobile [phone]       Call taken on 8/28/2023 at 2:45 PM by Elaine Lugo

## 2023-08-29 ENCOUNTER — TELEPHONE (OUTPATIENT)
Dept: PEDIATRICS | Facility: CLINIC | Age: 63
End: 2023-08-29
Payer: COMMERCIAL

## 2023-08-29 NOTE — TELEPHONE ENCOUNTER
Outgoing call spoke to patient.    Phone was cutting in and out.    If patient call's back please have her speak to [facility].    Thank you  Juan SALAZAR

## 2023-08-29 NOTE — TELEPHONE ENCOUNTER
Reason for Call:  Appointment Request    Patient requesting this type of appt:  Annual/Wellness     Requested provider: Jennifer Dickerson    Reason patient unable to be scheduled:  Patient was contacted by [facility] Care Team to schedule and the phone call got disconnected.    When does patient want to be seen/preferred time:  September    Comments: Patient said that the care team that called her had an appt opening in September, the phone call got disconnected and she never received a phone call back. She said it was Doctor Jayla's nurses calling her and when she said she wanted the appointment they offered her in September the call cut off and the appointment never got scheduled and she never received a call back. Central Scheduling schedule says Jayla is booked out until November. Please call patient back to schedule. Thank you.    Okay to leave a detailed message?: Yes at Cell number on file:    Telephone Information:   Mobile [phone]       Call taken on 8/29/2023 at 1:57 PM by Charlene Lomeli

## 2023-08-30 NOTE — TELEPHONE ENCOUNTER
Outgoing call to patient. No VM came on to leave message. Waiting for patient to callback.    Thank you  Juan SALAZAR

## 2023-09-18 ENCOUNTER — PATIENT OUTREACH (OUTPATIENT)
Dept: CARE COORDINATION | Facility: CLINIC | Age: 63
End: 2023-09-18
Payer: COMMERCIAL

## 2023-09-19 ENCOUNTER — LAB (OUTPATIENT)
Dept: PEDIATRICS | Facility: CLINIC | Age: 63
End: 2023-09-19

## 2023-09-19 ENCOUNTER — OFFICE VISIT (OUTPATIENT)
Dept: PEDIATRICS | Facility: CLINIC | Age: 63
End: 2023-09-19
Payer: COMMERCIAL

## 2023-09-19 VITALS
BODY MASS INDEX: 32.48 KG/M2 | WEIGHT: 176.5 LBS | DIASTOLIC BLOOD PRESSURE: 66 MMHG | HEIGHT: 62 IN | OXYGEN SATURATION: 100 % | RESPIRATION RATE: 20 BRPM | TEMPERATURE: 98.8 F | HEART RATE: 79 BPM | SYSTOLIC BLOOD PRESSURE: 120 MMHG

## 2023-09-19 DIAGNOSIS — R73.01 IMPAIRED FASTING GLUCOSE: ICD-10-CM

## 2023-09-19 DIAGNOSIS — E78.5 HYPERLIPIDEMIA LDL GOAL <160: ICD-10-CM

## 2023-09-19 DIAGNOSIS — Z12.11 SCREEN FOR COLON CANCER: ICD-10-CM

## 2023-09-19 DIAGNOSIS — E66.811 CLASS 1 OBESITY DUE TO EXCESS CALORIES WITH SERIOUS COMORBIDITY AND BODY MASS INDEX (BMI) OF 31.0 TO 31.9 IN ADULT: ICD-10-CM

## 2023-09-19 DIAGNOSIS — I10 ESSENTIAL HYPERTENSION, BENIGN: ICD-10-CM

## 2023-09-19 DIAGNOSIS — G44.89 OTHER HEADACHE SYNDROME: ICD-10-CM

## 2023-09-19 DIAGNOSIS — Z00.00 ROUTINE GENERAL MEDICAL EXAMINATION AT A HEALTH CARE FACILITY: Primary | ICD-10-CM

## 2023-09-19 DIAGNOSIS — F41.0 PANIC DISORDER WITHOUT AGORAPHOBIA: ICD-10-CM

## 2023-09-19 DIAGNOSIS — E66.09 CLASS 1 OBESITY DUE TO EXCESS CALORIES WITH SERIOUS COMORBIDITY AND BODY MASS INDEX (BMI) OF 31.0 TO 31.9 IN ADULT: ICD-10-CM

## 2023-09-19 DIAGNOSIS — Z12.31 VISIT FOR SCREENING MAMMOGRAM: ICD-10-CM

## 2023-09-19 LAB
ALBUMIN SERPL BCG-MCNC: 4.3 G/DL (ref 3.5–5.2)
ALP SERPL-CCNC: 100 U/L (ref 35–104)
ALT SERPL W P-5'-P-CCNC: 19 U/L (ref 0–50)
ANION GAP SERPL CALCULATED.3IONS-SCNC: 11 MMOL/L (ref 7–15)
AST SERPL W P-5'-P-CCNC: 19 U/L (ref 0–45)
BILIRUB SERPL-MCNC: 0.2 MG/DL
BUN SERPL-MCNC: 7 MG/DL (ref 8–23)
CALCIUM SERPL-MCNC: 9.7 MG/DL (ref 8.8–10.2)
CHLORIDE SERPL-SCNC: 100 MMOL/L (ref 98–107)
CHOLEST SERPL-MCNC: 169 MG/DL
CREAT SERPL-MCNC: 0.68 MG/DL (ref 0.51–0.95)
DEPRECATED HCO3 PLAS-SCNC: 28 MMOL/L (ref 22–29)
EGFRCR SERPLBLD CKD-EPI 2021: >90 ML/MIN/1.73M2
GLUCOSE SERPL-MCNC: 78 MG/DL (ref 70–99)
HBA1C MFR BLD: 5.7 % (ref 0–5.6)
HDLC SERPL-MCNC: 50 MG/DL
LDLC SERPL CALC-MCNC: 84 MG/DL
NONHDLC SERPL-MCNC: 119 MG/DL
POTASSIUM SERPL-SCNC: 4.2 MMOL/L (ref 3.4–5.3)
PROT SERPL-MCNC: 6.9 G/DL (ref 6.4–8.3)
SODIUM SERPL-SCNC: 139 MMOL/L (ref 136–145)
TRIGL SERPL-MCNC: 177 MG/DL

## 2023-09-19 PROCEDURE — 90682 RIV4 VACC RECOMBINANT DNA IM: CPT | Performed by: INTERNAL MEDICINE

## 2023-09-19 PROCEDURE — 90471 IMMUNIZATION ADMIN: CPT | Performed by: INTERNAL MEDICINE

## 2023-09-19 PROCEDURE — 83036 HEMOGLOBIN GLYCOSYLATED A1C: CPT | Performed by: INTERNAL MEDICINE

## 2023-09-19 PROCEDURE — 80053 COMPREHEN METABOLIC PANEL: CPT | Performed by: INTERNAL MEDICINE

## 2023-09-19 PROCEDURE — 80061 LIPID PANEL: CPT | Performed by: INTERNAL MEDICINE

## 2023-09-19 PROCEDURE — 36415 COLL VENOUS BLD VENIPUNCTURE: CPT | Performed by: INTERNAL MEDICINE

## 2023-09-19 PROCEDURE — 99396 PREV VISIT EST AGE 40-64: CPT | Mod: 25 | Performed by: INTERNAL MEDICINE

## 2023-09-19 RX ORDER — DULOXETIN HYDROCHLORIDE 60 MG/1
60 CAPSULE, DELAYED RELEASE ORAL DAILY
Qty: 90 CAPSULE | Refills: 4 | Status: SHIPPED | OUTPATIENT
Start: 2023-09-19 | End: 2024-09-17

## 2023-09-19 RX ORDER — LISINOPRIL 40 MG/1
40 TABLET ORAL DAILY
Qty: 90 TABLET | Refills: 3 | Status: SHIPPED | OUTPATIENT
Start: 2023-09-19

## 2023-09-19 RX ORDER — BUSPIRONE HYDROCHLORIDE 15 MG/1
15 TABLET ORAL 2 TIMES DAILY
Qty: 180 TABLET | Refills: 4 | Status: SHIPPED | OUTPATIENT
Start: 2023-09-19

## 2023-09-19 RX ORDER — AMLODIPINE BESYLATE 5 MG/1
5 TABLET ORAL DAILY
Qty: 90 TABLET | Refills: 3 | Status: SHIPPED | OUTPATIENT
Start: 2023-09-19

## 2023-09-19 RX ORDER — ATORVASTATIN CALCIUM 20 MG/1
20 TABLET, FILM COATED ORAL DAILY
Qty: 90 TABLET | Refills: 4 | Status: SHIPPED | OUTPATIENT
Start: 2023-09-19

## 2023-09-19 SDOH — ECONOMIC STABILITY: INCOME INSECURITY: IN THE LAST 12 MONTHS, WAS THERE A TIME WHEN YOU WERE NOT ABLE TO PAY THE MORTGAGE OR RENT ON TIME?: NO

## 2023-09-19 SDOH — ECONOMIC STABILITY: FOOD INSECURITY: WITHIN THE PAST 12 MONTHS, THE FOOD YOU BOUGHT JUST DIDN'T LAST AND YOU DIDN'T HAVE MONEY TO GET MORE.: NEVER TRUE

## 2023-09-19 SDOH — ECONOMIC STABILITY: FOOD INSECURITY: WITHIN THE PAST 12 MONTHS, YOU WORRIED THAT YOUR FOOD WOULD RUN OUT BEFORE YOU GOT MONEY TO BUY MORE.: NEVER TRUE

## 2023-09-19 SDOH — HEALTH STABILITY: PHYSICAL HEALTH: ON AVERAGE, HOW MANY DAYS PER WEEK DO YOU ENGAGE IN MODERATE TO STRENUOUS EXERCISE (LIKE A BRISK WALK)?: 1 DAY

## 2023-09-19 SDOH — HEALTH STABILITY: PHYSICAL HEALTH: ON AVERAGE, HOW MANY MINUTES DO YOU ENGAGE IN EXERCISE AT THIS LEVEL?: 20 MIN

## 2023-09-19 SDOH — ECONOMIC STABILITY: INCOME INSECURITY: HOW HARD IS IT FOR YOU TO PAY FOR THE VERY BASICS LIKE FOOD, HOUSING, MEDICAL CARE, AND HEATING?: NOT HARD AT ALL

## 2023-09-19 ASSESSMENT — LIFESTYLE VARIABLES
HOW MANY STANDARD DRINKS CONTAINING ALCOHOL DO YOU HAVE ON A TYPICAL DAY: 3 OR 4
HOW OFTEN DO YOU HAVE SIX OR MORE DRINKS ON ONE OCCASION: NEVER
SKIP TO QUESTIONS 9-10: 0
AUDIT-C TOTAL SCORE: 2
HOW OFTEN DO YOU HAVE A DRINK CONTAINING ALCOHOL: MONTHLY OR LESS

## 2023-09-19 ASSESSMENT — ENCOUNTER SYMPTOMS
ABDOMINAL PAIN: 0
CHILLS: 0
HEMATURIA: 0
HEMATOCHEZIA: 0
WEAKNESS: 0
EYE PAIN: 0
ARTHRALGIAS: 0
HEADACHES: 0
BREAST MASS: 0
NERVOUS/ANXIOUS: 1
DIARRHEA: 0
DIZZINESS: 0
FEVER: 0
PALPITATIONS: 0
COUGH: 0
DYSURIA: 0
NAUSEA: 0
PARESTHESIAS: 0
JOINT SWELLING: 0
HEARTBURN: 0
FREQUENCY: 0
SORE THROAT: 0
MYALGIAS: 1
SHORTNESS OF BREATH: 0
CONSTIPATION: 0

## 2023-09-19 ASSESSMENT — SOCIAL DETERMINANTS OF HEALTH (SDOH)
DO YOU BELONG TO ANY CLUBS OR ORGANIZATIONS SUCH AS CHURCH GROUPS UNIONS, FRATERNAL OR ATHLETIC GROUPS, OR SCHOOL GROUPS?: NO
HOW OFTEN DO YOU ATTEND CHURCH OR RELIGIOUS SERVICES?: 1 TO 4 TIMES PER YEAR
IN A TYPICAL WEEK, HOW MANY TIMES DO YOU TALK ON THE PHONE WITH FAMILY, FRIENDS, OR NEIGHBORS?: MORE THAN THREE TIMES A WEEK
HOW OFTEN DO YOU GET TOGETHER WITH FRIENDS OR RELATIVES?: ONCE A WEEK

## 2023-09-19 ASSESSMENT — PAIN SCALES - GENERAL: PAINLEVEL: NO PAIN (0)

## 2023-09-19 NOTE — PATIENT INSTRUCTIONS
Preventive Health Recommendations  Female Ages 50 - 64    Yearly exam: See your health care provider every year in order to  Review health changes.   Discuss preventive care.    Review your medicines if your doctor has prescribed any.    You do not need a pap anymore  Have a mammogram every 1 to 2 years - you are due.  Have a colonoscopy this year  Have a cholesterol test every 5 years, or more often if advised.  Have a diabetes test (fasting glucose) every three years. If you are at risk for diabetes, you should have this test more often.   If you are at risk for osteoporosis (brittle bone disease), think about having a bone density scan (DEXA).    Shots: Get a flu shot each year. Get a tetanus shot every 10 years - you are due in 2026  I recommend the updated covid vaccine this fall.  RSV probably not.    Nutrition:   Eat at least 5 servings of fruits and vegetables each day.  Eat whole-grain bread, whole-wheat pasta and brown rice instead of white grains and rice.  Get adequate Calcium and Vitamin D.     Lifestyle  Exercise at least 150 minutes a week (30 minutes a day, 5 days a week). This will help you control your weight and prevent disease.  Limit alcohol to one drink per day.  No smoking.   Wear sunscreen to prevent skin cancer.   See your dentist every six months for an exam and cleaning.  See your eye doctor every 1 to 2 years.

## 2023-09-19 NOTE — PROGRESS NOTES
SUBJECTIVE:   CC: Mariah is an 63 year old who presents for preventive health visit.       9/19/2023     1:11 PM   Additional Questions   Roomed by Carlyle Shukla   Accompanied by N/A         9/19/2023     1:11 PM   Patient Reported Additional Medications   Patient reports taking the following new medications No       Healthy Habits:     Getting at least 3 servings of Calcium per day:  Yes    Bi-annual eye exam:  Yes    Dental care twice a year:  NO    Sleep apnea or symptoms of sleep apnea:  None    Diet:  Carbohydrate counting    Frequency of exercise:  2-3 days/week    Duration of exercise:  15-30 minutes    Taking medications regularly:  Yes    Medication side effects:  None    Additional concerns today:  No  Weight concerns -   Wt Readings from Last 10 Encounters:   09/19/23 80.1 kg (176 lb 8 oz)   08/09/22 81.1 kg (178 lb 14.4 oz)   06/18/21 77.3 kg (170 lb 8 oz)   06/01/20 72.8 kg (160 lb 9.6 oz)   05/13/20 74.3 kg (163 lb 12.8 oz)   04/15/20 75.3 kg (166 lb)   03/12/19 72.2 kg (159 lb 1.6 oz)   09/11/18 76 kg (167 lb 8 oz)   07/13/18 77.6 kg (171 lb)   07/10/18 77.6 kg (171 lb)      Mood - coping better.  Toughs it out.  Staffing better,      6/16/2009     2:30 PM 11/29/2016     3:43 PM 2/2/2018     4:01 PM   PHQ-9 SCORE   PHQ-9 Total Score 1     PHQ-9 Total Score  8 8      Today's PHQ-2 Score:       9/19/2023     1:01 PM   PHQ-2 ( 1999 [employer])   Q1: Little interest or pleasure in doing things 1   Q2: Feeling down, depressed or hopeless 1   PHQ-2 Score 2   Q1: Little interest or pleasure in doing things Several days   Q2: Feeling down, depressed or hopeless Several days   PHQ-2 Score 2       Social History     Tobacco Use    Smoking status: Never    Smokeless tobacco: Never   Substance Use Topics    Alcohol use: Yes     Comment: 1 mixed drink per month             9/19/2023     1:01 PM   Alcohol Use   Prescreen: >3 drinks/day or >7 drinks/week? No     Reviewed orders with patient.  Reviewed health  maintenance and updated orders accordingly - Yes  Labs reviewed in EPIC    Breast Cancer Screenin/18/2021     8:14 AM   Breast CA Risk Assessment (FHS-7)   Do you have a family history of breast, colon, or ovarian cancer? No / Unknown       Mammogram Screening: Recommended mammography every 1-2 years with patient discussion and risk factor consideration  Pertinent mammograms are reviewed under the imaging tab.    History of abnormal Pap smear: NO - age 30-65 PAP every 5 years with negative HPV co-testing recommended      Latest Ref Rng & Units 2021     9:50 AM 2021     9:37 AM 2015    10:00 AM   PAP / HPV   PAP (Historical)   NIL     HPV 16 DNA NEG^Negative Negative   Negative    HPV 18 DNA NEG^Negative Negative   Negative    Other HR HPV NEG^Negative Negative   Negative      Reviewed and updated as needed this visit by clinical staff   Tobacco  Allergies  Meds  Problems    Fam Hx          Reviewed and updated as needed this visit by Provider     Meds  Problems    Fam Hx             Review of Systems   Constitutional:  Negative for chills and fever.   HENT:  Negative for congestion, ear pain, hearing loss and sore throat.    Eyes:  Negative for pain and visual disturbance.   Respiratory:  Negative for cough and shortness of breath.    Cardiovascular:  Negative for chest pain, palpitations and peripheral edema.   Gastrointestinal:  Negative for abdominal pain, constipation, diarrhea, heartburn, hematochezia and nausea.   Breasts:  Negative for tenderness, breast mass and discharge.   Genitourinary:  Negative for dysuria, frequency, genital sores, hematuria, pelvic pain, urgency, vaginal bleeding and vaginal discharge.   Musculoskeletal:  Positive for myalgias. Negative for arthralgias and joint swelling.   Skin:  Negative for rash.   Neurological:  Negative for dizziness, weakness, headaches and paresthesias.   Psychiatric/Behavioral:  Negative for mood changes. The patient is  "nervous/anxious.         OBJECTIVE:   /66 (BP Location: Right arm, Patient Position: Sitting, Cuff Size: Adult Regular)   Pulse 79   Temp 98.8  F (37.1  C) (Tympanic)   Resp 20   Ht 1.584 m (5' 2.36\")   Wt 80.1 kg (176 lb 8 oz)   LMP 05/30/2007   SpO2 100%   BMI 31.91 kg/m    Physical Exam  GENERAL: healthy, alert and no distress  EYES: Eyes grossly normal to inspection, PERRL and conjunctivae and sclerae normal  HENT: ear canals and TM's normal, nose and mouth without ulcers or lesions  NECK: no adenopathy, no asymmetry, masses, or scars and thyroid normal to palpation  RESP: lungs clear to auscultation - no rales, rhonchi or wheezes  CV: regular rate and rhythm, normal S1 S2, no S3 or S4, no murmur, click or rub, no peripheral edema and peripheral pulses strong  ABDOMEN: soft, nontender, no hepatosplenomegaly, no masses and bowel sounds normal  MS: no gross musculoskeletal defects noted, no edema  SKIN: no suspicious lesions or rashes  NEURO: Normal strength and tone, mentation intact and speech normal  PSYCH: mentation appears normal, affect normal/bright    Diagnostic Test Results:  Labs reviewed in Epic    ASSESSMENT/PLAN:   Routine general medical examination at a health care facility  Routine health education discussed: calcium, diet, exercise, weight, safety.   - Comprehensive metabolic panel (BMP + Alb, Alk Phos, ALT, AST, Total. Bili, TP); Future    Screen for colon cancer  Discussed options, will do cologuard  - COLOGUARD(EX[employer] SCIENCES); Future    Essential hypertension, benign  Controlled, check labs and refill medications   - lisinopril (ZESTRIL) 40 MG tablet; Take 1 tablet (40 mg) by mouth daily  - amLODIPine (NORVASC) 5 MG tablet; Take 1 tablet (5 mg) by mouth daily  - Comprehensive metabolic panel (BMP + Alb, Alk Phos, ALT, AST, Total. Bili, TP); Future    Hyperlipidemia LDL goal <160  Recheck labs and refill medications   - Lipid panel reflex to direct LDL Non-fasting; Future  - " "atorvastatin (LIPITOR) 20 MG tablet; Take 1 tablet (20 mg) by mouth daily  - Comprehensive metabolic panel (BMP + Alb, Alk Phos, ALT, AST, Total. Bili, TP); Future    Visit for screening mammogram    - MA SCREENING DIGITAL BILAT - Future  (s+30); Future    Panic disorder without agoraphobia  Stable, refill medications   - DULoxetine (CYMBALTA) 60 MG capsule; Take 1 capsule (60 mg) by mouth daily  - busPIRone (BUSPAR) 15 MG tablet; Take 1 tablet (15 mg) by mouth 2 times daily    Other headache syndrome  Controlled, refill medication   - DULoxetine (CYMBALTA) 60 MG capsule; Take 1 capsule (60 mg) by mouth daily    Impaired fasting glucose  Reviewed the treatment options for obesity including diet and exercise regimens.  Emphasized that lifestyle change, and most particularly regular exercise, is a critical component of all treatment plans and offered nutrion referral.  Discussed reasonable weight loss goals and time-frame.   - Hemoglobin A1c; Future  - Adult Comprehensive Weight Management  Referral; Future  - Comprehensive metabolic panel (BMP + Alb, Alk Phos, ALT, AST, Total. Bili, TP); Future    Class 1 obesity due to excess calories with serious comorbidity and body mass index (BMI) of 31.0 to 31.9 in adult  Refer to discuss options  - Adult Comprehensive Weight Management  Referral; Future  - Comprehensive metabolic panel (BMP + Alb, Alk Phos, ALT, AST, Total. Bili, TP); Future            COUNSELING:  Reviewed preventive health counseling, as reflected in patient instructions      BMI:   Estimated body mass index is 31.91 kg/m  as calculated from the following:    Height as of this encounter: 1.584 m (5' 2.36\").    Weight as of this encounter: 80.1 kg (176 lb 8 oz).   Weight management plan: Patient referred to endocrine and/or weight management specialty      She reports that she has never smoked. She has never used smokeless tobacco.          Jennifer Dickerson MD  [facility]" [facility]

## 2023-09-19 NOTE — LETTER
September 20, 2023      Mariah Mart  [address] [address]        Dear Mariah,    Your cholesterol is controlled.  Your electrolytes, kidney function, liver function and blood sugar are normal.     Your blood sugar is mildly elevated, as tested by the hgbA1C but improved since last year.  This measures average sugar over the past 3 mos.  5.7-6.4 is considered borderline elevation.  This is a pre-diabetic condition.  Eating healthily, exercising and maintaining a healthy weight can prevent the development of diabetes.  You should have your blood sugar checked yearly to monitor this.      Please let us know if you have any questions or concerns.      Jennifer Dickerson MD     Resulted Orders   Lipid panel reflex to direct LDL Non-fasting   Result Value Ref Range    Cholesterol 169 <200 mg/dL    Triglycerides 177 (H) <150 mg/dL    Direct Measure HDL 50 >=50 mg/dL    LDL Cholesterol Calculated 84 <=100 mg/dL    Non HDL Cholesterol 119 <130 mg/dL    Narrative    Cholesterol  Desirable:  <200 mg/dL    Triglycerides  Normal:  Less than 150 mg/dL  Borderline High:  150-199 mg/dL  High:  200-499 mg/dL  Very High:  Greater than or equal to 500 mg/dL    Direct Measure HDL  Female:  Greater than or equal to 50 mg/dL   Male:  Greater than or equal to 40 mg/dL    LDL Cholesterol  Desirable:  <100mg/dL  Above Desirable:  100-129 mg/dL   Borderline High:  130-159 mg/dL   High:  160-189 mg/dL   Very High:  >= 190 mg/dL    Non HDL Cholesterol  Desirable:  130 mg/dL  Above Desirable:  130-159 mg/dL  Borderline High:  160-189 mg/dL  High:  190-219 mg/dL  Very High:  Greater than or equal to 220 mg/dL   Hemoglobin A1c   Result Value Ref Range    Hemoglobin A1C 5.7 (H) 0.0 - 5.6 %      Comment:      Normal <5.7%   Prediabetes 5.7-6.4%    Diabetes 6.5% or higher     Note: Adopted from ADA consensus guidelines.   Comprehensive metabolic panel (BMP + Alb, Alk Phos, ALT, AST, Total. Bili, TP)   Result Value Ref  Range    Sodium 139 136 - 145 mmol/L    Potassium 4.2 3.4 - 5.3 mmol/L    Chloride 100 98 - 107 mmol/L    Carbon Dioxide (CO2) 28 22 - 29 mmol/L    Anion Gap 11 7 - 15 mmol/L    Urea Nitrogen 7.0 (L) 8.0 - 23.0 mg/dL    Creatinine 0.68 0.51 - 0.95 mg/dL    Calcium 9.7 8.8 - 10.2 mg/dL    Glucose 78 70 - 99 mg/dL    Alkaline Phosphatase 100 35 - 104 U/L    AST 19 0 - 45 U/L      Comment:      Reference intervals for this test were updated on 6/12/2023 to more accurately reflect our healthy population. There may be differences in the flagging of prior results with similar values performed with this method. Interpretation of those prior results can be made in the context of the updated reference intervals.    ALT 19 0 - 50 U/L      Comment:      Reference intervals for this test were updated on 6/12/2023 to more accurately reflect our healthy population. There may be differences in the flagging of prior results with similar values performed with this method. Interpretation of those prior results can be made in the context of the updated reference intervals.      Protein Total 6.9 6.4 - 8.3 g/dL    Albumin 4.3 3.5 - 5.2 g/dL    Bilirubin Total 0.2 <=1.2 mg/dL    GFR Estimate >90 >60 mL/min/1.73m2

## 2023-10-27 ENCOUNTER — TRANSFERRED RECORDS (OUTPATIENT)
Dept: HEALTH INFORMATION MANAGEMENT | Facility: CLINIC | Age: 63
End: 2023-10-27
Payer: COMMERCIAL

## 2023-10-30 ENCOUNTER — ANCILLARY PROCEDURE (OUTPATIENT)
Dept: MAMMOGRAPHY | Facility: CLINIC | Age: 63
End: 2023-10-30
Attending: INTERNAL MEDICINE
Payer: COMMERCIAL

## 2023-10-30 DIAGNOSIS — Z12.31 VISIT FOR SCREENING MAMMOGRAM: ICD-10-CM

## 2023-10-30 PROCEDURE — 77067 SCR MAMMO BI INCL CAD: CPT | Mod: TC | Performed by: RADIOLOGY

## 2023-11-13 ENCOUNTER — TRANSFERRED RECORDS (OUTPATIENT)
Dept: HEALTH INFORMATION MANAGEMENT | Facility: CLINIC | Age: 63
End: 2023-11-13
Payer: COMMERCIAL

## 2023-11-27 ENCOUNTER — TRANSFERRED RECORDS (OUTPATIENT)
Dept: HEALTH INFORMATION MANAGEMENT | Facility: CLINIC | Age: 63
End: 2023-11-27
Payer: COMMERCIAL

## 2024-02-06 ENCOUNTER — PATIENT OUTREACH (OUTPATIENT)
Dept: CARE COORDINATION | Facility: CLINIC | Age: 64
End: 2024-02-06
Payer: COMMERCIAL

## 2024-02-06 NOTE — PROGRESS NOTES
Clinic Care Coordination Contact  Program:   [facility]: [place]   Renewal: [employer]  Date Applied:      AMANDA Outreach:   2/6/24:  CTA called to see if patient needed assistance with their [employer] Renewal. Patient declined needing assistance and no follow up needed   CTA will mail application to casie Nino  Care   [facility]  Clinic Care Coordination  [phone]       Health Insurance:        Referral/Screening:

## 2024-09-10 ENCOUNTER — PATIENT OUTREACH (OUTPATIENT)
Dept: GERIATRIC MEDICINE | Facility: CLINIC | Age: 64
End: 2024-09-10
Payer: COMMERCIAL

## 2024-09-10 NOTE — PROGRESS NOTES
[facility] Care Coordination Contact    Member became effective with  [facility] on 9[facility]1/2024 [facility]  Previous Health Plan: [place]/Fee For Service  Previous Care System:  n/a  Previous care coordinators name and number: n/a  Waiver Type: N/A  UTF received: No UTF to request  Mailed welcome letter and [facility] When to Contact Your Care Coordinator  Address/Phone discrepancy: none  [place] and fully prepped for visit as member [facility].    Jessica Carrillo  Case Management Specialist  [facility]  [phone]

## 2024-09-10 NOTE — LETTER
September 10, 2024    EDUARDO MIDDLETON  [address]  [place] [address]      Dear Eduardo,    Welcome to [facility] health program. My name is Mitzi Croft RN, PHN. I am your [facility] [place] care coordinator. You're eligible for care coordination through your [facility] Product.    As your care coordinator, we ll:  Meet to go over your care coordination benefits  Talk about your physical and mental health care needs   Review your preventative care needs  Create a plan that meets your needs with the services you choose    What happens next?  I ll call you soon to introduce myself and tell you more about my role. We ll then plan time to go over your health and safety needs. Our goal is to keep you as healthy and independent as possible.    [facility] combines the benefits you may already have receive from Medical Assistance, Medicare and the Prescription Drug Coverage Program. Soon you'll receive a new member identification (ID) card from [facility]. Use this card whenever you get health services.    The [facility] [place] care coordination program is voluntary and offered to you at no cost. If you wish to stop being in the care coordination program or have questions, call me at [phone]. If you reach my voicemail, leave a message and your phone number. TTY users, call the [place] Relay at 861 or [phone] (nxigsh-lu-vbpfnz relay service).    Sincerely,    Mitzi Starks RN, PHN  [phone]  [email or web address] ([facility]) is a health plan that contracts with both Medicare and the [place] Medical Assistance (Medicaid) program to provide benefits of both programs to enrollees. Enrollment in [facility] depends on contract renewal.    [id number] accepted   [id number]         [id number] (07/2022)

## 2024-09-16 ENCOUNTER — PATIENT OUTREACH (OUTPATIENT)
Dept: GERIATRIC MEDICINE | Facility: CLINIC | Age: 64
End: 2024-09-16
Payer: COMMERCIAL

## 2024-09-16 NOTE — Clinical Note
Mariah Bains Dr is new to [facility] due to turing 64.  She did not want an assessment at this time.  She had no concerns.  Thanks,  Mitzi

## 2024-09-16 NOTE — LETTER
September 17, 2024    EDUARDO MIDDLETON  [address] [address] [facility] [address]        Dear Eduardo:    As a member of [facility]'s MSC+ program, we offer a health risk assessment at no cost to you. I know you don't want to have the assessment right now. If you change your mind, please call me at the number below.    Who performs the health risk assessment?  A [facility] Care Coordinator performs the assessment. Our Care Coordinators can also help you understand your benefits. They can tell you about services to aid you at home, such as managing your care with your doctors if your health worsens.    Our Care Coordinators are here for you if you need:  Support for activities you used to do by yourself (including making meals, bathing and paying bills)  Equipment for bathroom or home safety  Help finding a new place to live  Information on staying healthy, preventing falls and immunizations    Questions?  If you have questions, or you would like to do he assessment, call me at [phone]. TTY users call [phone]. I'm here from 8am to 5pm. I may reach out to you again soon.       Sincerely,         Mitzi Starks RN, PHN  [phone]  [email or web address]      \<[id number] accepted  [id number]>    [id number] (21/2021)

## 2024-09-16 NOTE — PROGRESS NOTES
[facility] Care Coordination Contact      [facility] Care System Change (Transfer)  Writer t/c to member, introduced self as member's new CC. Confirmed with member that the welcome letter with writer's name and contact information has been received.   Member is new enrollee to [facility] effective 9/1/24 with [employer] MSC+ health plan. Member transferred from  [facility]  care system.                                                              [facility] Refusal Telephone Assessment    Member refused home visit HRA on 9/16/24.  She had no health concerns.    ER visits: Yes -  [facility]  Hospitalizations: No  Health concerns: None  Falls/Injuries: No  ADL/IADL Dependencies: None        [facility] sponsored benefits: Shared information re: Silver Sneakers/gym memberships, ASA, Calcium +D.    Follow-Up Plan: Member informed of future contact, plan to f/u with member with a 6 month telephone assessment and offer a home visit.  Contact information shared with member and family, encouraged member to call with any questions or concerns at any time.  This CC note routed to PCP, Jennifer Dickerson RN,  PHN  [facility] Care Coordinator  [phone]

## 2024-09-17 DIAGNOSIS — F41.0 PANIC DISORDER WITHOUT AGORAPHOBIA: ICD-10-CM

## 2024-09-17 DIAGNOSIS — G44.89 OTHER HEADACHE SYNDROME: ICD-10-CM

## 2024-09-17 RX ORDER — DULOXETIN HYDROCHLORIDE 60 MG/1
60 CAPSULE, DELAYED RELEASE ORAL DAILY
Qty: 90 CAPSULE | Refills: 0 | Status: SHIPPED | OUTPATIENT
Start: 2024-09-17

## 2024-09-17 NOTE — PROGRESS NOTES
"Per CC, mailed client a \"Refusal of Home Visit\" letter and uploaded Refusal POC to LEONARD Patterson.    Jessica Carrillo  Case Management Specialist  [facility]  [phone]    "

## 2024-09-18 NOTE — TELEPHONE ENCOUNTER
Called patient and scheduled her for a welcome to medicare annual wellness visit.  Tejal Miramontes, CMA

## 2024-10-01 ENCOUNTER — PATIENT OUTREACH (OUTPATIENT)
Dept: CARE COORDINATION | Facility: CLINIC | Age: 64
End: 2024-10-01
Payer: COMMERCIAL

## 2024-10-29 ENCOUNTER — PATIENT OUTREACH (OUTPATIENT)
Dept: CARE COORDINATION | Facility: CLINIC | Age: 64
End: 2024-10-29
Payer: MEDICARE

## 2024-11-01 ENCOUNTER — OFFICE VISIT (OUTPATIENT)
Dept: PEDIATRICS | Facility: CLINIC | Age: 64
End: 2024-11-01
Payer: MEDICARE

## 2024-11-01 VITALS
RESPIRATION RATE: 18 BRPM | HEIGHT: 63 IN | DIASTOLIC BLOOD PRESSURE: 77 MMHG | OXYGEN SATURATION: 98 % | HEART RATE: 90 BPM | SYSTOLIC BLOOD PRESSURE: 120 MMHG | TEMPERATURE: 98 F | BODY MASS INDEX: 27.05 KG/M2 | WEIGHT: 152.7 LBS

## 2024-11-01 DIAGNOSIS — G44.89 OTHER HEADACHE SYNDROME: ICD-10-CM

## 2024-11-01 DIAGNOSIS — R73.01 IMPAIRED FASTING GLUCOSE: ICD-10-CM

## 2024-11-01 DIAGNOSIS — Z12.11 SCREEN FOR COLON CANCER: ICD-10-CM

## 2024-11-01 DIAGNOSIS — E78.5 HYPERLIPIDEMIA LDL GOAL <160: ICD-10-CM

## 2024-11-01 DIAGNOSIS — F51.04 PSYCHOPHYSIOLOGICAL INSOMNIA: ICD-10-CM

## 2024-11-01 DIAGNOSIS — Z13.1 SCREENING FOR DIABETES MELLITUS: ICD-10-CM

## 2024-11-01 DIAGNOSIS — I10 ESSENTIAL HYPERTENSION, BENIGN: ICD-10-CM

## 2024-11-01 DIAGNOSIS — F41.0 PANIC DISORDER WITHOUT AGORAPHOBIA: ICD-10-CM

## 2024-11-01 DIAGNOSIS — H35.30 MACULAR DEGENERATION (SENILE) OF RETINA: ICD-10-CM

## 2024-11-01 DIAGNOSIS — Z23 NEED FOR SHINGLES VACCINE: ICD-10-CM

## 2024-11-01 DIAGNOSIS — Z00.00 ENCOUNTER FOR MEDICARE ANNUAL WELLNESS EXAM: Primary | ICD-10-CM

## 2024-11-01 DIAGNOSIS — Z12.31 VISIT FOR SCREENING MAMMOGRAM: ICD-10-CM

## 2024-11-01 DIAGNOSIS — Z78.0 ASYMPTOMATIC POSTMENOPAUSAL STATE: ICD-10-CM

## 2024-11-01 LAB
ANION GAP SERPL CALCULATED.3IONS-SCNC: 12 MMOL/L (ref 7–15)
BUN SERPL-MCNC: 4.6 MG/DL (ref 8–23)
CALCIUM SERPL-MCNC: 9.5 MG/DL (ref 8.8–10.4)
CHLORIDE SERPL-SCNC: 99 MMOL/L (ref 98–107)
CHOLEST SERPL-MCNC: 180 MG/DL
CREAT SERPL-MCNC: 0.68 MG/DL (ref 0.51–0.95)
EGFRCR SERPLBLD CKD-EPI 2021: >90 ML/MIN/1.73M2
EST. AVERAGE GLUCOSE BLD GHB EST-MCNC: 105 MG/DL
FASTING STATUS PATIENT QL REPORTED: ABNORMAL
FASTING STATUS PATIENT QL REPORTED: ABNORMAL
GLUCOSE SERPL-MCNC: 89 MG/DL (ref 70–99)
HBA1C MFR BLD: 5.3 % (ref 0–5.6)
HCO3 SERPL-SCNC: 25 MMOL/L (ref 22–29)
HDLC SERPL-MCNC: 54 MG/DL
LDLC SERPL CALC-MCNC: 106 MG/DL
NONHDLC SERPL-MCNC: 126 MG/DL
POTASSIUM SERPL-SCNC: 4.3 MMOL/L (ref 3.4–5.3)
SODIUM SERPL-SCNC: 136 MMOL/L (ref 135–145)
TRIGL SERPL-MCNC: 102 MG/DL

## 2024-11-01 PROCEDURE — 99214 OFFICE O/P EST MOD 30 MIN: CPT | Mod: 25 | Performed by: INTERNAL MEDICINE

## 2024-11-01 PROCEDURE — 80048 BASIC METABOLIC PNL TOTAL CA: CPT | Performed by: INTERNAL MEDICINE

## 2024-11-01 PROCEDURE — 83036 HEMOGLOBIN GLYCOSYLATED A1C: CPT | Performed by: INTERNAL MEDICINE

## 2024-11-01 PROCEDURE — 80061 LIPID PANEL: CPT | Performed by: INTERNAL MEDICINE

## 2024-11-01 PROCEDURE — 36415 COLL VENOUS BLD VENIPUNCTURE: CPT | Performed by: INTERNAL MEDICINE

## 2024-11-01 PROCEDURE — G0402 INITIAL PREVENTIVE EXAM: HCPCS | Performed by: INTERNAL MEDICINE

## 2024-11-01 RX ORDER — DULOXETIN HYDROCHLORIDE 60 MG/1
60 CAPSULE, DELAYED RELEASE ORAL DAILY
Qty: 90 CAPSULE | Refills: 4 | Status: SHIPPED | OUTPATIENT
Start: 2024-11-01

## 2024-11-01 RX ORDER — LISINOPRIL 40 MG/1
40 TABLET ORAL DAILY
Qty: 90 TABLET | Refills: 4 | Status: SHIPPED | OUTPATIENT
Start: 2024-11-01

## 2024-11-01 RX ORDER — BUSPIRONE HYDROCHLORIDE 15 MG/1
15 TABLET ORAL 2 TIMES DAILY
Qty: 180 TABLET | Refills: 4 | Status: SHIPPED | OUTPATIENT
Start: 2024-11-01

## 2024-11-01 RX ORDER — AMLODIPINE BESYLATE 5 MG/1
5 TABLET ORAL DAILY
Qty: 90 TABLET | Refills: 3 | Status: SHIPPED | OUTPATIENT
Start: 2024-11-01

## 2024-11-01 RX ORDER — ATORVASTATIN CALCIUM 20 MG/1
20 TABLET, FILM COATED ORAL DAILY
Qty: 90 TABLET | Refills: 4 | Status: SHIPPED | OUTPATIENT
Start: 2024-11-01

## 2024-11-01 RX ORDER — TRAZODONE HYDROCHLORIDE 50 MG/1
25-50 TABLET, FILM COATED ORAL AT BEDTIME
Qty: 90 TABLET | Refills: 3 | Status: SHIPPED | OUTPATIENT
Start: 2024-11-01

## 2024-11-01 SDOH — HEALTH STABILITY: PHYSICAL HEALTH: ON AVERAGE, HOW MANY DAYS PER WEEK DO YOU ENGAGE IN MODERATE TO STRENUOUS EXERCISE (LIKE A BRISK WALK)?: 1 DAY

## 2024-11-01 ASSESSMENT — SOCIAL DETERMINANTS OF HEALTH (SDOH): HOW OFTEN DO YOU GET TOGETHER WITH FRIENDS OR RELATIVES?: ONCE A WEEK

## 2024-11-01 NOTE — PATIENT INSTRUCTIONS
Patient Education     Schedule your vaccines at your pharmacy.    Try trazodone to help with sleeping - start with 1/2 tab and can increase to a whole tab.  Take it about 60 mins before you plan to go to sleep.  If it isn't helping or you have side effects, please call and we can switch or increase the dose.  Preventive Care Advice   This is general advice given by our system to help you stay healthy. However, your care team may have specific advice just for you. Please talk to your care team about your preventive care needs.  Nutrition  Eat 5 or more servings of fruits and vegetables each day.  Try wheat bread, brown rice and whole grain pasta (instead of white bread, rice, and pasta).  Get enough calcium and vitamin D. Check the label on foods and aim for 100% of the RDA (recommended daily allowance).  Lifestyle  Exercise at least 150 minutes each week  (30 minutes a day, 5 days a week).  Do muscle strengthening activities 2 days a week. These help control your weight and prevent disease.  No smoking.  Wear sunscreen to prevent skin cancer.  Have a dental exam and cleaning every 6 months.  Yearly exams  See your health care team every year to talk about:  Any changes in your health.  Any medicines your care team has prescribed.  Preventive care, family planning, and ways to prevent chronic diseases.  Shots (vaccines)   COVID-19 shot: Get this shot when it's due.  Flu shot: Get a flu shot every year.  Tetanus shot: Get a tetanus shot every 10 years.  Pneumococcal: get at your pharmacy  RSV shots: get at your pharmacy IF you are caring for a baby under 1  Shingles shot (get this at your pharmacy  General health tests  Diabetes screening:  Starting at age 35, Get screened for diabetes at least every 3 years.  If you are younger than age 35, ask your care team if you should be screened for diabetes.  Cholesterol test: At age 39, start having a cholesterol test every 5 years, or more often if advised.  Bone density  scan (DEXA): At age 50, ask your care team if you should have this scan for osteoporosis (brittle bones).  Hepatitis C: Get tested at least once in your life.  STIs (sexually transmitted infections)  Before age 24: Ask your care team if you should be screened for STIs.  After age 24: Get screened for STIs if you're at risk. You are at risk for STIs (including HIV) if:  You are sexually active with more than one person.  You don't use condoms every time.  You or a partner was diagnosed with a sexually transmitted infection.  If you are at risk for HIV, ask about PrEP medicine to prevent HIV.  Get tested for HIV at least once in your life, whether you are at risk for HIV or not.  Cancer screening tests  Breast cancer scan (mammogram): If you've ever had breasts, begin having regular mammograms starting at age 40. This is a scan to check for breast cancer.  Colon cancer screening: It is important to start screening for colon cancer at age 45.  Have a colonoscopy test every 10 years (or more often if you're at risk) Or, ask your provider about stool tests like a FIT test every year or Cologuard test every 3 years.  To learn more about your testing options, visit:   .  For help making a decision, visit:   https://bit.ly/pp75716.  Lung cancer screening: If you are a current or former smoker ages 50 to 80, ask your care team if ongoing lung cancer screenings are right for you.  For informational purposes only. Not to replace the advice of your health care provider. Copyright   2023 [facility]. All rights reserved. Clinically reviewed by the [facility]. [employer] 296283 - REV 01/24.  Learning About Stress  What is stress?     Stress is your body's response to a hard situation. Your body can have a physical, emotional, or mental response. Stress is a fact of life for most people, and it affects everyone differently. What causes stress for you may not be stressful for someone else.  A  lot of things can cause stress. You may feel stress when you go on a job interview, take a test, or run a race. This kind of short-term stress is normal and even useful. It can help you if you need to work hard or react quickly. For example, stress can help you finish an important job on time.  Long-term stress is caused by ongoing stressful situations or events. Examples of long-term stress include long-term health problems, ongoing problems at work, or conflicts in your family. Long-term stress can harm your health.  How does stress affect your health?  When you are stressed, your body responds as though you are in danger. It makes hormones that speed up your heart, make you breathe faster, and give you a burst of energy. This is called the fight-or-flight stress response. If the stress is over quickly, your body goes back to normal and no harm is done.  But if stress happens too often or lasts too long, it can have bad effects. Long-term stress can make you more likely to get sick, and it can make symptoms of some diseases worse. If you tense up when you are stressed, you may develop neck, shoulder, or low back pain. Stress is linked to high blood pressure and heart disease.  Stress also harms your emotional health. It can make you plascencia, tense, or depressed. Your relationships may suffer, and you may not do well at work or school.  What can you do to manage stress?  You can try these things to help manage stress:   Do something active. Exercise or activity can help reduce stress. Walking is a great way to get started. Even everyday activities such as housecleaning or yard work can help.  Try yoga or amirah chi. These techniques combine exercise and meditation. You may need some training at first to learn them.  Do something you enjoy. For example, listen to music or go to a movie. Practice your hobby or do volunteer work.  Meditate. This can help you relax, because you are not worrying about what happened before or  "what may happen in the future.  Do guided imagery. Imagine yourself in any setting that helps you feel calm. You can use online videos, books, or a teacher to guide you.  Do breathing exercises. For example:  From a standing position, bend forward from the waist with your knees slightly bent. Let your arms dangle close to the floor.  Breathe in slowly and deeply as you return to a standing position. Roll up slowly and lift your head last.  Hold your breath for just a few seconds in the standing position.  Breathe out slowly and bend forward from the waist.  Let your feelings out. Talk, laugh, cry, and express anger when you need to. Talking with supportive friends or family, a counselor, or a phan leader about your feelings is a healthy way to relieve stress. Avoid discussing your feelings with people who make you feel worse.  Write. It may help to write about things that are bothering you. This helps you find out how much stress you feel and what is causing it. When you know this, you can find better ways to cope.  What can you do to prevent stress?  You might try some of these things to help prevent stress:  Manage your time. This helps you find time to do the things you want and need to do.  Get enough sleep. Your body recovers from the stresses of the day while you are sleeping.  Get support. Your family, friends, and community can make a difference in how you experience stress.  Limit your news feed. Avoid or limit time on social media or news that may make you feel stressed.  Do something active. Exercise or activity can help reduce stress. Walking is a great way to get started.  Where can you learn more?  Go to https://www.[employer].net/patiented  Enter N032 in the search box to learn more about \"Learning About Stress.\"  Current as of: October 24, 2023  Content Version: 14.2 2024 [facility].   Care instructions adapted under license by your healthcare professional. If you have questions about " a medical condition or this instruction, always ask your healthcare professional. [employer] disclaims any warranty or liability for your use of this information.

## 2024-11-01 NOTE — LETTER
November 4, 2024      Mariah Mart  [address] [id number]        Dear ,    Your hemoglobin A1C, a measure of average sugar levels in the past 3 months, is normal. There is no sign of diabetes or pre-diabetes.     Your electrolytes, kidney function and blood sugar are normal.     Your cholesterol is good.  Your 10-year ASCVD risk score (risk of heart attack or stroke in the next 10 years) is 6.3% and the goal is <7.5%.     Remember to do your colon cancer screening, `bone density test and mammogram along with your vaccines.     Please let us know if you have any questions or concerns.        Jennifer Dickerson MD     Resulted Orders   BASIC METABOLIC PANEL   Result Value Ref Range    Sodium 136 135 - 145 mmol/L    Potassium 4.3 3.4 - 5.3 mmol/L    Chloride 99 98 - 107 mmol/L    Carbon Dioxide (CO2) 25 22 - 29 mmol/L    Anion Gap 12 7 - 15 mmol/L    Urea Nitrogen 4.6 (L) 8.0 - 23.0 mg/dL    Creatinine 0.68 0.51 - 0.95 mg/dL    GFR Estimate >90 >60 mL/min/1.73m2      Comment:      eGFR calculated using 2021 CKD-EPI equation.    Calcium 9.5 8.8 - 10.4 mg/dL      Comment:      Reference intervals for this test were updated on 7/16/2024 to reflect our healthy population more accurately. There may be differences in the flagging of prior results with similar values performed with this method. Those prior results can be interpreted in the context of the updated reference intervals.    Glucose 89 70 - 99 mg/dL    Patient Fasting > 8hrs? Unknown    Lipid panel reflex to direct LDL Fasting   Result Value Ref Range    Cholesterol 180 <200 mg/dL    Triglycerides 102 <150 mg/dL    Direct Measure HDL 54 >=50 mg/dL    LDL Cholesterol Calculated 106 (H) <100 mg/dL    Non HDL Cholesterol 126 <130 mg/dL    Patient Fasting > 8hrs? Unknown     Narrative    Cholesterol  Desirable: < 200 mg/dL  Borderline High: 200 - 239 mg/dL  High: >= 240 mg/dL    Triglycerides  Normal: < 150 mg/dL  Borderline  High: 150 - 199 mg/dL  High: 200-499 mg/dL  Very High: >= 500 mg/dL    Direct Measure HDL  Female: >= 50 mg/dL   Male: >= 40 mg/dL    LDL Cholesterol  Desirable: < 100 mg/dL  Above Desirable: 100 - 129 mg/dL   Borderline High: 130 - 159 mg/dL   High:  160 - 189 mg/dL   Very High: >= 190 mg/dL    Non HDL Cholesterol  Desirable: < 130 mg/dL  Above Desirable: 130 - 159 mg/dL  Borderline High: 160 - 189 mg/dL  High: 190 - 219 mg/dL  Very High: >= 220 mg/dL   Hemoglobin A1c   Result Value Ref Range    Estimated Average Glucose 105 <117 mg/dL    Hemoglobin A1C 5.3 0.0 - 5.6 %      Comment:      Normal <5.7%   Prediabetes 5.7-6.4%    Diabetes 6.5% or higher     Note: Adopted from ADA consensus guidelines.

## 2024-11-01 NOTE — PROGRESS NOTES
Preventive Care Visit  [facility] LEIGH Dickerson MD, Internal Medicine  Nov 1, 2024      Assessment & Plan     Encounter for Medicare annual wellness exam  Routine health education discussed: calcium, diet, exercise, weight, safety.     Essential hypertension, benign  Controlled.  Refill and check labs  - amLODIPine (NORVASC) 5 MG tablet; Take 1 tablet (5 mg) by mouth daily.  - lisinopril (ZESTRIL) 40 MG tablet; Take 1 tablet (40 mg) by mouth daily.  - BASIC METABOLIC PANEL; Future  - Hemoglobin A1c; Future  - BASIC METABOLIC PANEL  - Hemoglobin A1c    Hyperlipidemia LDL goal <160  Check labs and refill  - atorvastatin (LIPITOR) 20 MG tablet; Take 1 tablet (20 mg) by mouth daily.  - Lipid panel reflex to direct LDL Fasting; Future  - Hemoglobin A1c; Future  - Lipid panel reflex to direct LDL Fasting  - Hemoglobin A1c    Impaired fasting glucose  Discussed glucose elevation.  Discuss this is a pre-diabetic condition.  Recommended eating healthily, exercising and maintaining a healthy weight to prevent the development of diabetes.  Recommended blood sugar checks at least yearly to monitor this.  Recommended dietary consultation which the patient declined.   - Hemoglobin A1c; Future  - Hemoglobin A1c    Panic disorder without agoraphobia  Discussed stress and coping.  Continue with therapy and medications   - busPIRone (BUSPAR) 15 MG tablet; Take 1 tablet (15 mg) by mouth 2 times daily.  - DULoxetine (CYMBALTA) 60 MG capsule; Take 1 capsule (60 mg) by mouth daily.    Psychophysiological insomnia  Discussed options.  Will start trazodone per orders and notify if side effects or uncontrolled  - traZODone (DESYREL) 50 MG tablet; Take 0.5-1 tablets (25-50 mg) by mouth at bedtime.    Other headache syndrome  refill  - DULoxetine (CYMBALTA) 60 MG capsule; Take 1 capsule (60 mg) by mouth daily.    Macular degeneration (senile) of retina  Continue injections and eye follow-up     Visit for screening  "mammogram    - MA Screening Bilateral w/ Freddie; Future    Screen for colon cancer  Will do cologuard    Need for shingles vaccine    - zoster vaccine recombinant adjuvanted (SHINGRIX) injection; Inject 0.5 mLs into the muscle once for 1 dose. Pharmacist administered    Screening for diabetes mellitus    - Hemoglobin A1c; Future  - Hemoglobin A1c    Asymptomatic postmenopausal state    - DEXA HIP/PELVIS/SPINE - Future; Future            BMI  Estimated body mass index is 27.05 kg/m  as calculated from the following:    Height as of this encounter: 1.6 m (5' 3\").    Weight as of this encounter: 69.3 kg (152 lb 11.2 oz).       Counseling  Appropriate preventive services were addressed with this patient via screening, questionnaire, or discussion as appropriate for fall prevention, nutrition, physical activity, Tobacco-use cessation, social engagement, weight loss and cognition.  Checklist reviewing preventive services available has been given to the patient.  Reviewed patient's diet, addressing concerns and/or questions.   She is at risk for lack of exercise and has been provided with information to increase physical activity for the benefit of her well-being.   Patient reported safety concerns were addressed today.    See Patient Instructions    Subjective   Mariah is a 64 year old, presenting for the following:  Medicare Visit        11/1/2024     9:11 AM   Additional Questions   Roomed by Briana Alexander MA          HPI  Has a cologuard kit and will do it.    Mood - high stress although kids' health is good.  Son got  and has 50/50 custody - they are helping.  Would like something to sleep better.  Has trouble falling asleep and staying asleep - mind races.    Health Care Directive  Patient does not have a Health Care Directive: Discussed advance care planning with patient; information given to patient to review.      11/1/2024   General Health   How would you rate your overall physical health? Excellent   Feel " stress (tense, anxious, or unable to sleep) Very much      (!) STRESS CONCERN      11/1/2024   Nutrition   Diet: Carbohydrate counting            11/1/2024   Exercise   Days per week of moderate/strenous exercise 1 day      (!) EXERCISE CONCERN      11/1/2024   Social Factors   Frequency of gathering with friends or relatives Once a week   Worry food won't last until get money to buy more No   Food not last or not have enough money for food? No   Do you have housing? (Housing is defined as stable permanent housing and does not include staying ouside in a car, in a tent, in an abandoned building, in an overnight shelter, or couch-surfing.) Yes   Are you worried about losing your housing? No   Lack of transportation? No   Unable to get utilities (heat,electricity)? Yes   Want help with housing or utility concern? No      (!) FINANCIAL RESOURCE STRAIN CONCERN      11/1/2024   Fall Risk   Fallen 2 or more times in the past year? No     No    Trouble with walking or balance? No     No        Patient-reported    Multiple values from one day are sorted in reverse-chronological order          11/1/2024   Activities of Daily Living- Home Safety   Needs help with the following daily activites None of the above   Safety concerns in the home Poor lighting            11/1/2024   Dental   Dentist two times every year? (!) DECLINE            11/1/2024   Hearing Screening   Hearing concerns? None of the above            11/1/2024   Driving Risk Screening   Patient/family members have concerns about driving No            11/1/2024   General Alertness/Fatigue Screening   Have you been more tired than usual lately? No            11/1/2024   Urinary Incontinence Screening   Bothered by leaking urine in past 6 months No            11/1/2024   TB Screening   Were you born outside of the [place]? No            Today's PHQ-2 Score:       11/1/2024     9:03 AM   PHQ-2 ( 1999 [employer])   Q1: Little interest or pleasure in doing things 0    Q2:  Feeling down, depressed or hopeless 1    PHQ-2 Score 1    Q1: Little interest or pleasure in doing things Not at all   Q2: Feeling down, depressed or hopeless Several days   PHQ-2 Score 1       Patient-reported           11/1/2024   Substance Use   Alcohol more than 3/day or more than 7/wk No   Do you have a current opioid prescription? No   How severe/bad is pain from 1 to 10? 0/10 (No Pain)   Do you use any other substances recreationally? No        Social History     Tobacco Use    Smoking status: Never    Smokeless tobacco: Never   Vaping Use    Vaping status: Never Used   Substance Use Topics    Alcohol use: Yes     Comment: 1 mixed drink per month    Drug use: No           10/30/2023   LAST FHS-7 RESULTS   1st degree relative breast or ovarian cancer No   Any relative bilateral breast cancer No   Any male have breast cancer No   Any ONE woman have BOTH breast AND ovarian cancer No   Any woman with breast cancer before 50yrs No   2 or more relatives with breast AND/OR ovarian cancer No   2 or more relatives with breast AND/OR bowel cancer No           Mammogram Screening - Mammogram every 1-2 years updated in Health Maintenance based on mutual decision making      History of abnormal Pap smear: No - age 65 or older with adequate negative prior screening test results (3 consecutive negative cytology results, 2 consecutive negative cotesting results, or 2 consecutive negative HrHPV test results within 10 years, with the most recent test occurring within the recommended screening interval for the test used)        Latest Ref Rng & Units 6/18/2021     9:50 AM 6/18/2021     9:37 AM 9/11/2015    10:00 AM   PAP / HPV   PAP (Historical)   NIL     HPV 16 DNA NEG^Negative Negative   Negative    HPV 18 DNA NEG^Negative Negative   Negative    Other HR HPV NEG^Negative Negative   Negative      ASCVD Risk   The 10-year ASCVD risk score (Bita AMADOR, et al., 2019) is: 6.3%    Values used to calculate the score:       "Age: 64 years      Sex: Female      Is Non- : No      Diabetic: No      Tobacco smoker: No      Systolic Blood Pressure: 120 mmHg      Is BP treated: Yes      HDL Cholesterol: 50 mg/dL      Total Cholesterol: 169 mg/dL            Reviewed and updated as needed this visit by Provider   Tobacco  Allergies  Meds  Problems  Med Hx  Surg Hx  Fam Hx     Sexual Activity          Labs reviewed in EPIC  Current providers sharing in care for this patient include:  Patient Care Team:  Jennifer Dickerson MD as PCP - General  Jennifer Dickerson MD as Assigned PCP  Mitzi Croft RN as Lead Care Coordinator (Primary Care - CC)    The following health maintenance items are reviewed in Epic and correct as of today:  Health Maintenance   Topic Date Due    DEXA  Never done    ZOSTER IMMUNIZATION (2 of 2) 08/13/2021    COLORECTAL CANCER SCREENING  03/13/2022    INFLUENZA VACCINE (1) 09/01/2024    BMP  09/19/2024    LIPID  09/19/2024    Pneumococcal Vaccine: 65+ Years (1 of 1 - PCV) 08/06/2024    MAMMO SCREENING  10/30/2024    COVID-19 Vaccine (3 - 2024-25 season) 11/01/2025 (Originally 9/1/2024)    MEDICARE ANNUAL WELLNESS VISIT  11/01/2025    ANNUAL REVIEW OF HM ORDERS  11/01/2025    FALL RISK ASSESSMENT  11/01/2025    ADVANCE CARE PLANNING  06/21/2026    GLUCOSE  09/19/2026    DTAP/TDAP/TD IMMUNIZATION (5 - Td or Tdap) 11/29/2026    RSV VACCINE (1 - 1-dose 75+ series) 08/06/2034    HEPATITIS C SCREENING  Completed    HIV SCREENING  Completed    PHQ-2 (once per calendar year)  Completed    HPV IMMUNIZATION  Aged Out    MENINGITIS IMMUNIZATION  Aged Out    RSV MONOCLONAL ANTIBODY  Aged Out    URINE DRUG SCREEN  Discontinued    PAP  Discontinued            Objective    Exam  /77 (BP Location: Right arm, Patient Position: Sitting, Cuff Size: Adult Regular)   Pulse 90   Temp 98  F (36.7  C) (Oral)   Resp 18   Ht 1.6 m (5' 3\")   Wt 69.3 kg (152 lb 11.2 oz)   LMP 05/30/2007   SpO2 98%   BMI 27.05 " "kg/m     Estimated body mass index is 27.05 kg/m  as calculated from the following:    Height as of this encounter: 1.6 m (5' 3\").    Weight as of this encounter: 69.3 kg (152 lb 11.2 oz).    Physical Exam  GENERAL: alert and no distress  EYES: Eyes grossly normal to inspection, PERRL and conjunctivae and sclerae normal  HENT: ear canals and TM's normal, nose and mouth without ulcers or lesions  NECK: no adenopathy, no asymmetry, masses, or scars  RESP: lungs clear to auscultation - no rales, rhonchi or wheezes  CV: regular rate and rhythm, normal S1 S2, no S3 or S4, no murmur, click or rub, no peripheral edema  ABDOMEN: soft, nontender, no hepatosplenomegaly, no masses and bowel sounds normal  MS: no gross musculoskeletal defects noted, no edema  SKIN: no suspicious lesions or rashes  NEURO: Normal strength and tone, mentation intact and speech normal  PSYCH: mentation appears normal, affect normal/bright         11/1/2024   Mini Cog   Clock Draw Score 2 Normal   3 Item Recall 2 objects recalled   Mini Cog Total Score 4            Vision Screen  Patient wears corrective lenses (select all that apply): Worn during vision screen  Vision Screen Results: (!) REFER - has known vision loss and sees ophtho regularly      Signed Electronically by: Jennifer Dickerson MD    "

## 2024-11-04 ENCOUNTER — PATIENT OUTREACH (OUTPATIENT)
Dept: CARE COORDINATION | Facility: CLINIC | Age: 64
End: 2024-11-04
Payer: MEDICARE

## 2025-04-07 ENCOUNTER — PATIENT OUTREACH (OUTPATIENT)
Dept: GERIATRIC MEDICINE | Facility: CLINIC | Age: 65
End: 2025-04-07
Payer: MEDICARE
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
## 2025-05-15 ENCOUNTER — TRANSFERRED RECORDS (OUTPATIENT)
Dept: HEALTH INFORMATION MANAGEMENT | Facility: CLINIC | Age: 65
End: 2025-05-15
Payer: MEDICARE

## 2025-05-16 ENCOUNTER — TRANSFERRED RECORDS (OUTPATIENT)
Dept: HEALTH INFORMATION MANAGEMENT | Facility: CLINIC | Age: 65
End: 2025-05-16
Payer: MEDICARE

## 2025-07-21 ENCOUNTER — TRANSFERRED RECORDS (OUTPATIENT)
Dept: HEALTH INFORMATION MANAGEMENT | Facility: CLINIC | Age: 65
End: 2025-07-21
Payer: MEDICARE

## 2025-08-14 ENCOUNTER — PATIENT OUTREACH (OUTPATIENT)
Dept: GERIATRIC MEDICINE | Facility: CLINIC | Age: 65
End: 2025-08-14
Payer: MEDICARE

## (undated) DEVICE — SU VICRYL 0 CT-2 CR 8X18" J727D

## (undated) DEVICE — LINEN FULL SHEET 5511

## (undated) DEVICE — BLADE CLIPPER 3M 9670

## (undated) DEVICE — CATH TRAY FOLEY SURESTEP 16FR DRAIN BAG STATOCK A899916

## (undated) DEVICE — PREP CHLORAPREP 26ML TINTED ORANGE  260815

## (undated) DEVICE — DRAPE STERI TOWEL LG 1010

## (undated) DEVICE — BLADE SAW SAGITTAL STRK 25X90X1.27MM HD SYS 6 6125-127-090

## (undated) DEVICE — ESU CORD MONOPOLAR 10'  E0510

## (undated) DEVICE — STPL ENDO GIA 12MM UNIV 030449

## (undated) DEVICE — STPL ENDO RELOAD 45MM VASCULAR MEDIUM TAN EGIA45AVM

## (undated) DEVICE — GLOVE PROTEXIS BLUE W/NEU-THERA 7.0  2D73EB70

## (undated) DEVICE — SU VICRYL 4-0 PS-2 18" UND J496H

## (undated) DEVICE — PACK TOTAL HIP RIDGES LATEX PO15HIFSG

## (undated) DEVICE — SUTURE MONOCRYL+ 2-0 CT-1 36" UNDYED MCP945H

## (undated) DEVICE — DRAPE LEGGINGS 8421

## (undated) DEVICE — GLOVE PROTEXIS POWDER FREE 7.5 ORTHOPEDIC 2D73ET75

## (undated) DEVICE — LINEN HALF SHEET 5512

## (undated) DEVICE — SOL NACL 0.9% IRRIG 1000ML BOTTLE 2F7124

## (undated) DEVICE — SET HANDPIECE INTERPULSE W/COAXIAL FAN SPRAY TIP 0210118000

## (undated) DEVICE — DRSG AQUACEL AG 3.5X12" HYDROFIBER 420670

## (undated) DEVICE — BAG CLEAR TRASH 1.3M 39X33" P4040C

## (undated) DEVICE — GLOVE PROTEXIS POWDER FREE SMT 8.0  2D72PT80X

## (undated) DEVICE — LINEN DRAPE 54X72" 5467

## (undated) DEVICE — DRSG GAUZE 4X4" 8044

## (undated) DEVICE — SPONGE RAY-TEC 4X8" 7318

## (undated) DEVICE — ESU GROUND PAD ADULT W/CORD E7507

## (undated) DEVICE — LINEN ORTHO ACL PACK 5447

## (undated) DEVICE — DRSG AQUACEL AG 3.5X9.75" HYDROFIBER 412011

## (undated) DEVICE — Device

## (undated) DEVICE — HOOD FLYTE W/PEELAWAY 408-800-100

## (undated) DEVICE — STPL ENDO RELOAD GIA 45X2MM ROTIC 030453

## (undated) DEVICE — SU ETHIBOND 5 V-37 4X30" MB66G

## (undated) DEVICE — CLEANSER JET LAVAGE IRRISEPT 0.05% CHG IRRISEPT45USA

## (undated) DEVICE — ENDO TROCAR 05MM VERSAONE BLADELESS W/STD FIX CAN NONB5STF

## (undated) DEVICE — SUCTION MANIFOLD NEPTUNE 2 SYS 4 PORT 0702-020-000

## (undated) DEVICE — LINEN POUCH DBL 5427

## (undated) DEVICE — ENDO TROCAR BLUNT 100MM W/THRD ANCHOR BLUNTPORT BPT12STS

## (undated) DEVICE — LINEN TOWEL PACK X10 5473

## (undated) DEVICE — GLOVE PROTEXIS POWDER FREE 7.0 ORTHOPEDIC 2D73ET70

## (undated) DEVICE — ESU PENCIL W/HOLSTER E2350H

## (undated) DEVICE — SOL WATER IRRIG 1000ML BOTTLE 2F7114

## (undated) DEVICE — SOL NACL 0.9% IRRIG 3000ML BAG 07972-08

## (undated) DEVICE — GLOVE PROTEXIS POWDER FREE SMT 6.5  2D72PT65X

## (undated) DEVICE — ESU ELEC BLADE 2.75" COATED/INSULATED E1455

## (undated) DEVICE — GLOVE PROTEXIS BLUE W/NEU-THERA 8.0  2D73EB80

## (undated) DEVICE — SU VICRYL+ 1 MO-4 18" DYED VCP702D

## (undated) DEVICE — SU STRATAFIX PDS PLUS 1 CT-1 12" SXPP1A443

## (undated) RX ORDER — PROPOFOL 10 MG/ML
INJECTION, EMULSION INTRAVENOUS
Status: DISPENSED
Start: 2020-06-01

## (undated) RX ORDER — LIDOCAINE HYDROCHLORIDE 10 MG/ML
INJECTION, SOLUTION EPIDURAL; INFILTRATION; INTRACAUDAL; PERINEURAL
Status: DISPENSED
Start: 2020-06-01

## (undated) RX ORDER — BUPIVACAINE HYDROCHLORIDE 2.5 MG/ML
INJECTION, SOLUTION EPIDURAL; INFILTRATION; INTRACAUDAL
Status: DISPENSED
Start: 2018-01-10

## (undated) RX ORDER — LIDOCAINE HYDROCHLORIDE 10 MG/ML
INJECTION, SOLUTION EPIDURAL; INFILTRATION; INTRACAUDAL; PERINEURAL
Status: DISPENSED
Start: 2018-01-10

## (undated) RX ORDER — GLYCOPYRROLATE 0.2 MG/ML
INJECTION INTRAMUSCULAR; INTRAVENOUS
Status: DISPENSED
Start: 2018-01-10

## (undated) RX ORDER — FENTANYL CITRATE 50 UG/ML
INJECTION, SOLUTION INTRAMUSCULAR; INTRAVENOUS
Status: DISPENSED
Start: 2020-06-01

## (undated) RX ORDER — GLYCOPYRROLATE 0.2 MG/ML
INJECTION INTRAMUSCULAR; INTRAVENOUS
Status: DISPENSED
Start: 2020-06-01

## (undated) RX ORDER — HYDROMORPHONE HYDROCHLORIDE 1 MG/ML
INJECTION, SOLUTION INTRAMUSCULAR; INTRAVENOUS; SUBCUTANEOUS
Status: DISPENSED
Start: 2020-06-01

## (undated) RX ORDER — DEXAMETHASONE SODIUM PHOSPHATE 10 MG/ML
INJECTION, SOLUTION INTRAMUSCULAR; INTRAVENOUS
Status: DISPENSED
Start: 2020-05-01

## (undated) RX ORDER — NEOSTIGMINE METHYLSULFATE 1 MG/ML
VIAL (ML) INJECTION
Status: DISPENSED
Start: 2018-01-10

## (undated) RX ORDER — DEXAMETHASONE SODIUM PHOSPHATE 4 MG/ML
INJECTION, SOLUTION INTRA-ARTICULAR; INTRALESIONAL; INTRAMUSCULAR; INTRAVENOUS; SOFT TISSUE
Status: DISPENSED
Start: 2020-06-01

## (undated) RX ORDER — TRANEXAMIC ACID 10 MG/ML
INJECTION, SOLUTION INTRAVENOUS
Status: DISPENSED
Start: 2020-06-01

## (undated) RX ORDER — PROPOFOL 10 MG/ML
INJECTION, EMULSION INTRAVENOUS
Status: DISPENSED
Start: 2018-01-10

## (undated) RX ORDER — DEXAMETHASONE SODIUM PHOSPHATE 4 MG/ML
INJECTION, SOLUTION INTRA-ARTICULAR; INTRALESIONAL; INTRAMUSCULAR; INTRAVENOUS; SOFT TISSUE
Status: DISPENSED
Start: 2018-01-10

## (undated) RX ORDER — CEFAZOLIN SODIUM 2 G/100ML
INJECTION, SOLUTION INTRAVENOUS
Status: DISPENSED
Start: 2020-06-01

## (undated) RX ORDER — PANTOPRAZOLE SODIUM 40 MG/1
TABLET, DELAYED RELEASE ORAL
Status: DISPENSED
Start: 2020-06-01

## (undated) RX ORDER — LIDOCAINE HYDROCHLORIDE 10 MG/ML
INJECTION, SOLUTION EPIDURAL; INFILTRATION; INTRACAUDAL; PERINEURAL
Status: DISPENSED
Start: 2020-05-01

## (undated) RX ORDER — KETOROLAC TROMETHAMINE 30 MG/ML
INJECTION, SOLUTION INTRAMUSCULAR; INTRAVENOUS
Status: DISPENSED
Start: 2020-06-01

## (undated) RX ORDER — CELECOXIB 200 MG/1
CAPSULE ORAL
Status: DISPENSED
Start: 2020-06-01

## (undated) RX ORDER — ONDANSETRON 2 MG/ML
INJECTION INTRAMUSCULAR; INTRAVENOUS
Status: DISPENSED
Start: 2020-06-01

## (undated) RX ORDER — ONDANSETRON 2 MG/ML
INJECTION INTRAMUSCULAR; INTRAVENOUS
Status: DISPENSED
Start: 2018-01-10

## (undated) RX ORDER — PHENYLEPHRINE HCL IN 0.9% NACL 1 MG/10 ML
SYRINGE (ML) INTRAVENOUS
Status: DISPENSED
Start: 2018-01-10

## (undated) RX ORDER — CEFAZOLIN SODIUM 1 G/3ML
INJECTION, POWDER, FOR SOLUTION INTRAMUSCULAR; INTRAVENOUS
Status: DISPENSED
Start: 2020-06-01

## (undated) RX ORDER — FENTANYL CITRATE 50 UG/ML
INJECTION, SOLUTION INTRAMUSCULAR; INTRAVENOUS
Status: DISPENSED
Start: 2018-01-10